# Patient Record
Sex: FEMALE | Race: BLACK OR AFRICAN AMERICAN | NOT HISPANIC OR LATINO
[De-identification: names, ages, dates, MRNs, and addresses within clinical notes are randomized per-mention and may not be internally consistent; named-entity substitution may affect disease eponyms.]

---

## 2019-12-10 ENCOUNTER — TRANSCRIPTION ENCOUNTER (OUTPATIENT)
Age: 84
End: 2019-12-10

## 2019-12-11 ENCOUNTER — INPATIENT (INPATIENT)
Facility: HOSPITAL | Age: 84
LOS: 4 days | Discharge: NURSING, REHAB OR LONG-TERM CARE | End: 2019-12-16
Attending: ORTHOPAEDIC SURGERY | Admitting: ORTHOPAEDIC SURGERY
Payer: MEDICARE

## 2019-12-11 VITALS
TEMPERATURE: 98 F | OXYGEN SATURATION: 100 % | RESPIRATION RATE: 20 BRPM | SYSTOLIC BLOOD PRESSURE: 153 MMHG | DIASTOLIC BLOOD PRESSURE: 48 MMHG | HEART RATE: 58 BPM

## 2019-12-11 DIAGNOSIS — I10 ESSENTIAL (PRIMARY) HYPERTENSION: ICD-10-CM

## 2019-12-11 DIAGNOSIS — S72.145A NONDISPLACED INTERTROCHANTERIC FRACTURE OF LEFT FEMUR, INITIAL ENCOUNTER FOR CLOSED FRACTURE: ICD-10-CM

## 2019-12-11 DIAGNOSIS — Z01.818 ENCOUNTER FOR OTHER PREPROCEDURAL EXAMINATION: ICD-10-CM

## 2019-12-11 LAB
ALBUMIN SERPL ELPH-MCNC: 3.9 G/DL — SIGNIFICANT CHANGE UP (ref 3.3–5)
ALP SERPL-CCNC: 62 U/L — SIGNIFICANT CHANGE UP (ref 40–120)
ALT FLD-CCNC: 5 U/L — SIGNIFICANT CHANGE UP (ref 4–33)
ANION GAP SERPL CALC-SCNC: 14 MMO/L — SIGNIFICANT CHANGE UP (ref 7–14)
APPEARANCE UR: CLEAR — SIGNIFICANT CHANGE UP
APTT BLD: 25.5 SEC — LOW (ref 27.5–36.3)
AST SERPL-CCNC: 24 U/L — SIGNIFICANT CHANGE UP (ref 4–32)
BACTERIA # UR AUTO: SIGNIFICANT CHANGE UP
BASOPHILS # BLD AUTO: 0.01 K/UL — SIGNIFICANT CHANGE UP (ref 0–0.2)
BASOPHILS NFR BLD AUTO: 0.1 % — SIGNIFICANT CHANGE UP (ref 0–2)
BILIRUB SERPL-MCNC: 0.3 MG/DL — SIGNIFICANT CHANGE UP (ref 0.2–1.2)
BILIRUB UR-MCNC: NEGATIVE — SIGNIFICANT CHANGE UP
BLD GP AB SCN SERPL QL: NEGATIVE — SIGNIFICANT CHANGE UP
BLOOD UR QL VISUAL: SIGNIFICANT CHANGE UP
BUN SERPL-MCNC: 24 MG/DL — HIGH (ref 7–23)
CALCIUM SERPL-MCNC: 9.8 MG/DL — SIGNIFICANT CHANGE UP (ref 8.4–10.5)
CHLORIDE SERPL-SCNC: 104 MMOL/L — SIGNIFICANT CHANGE UP (ref 98–107)
CO2 SERPL-SCNC: 18 MMOL/L — LOW (ref 22–31)
COLOR SPEC: SIGNIFICANT CHANGE UP
CREAT SERPL-MCNC: 0.87 MG/DL — SIGNIFICANT CHANGE UP (ref 0.5–1.3)
EOSINOPHIL # BLD AUTO: 0.07 K/UL — SIGNIFICANT CHANGE UP (ref 0–0.5)
EOSINOPHIL NFR BLD AUTO: 0.8 % — SIGNIFICANT CHANGE UP (ref 0–6)
GLUCOSE SERPL-MCNC: 109 MG/DL — HIGH (ref 70–99)
GLUCOSE UR-MCNC: NEGATIVE — SIGNIFICANT CHANGE UP
HCT VFR BLD CALC: 39.2 % — SIGNIFICANT CHANGE UP (ref 34.5–45)
HGB BLD-MCNC: 12.6 G/DL — SIGNIFICANT CHANGE UP (ref 11.5–15.5)
HYALINE CASTS # UR AUTO: NEGATIVE — SIGNIFICANT CHANGE UP
IMM GRANULOCYTES NFR BLD AUTO: 0.5 % — SIGNIFICANT CHANGE UP (ref 0–1.5)
INR BLD: 1.09 — SIGNIFICANT CHANGE UP (ref 0.88–1.17)
KETONES UR-MCNC: SIGNIFICANT CHANGE UP
LEUKOCYTE ESTERASE UR-ACNC: SIGNIFICANT CHANGE UP
LYMPHOCYTES # BLD AUTO: 0.64 K/UL — LOW (ref 1–3.3)
LYMPHOCYTES # BLD AUTO: 7.3 % — LOW (ref 13–44)
MCHC RBC-ENTMCNC: 29.8 PG — SIGNIFICANT CHANGE UP (ref 27–34)
MCHC RBC-ENTMCNC: 32.1 % — SIGNIFICANT CHANGE UP (ref 32–36)
MCV RBC AUTO: 92.7 FL — SIGNIFICANT CHANGE UP (ref 80–100)
MONOCYTES # BLD AUTO: 0.31 K/UL — SIGNIFICANT CHANGE UP (ref 0–0.9)
MONOCYTES NFR BLD AUTO: 3.5 % — SIGNIFICANT CHANGE UP (ref 2–14)
NEUTROPHILS # BLD AUTO: 7.74 K/UL — HIGH (ref 1.8–7.4)
NEUTROPHILS NFR BLD AUTO: 87.8 % — HIGH (ref 43–77)
NITRITE UR-MCNC: NEGATIVE — SIGNIFICANT CHANGE UP
NRBC # FLD: 0 K/UL — SIGNIFICANT CHANGE UP (ref 0–0)
PH UR: 6 — SIGNIFICANT CHANGE UP (ref 5–8)
PLATELET # BLD AUTO: 342 K/UL — SIGNIFICANT CHANGE UP (ref 150–400)
PMV BLD: 10.1 FL — SIGNIFICANT CHANGE UP (ref 7–13)
POTASSIUM SERPL-MCNC: 4.6 MMOL/L — SIGNIFICANT CHANGE UP (ref 3.5–5.3)
POTASSIUM SERPL-SCNC: 4.6 MMOL/L — SIGNIFICANT CHANGE UP (ref 3.5–5.3)
PROT SERPL-MCNC: 8.5 G/DL — HIGH (ref 6–8.3)
PROT UR-MCNC: 20 — SIGNIFICANT CHANGE UP
PROTHROM AB SERPL-ACNC: 12.1 SEC — SIGNIFICANT CHANGE UP (ref 9.8–13.1)
RBC # BLD: 4.23 M/UL — SIGNIFICANT CHANGE UP (ref 3.8–5.2)
RBC # FLD: 13 % — SIGNIFICANT CHANGE UP (ref 10.3–14.5)
RBC CASTS # UR COMP ASSIST: HIGH (ref 0–?)
RH IG SCN BLD-IMP: POSITIVE — SIGNIFICANT CHANGE UP
SODIUM SERPL-SCNC: 136 MMOL/L — SIGNIFICANT CHANGE UP (ref 135–145)
SP GR SPEC: 1.02 — SIGNIFICANT CHANGE UP (ref 1–1.04)
SQUAMOUS # UR AUTO: SIGNIFICANT CHANGE UP
UROBILINOGEN FLD QL: NORMAL — SIGNIFICANT CHANGE UP
WBC # BLD: 8.81 K/UL — SIGNIFICANT CHANGE UP (ref 3.8–10.5)
WBC # FLD AUTO: 8.81 K/UL — SIGNIFICANT CHANGE UP (ref 3.8–10.5)
WBC UR QL: SIGNIFICANT CHANGE UP (ref 0–?)

## 2019-12-11 PROCEDURE — 99223 1ST HOSP IP/OBS HIGH 75: CPT

## 2019-12-11 PROCEDURE — 73552 X-RAY EXAM OF FEMUR 2/>: CPT | Mod: 26,LT

## 2019-12-11 PROCEDURE — 73502 X-RAY EXAM HIP UNI 2-3 VIEWS: CPT | Mod: 26,LT

## 2019-12-11 PROCEDURE — 71045 X-RAY EXAM CHEST 1 VIEW: CPT | Mod: 26

## 2019-12-11 PROCEDURE — 27245 TREAT THIGH FRACTURE: CPT | Mod: LT

## 2019-12-11 RX ORDER — HEPARIN SODIUM 5000 [USP'U]/ML
5000 INJECTION INTRAVENOUS; SUBCUTANEOUS ONCE
Refills: 0 | Status: DISCONTINUED | OUTPATIENT
Start: 2019-12-11 | End: 2019-12-11

## 2019-12-11 RX ORDER — OXYCODONE HYDROCHLORIDE 5 MG/1
2.5 TABLET ORAL EVERY 4 HOURS
Refills: 0 | Status: DISCONTINUED | OUTPATIENT
Start: 2019-12-11 | End: 2019-12-12

## 2019-12-11 RX ORDER — MAGNESIUM HYDROXIDE 400 MG/1
30 TABLET, CHEWABLE ORAL DAILY
Refills: 0 | Status: DISCONTINUED | OUTPATIENT
Start: 2019-12-11 | End: 2019-12-16

## 2019-12-11 RX ORDER — AMLODIPINE BESYLATE 2.5 MG/1
10 TABLET ORAL DAILY
Refills: 0 | Status: DISCONTINUED | OUTPATIENT
Start: 2019-12-11 | End: 2019-12-13

## 2019-12-11 RX ORDER — LANOLIN ALCOHOL/MO/W.PET/CERES
3 CREAM (GRAM) TOPICAL AT BEDTIME
Refills: 0 | Status: DISCONTINUED | OUTPATIENT
Start: 2019-12-11 | End: 2019-12-16

## 2019-12-11 RX ORDER — SENNA PLUS 8.6 MG/1
2 TABLET ORAL AT BEDTIME
Refills: 0 | Status: DISCONTINUED | OUTPATIENT
Start: 2019-12-11 | End: 2019-12-13

## 2019-12-11 RX ORDER — INFLUENZA VIRUS VACCINE 15; 15; 15; 15 UG/.5ML; UG/.5ML; UG/.5ML; UG/.5ML
0.5 SUSPENSION INTRAMUSCULAR ONCE
Refills: 0 | Status: DISCONTINUED | OUTPATIENT
Start: 2019-12-11 | End: 2019-12-16

## 2019-12-11 RX ORDER — BRIMONIDINE TARTRATE, TIMOLOL MALEATE 2; 5 MG/ML; MG/ML
1 SOLUTION/ DROPS OPHTHALMIC
Refills: 0 | Status: DISCONTINUED | OUTPATIENT
Start: 2019-12-11 | End: 2019-12-16

## 2019-12-11 RX ORDER — PANTOPRAZOLE SODIUM 20 MG/1
40 TABLET, DELAYED RELEASE ORAL
Refills: 0 | Status: DISCONTINUED | OUTPATIENT
Start: 2019-12-11 | End: 2019-12-16

## 2019-12-11 RX ORDER — CHLORHEXIDINE GLUCONATE 213 G/1000ML
1 SOLUTION TOPICAL EVERY 12 HOURS
Refills: 0 | Status: DISCONTINUED | OUTPATIENT
Start: 2019-12-11 | End: 2019-12-11

## 2019-12-11 RX ORDER — LISINOPRIL 2.5 MG/1
40 TABLET ORAL DAILY
Refills: 0 | Status: DISCONTINUED | OUTPATIENT
Start: 2019-12-11 | End: 2019-12-13

## 2019-12-11 RX ORDER — SODIUM CHLORIDE 9 MG/ML
1000 INJECTION INTRAMUSCULAR; INTRAVENOUS; SUBCUTANEOUS
Refills: 0 | Status: DISCONTINUED | OUTPATIENT
Start: 2019-12-11 | End: 2019-12-12

## 2019-12-11 RX ORDER — INFLUENZA VIRUS VACCINE 15; 15; 15; 15 UG/.5ML; UG/.5ML; UG/.5ML; UG/.5ML
0.5 SUSPENSION INTRAMUSCULAR ONCE
Refills: 0 | Status: DISCONTINUED | OUTPATIENT
Start: 2019-12-11 | End: 2019-12-12

## 2019-12-11 RX ORDER — ACETAMINOPHEN 500 MG
975 TABLET ORAL ONCE
Refills: 0 | Status: COMPLETED | OUTPATIENT
Start: 2019-12-11 | End: 2019-12-11

## 2019-12-11 RX ORDER — HEPARIN SODIUM 5000 [USP'U]/ML
5000 INJECTION INTRAVENOUS; SUBCUTANEOUS ONCE
Refills: 0 | Status: COMPLETED | OUTPATIENT
Start: 2019-12-11 | End: 2019-12-11

## 2019-12-11 RX ORDER — POVIDONE-IODINE 5 %
1 AEROSOL (ML) TOPICAL ONCE
Refills: 0 | Status: COMPLETED | OUTPATIENT
Start: 2019-12-11 | End: 2019-12-12

## 2019-12-11 RX ORDER — CHLORHEXIDINE GLUCONATE 213 G/1000ML
1 SOLUTION TOPICAL ONCE
Refills: 0 | Status: COMPLETED | OUTPATIENT
Start: 2019-12-11 | End: 2019-12-12

## 2019-12-11 RX ORDER — OXYCODONE HYDROCHLORIDE 5 MG/1
5 TABLET ORAL EVERY 4 HOURS
Refills: 0 | Status: DISCONTINUED | OUTPATIENT
Start: 2019-12-11 | End: 2019-12-12

## 2019-12-11 RX ORDER — ACETAMINOPHEN 500 MG
1000 TABLET ORAL EVERY 8 HOURS
Refills: 0 | Status: DISCONTINUED | OUTPATIENT
Start: 2019-12-11 | End: 2019-12-13

## 2019-12-11 RX ADMIN — HEPARIN SODIUM 5000 UNIT(S): 5000 INJECTION INTRAVENOUS; SUBCUTANEOUS at 18:17

## 2019-12-11 NOTE — H&P ADULT - HISTORY OF PRESENT ILLNESS
Patient is a 89 year old female s/p trip and fall over a chair which got caught on a rug. Patient states she fell onto her left hip. Denies LOC, head trauma, pain or injury elsewhere. Denies Syncopy prior to fall. No CP,SOB, Dizziness, N, V, D, HA. Patient states she was unable to stand and weight bear after fall. At baseline ambulated with a cane without difficulty. Patient present to ED with family at bedside for increased pain. Past medical history significant for HTN, Glaucoma s/p surgery, Osteoporosis.

## 2019-12-11 NOTE — CONSULT NOTE ADULT - ASSESSMENT
Shell Powell is a very pleasant 89 year old lady with history of osteoporosis and HTN (on amlodipine and lisinopril) p/w with left intertrochanteric fracture as a result of a mechanical fall.

## 2019-12-11 NOTE — ED PROVIDER NOTE - ATTENDING CONTRIBUTION TO CARE
Pt presents after mechanical fall with L hip fx, pending ortho eval. No  head trauma or LOC, not on AC. Pt  otherwise well appearing.

## 2019-12-11 NOTE — ED PROVIDER NOTE - LOWER EXTREMITY EXAM, LEFT
Left hip TTP anteriorly with mild shortening, external rotation. Strong distal pulses, normal sensation

## 2019-12-11 NOTE — ED ADULT NURSE NOTE - NSIMPLEMENTINTERV_GEN_ALL_ED
Implemented All Fall Risk Interventions:  [place] to call system. Call bell, personal items and telephone within reach. Instruct patient to call for assistance. Room bathroom lighting operational. Non-slip footwear when patient is off stretcher. Physically safe environment: no spills, clutter or unnecessary equipment. Stretcher in lowest position, wheels locked, appropriate side rails in place. Provide visual cue, wrist band, yellow gown, etc. Monitor gait and stability. Monitor for mental status changes and reorient to person, place, and time. Review medications for side effects contributing to fall risk. Reinforce activity limits and safety measures with patient and family.

## 2019-12-11 NOTE — CONSULT NOTE ADULT - SUBJECTIVE AND OBJECTIVE BOX
CHIEF COMPLAINT: Patient is a 89y old  Female who presents with a chief complaint of Left Hip Intertrochanteric Fracture (11 Dec 2019 14:37)      HPI: Patient is a 89 year old female s/p trip and fall over a chair which got caught on a rug. Patient states she fell onto her left hip. Denies LOC, head trauma, pain or injury elsewhere. Denies Syncopy prior to fall. No CP,SOB, Dizziness, N, V, D, HA. Patient states she was unable to stand and weight bear after fall. At baseline ambulated with a cane without difficulty. Patient present to ED with family at bedside for increased pain. Past medical history significant for HTN, Glaucoma s/p surgery, Osteoporosis. (11 Dec 2019 14:37)      Shell Powell is a very pleasant 89 year old lady with history of osteoporosis and HTN (on amlodipine and lisinopril) p/w with left hip fracture as a result of a mechanical fall. Patient states that fell over a chair which caught in the rug. Medicine consulted for preop clearance. The patient denies head trauma related to the fall. As noted above she ambulates with a cane, denies any exertional dyspnea, orthopnea, chest pain or palpitations. She also denies smoking or drinking.       History limited due to: [ ] Dementia  [ ] Delirium  [ ] Condition    Pain Symptoms if applicable:             	                         none	   mild         moderate         severe  	                            0	    1-3	     4-6	         7-10  Pain:  Location:	  Modifying factors:	  Associated symptoms:	    Allergies    No Known Allergies    Intolerances        HOME MEDICATIONS: [x] Reviewed    MEDICATIONS  (STANDING):  amLODIPine   Tablet 10 milliGRAM(s) Oral daily  brimonidine 0.2%/ timolol 0.5% Ophthalmic Solution 1 Drop(s) Both EYES two times a day  chlorhexidine 2% Cloths 1 Application(s) Topical every 12 hours  heparin  Injectable 5000 Unit(s) SubCutaneous once  lisinopril 40 milliGRAM(s) Oral daily  pantoprazole    Tablet 40 milliGRAM(s) Oral before breakfast  povidone iodine 5% Nasal Swab 1 Application(s) Both Nostrils once  senna 2 Tablet(s) Oral at bedtime  sodium chloride 0.9%. 1000 milliLiter(s) (125 mL/Hr) IV Continuous <Continuous>    MEDICATIONS  (PRN):  acetaminophen  IVPB .. 1000 milliGRAM(s) IV Intermittent every 8 hours PRN Mild Pain (1 - 3)  magnesium hydroxide Suspension 30 milliLiter(s) Oral daily PRN Constipation  melatonin 3 milliGRAM(s) Oral at bedtime PRN Insomnia  oxyCODONE    IR 2.5 milliGRAM(s) Oral every 4 hours PRN Moderate Pain (4 - 6)  oxyCODONE    IR 5 milliGRAM(s) Oral every 4 hours PRN Severe Pain (7 - 10)      PAST MEDICAL & SURGICAL HISTORY:  HTN (hypertension)  No significant past surgical history  [x ] Reviewed     SOCIAL HISTORY: Patient denies smoking, drinking or drug use  [x] Substance abuse:   [x] Alcohol use:   [x] Tobacco:     FAMILY HISTORY:  [x] No pertinent family history in first degree relatives   No history of post op reactions in family    REVIEW OF SYSTEMS:  [x] All other ROS negative  [  ] Unable to obtain due to poor mental status    Vital Signs Last 24 Hrs  T(C): 36.7 (11 Dec 2019 15:51), Max: 36.8 (11 Dec 2019 11:15)  T(F): 98.1 (11 Dec 2019 15:51), Max: 98.3 (11 Dec 2019 11:15)  HR: 65 (11 Dec 2019 15:51) (58 - 65)  BP: 146/59 (11 Dec 2019 15:51) (146/59 - 166/67)  BP(mean): --  RR: 16 (11 Dec 2019 15:51) (16 - 20)  SpO2: 100% (11 Dec 2019 15:51) (100% - 100%)    PHYSICAL EXAM:  GENERAL: NAD, well-groomed, well-developed, pleasant  HEAD:  Atraumatic, Normocephalic  EYES: EOMI, PERRLA, conjunctiva and sclera clear  ENMT: Moist mucous membranes  NECK: Supple, No JVD  RESPIRATORY: Clear to auscultation bilaterally; No rales, rhonchi, wheezing, or rubs  CARDIOVASCULAR: Regular rate and rhythm; No murmurs, rubs, or gallops  GASTROINTESTINAL: Soft, Nontender, Nondistended; Bowel sounds present  GENITOURINARY: Not examined  EXTREMITIES:  2+ Peripheral Pulses, No clubbing, cyanosis, or edema.   NERVOUS SYSTEM:  Alert & Oriented X3; Moving all 4 extremities; No gross sensory deficits  HEME/LYMPH: No lymphadenopathy noted  SKIN: No rashes or lesions; Incisions C/D/I    LABS:                        12.6   8.81  )-----------( 342      ( 11 Dec 2019 12:22 )             39.2     Hemoglobin: 12.6 g/dL (12-11 @ 12:22)    12-11    136  |  104  |  24<H>  ----------------------------<  109<H>  4.6   |  18<L>  |  0.87    Ca    9.8      11 Dec 2019 12:22    TPro  8.5<H>  /  Alb  3.9  /  TBili  0.3  /  DBili  x   /  AST  24  /  ALT  5   /  AlkPhos  62  12-11    PT/INR - ( 11 Dec 2019 12:22 )   PT: 12.1 SEC;   INR: 1.09          PTT - ( 11 Dec 2019 12:22 )  PTT:25.5 SEC    Microbiology     RADIOLOGY & ADDITIONAL STUDIES:    EKG:   Personally Reviewed:  [x] YES     Imaging:   Personally Reviewed:  [x] YES               [ ] Consultant(s) Notes Reviewed  [x] Care Discussed with Consultants/Other Providers: Ortho PA - discussed

## 2019-12-11 NOTE — ED ADULT NURSE NOTE - OBJECTIVE STATEMENT
Patient received to [facility]&Ox4, ambulatory with a cane at baseline, brought in by EMS after fall this morning. Patient reports that she tripped while moving furniture. Denies hitting head/loc, no blood thinner use. Reporting L hip pain worse when trying to move the leg. Pt. unable to move L leg. Leg appears externally rotated. Pulses present in all extremities. No bruising/swelling noted to L hip. VS as noted. MD at bedside. Awaiting further orders, will continue to monitor.

## 2019-12-11 NOTE — ED PROVIDER NOTE - CLINICAL SUMMARY MEDICAL DECISION MAKING FREE TEXT BOX
Mechanical fall with left intertrochanteric fracture, no other injuries identified on exam, pain is well controlled. Screenings and ECG performed for preoperative evaluation. Orthopedics to consult for admission and treatment.

## 2019-12-11 NOTE — H&P ADULT - PROBLEM SELECTOR PLAN 1
cbc, bmp, PT, PTT, INR, type and screen X 2, UA  EKG, CXR,   NPO after midnight   Hold anticoagulation after midnight  FU Medical clearance for OR  Plan discussed with Dr Durán, attending agrees with plan.

## 2019-12-11 NOTE — H&P ADULT - NSHPPHYSICALEXAM_GEN_ALL_CORE
Gen: Alert and Oriented X 3  BL LE: motor intact in the bilateral lower extremity. Sensation is grossly intact in the distal extremity. Pulses 1+, extremity is warm to touch. Moving toes.

## 2019-12-11 NOTE — CONSULT NOTE ADULT - PROBLEM SELECTOR RECOMMENDATION 2
Vitals stable  EKG:  Despite the patient's age she has a very low revised cardiac risk index for pre-operative risk Vitals stable  EKG: Normal sinus, mostly unremarkable  Despite the patient's age she has a very low revised cardiac risk index for pre-operative risk  Patient able to climb more than 20 steps to her house, denies any shortness of breath or chest pain as a result.   States that she had a full cardiac workup earlier this year which was unremarkable.    At this time there is no medical contraindication to proceed with surgery.

## 2019-12-11 NOTE — ED PROVIDER NOTE - OBJECTIVE STATEMENT
89yof w/ HTN, osteoporosis, was moving a chair at home, tripped on the carpet and fell backwards onto her left side. She denies head strike or LOC. Complains of pain to the left hip and was unable to get up or bear weight after the fall. Denies any other injuries.

## 2019-12-12 LAB
24R-OH-CALCIDIOL SERPL-MCNC: 18.6 NG/ML — LOW (ref 30–80)
ALBUMIN SERPL ELPH-MCNC: 3 G/DL — LOW (ref 3.3–5)
ANION GAP SERPL CALC-SCNC: 10 MMO/L — SIGNIFICANT CHANGE UP (ref 7–14)
ANION GAP SERPL CALC-SCNC: 11 MMO/L — SIGNIFICANT CHANGE UP (ref 7–14)
APTT BLD: 27.2 SEC — LOW (ref 27.5–36.3)
BASOPHILS # BLD AUTO: 0.02 K/UL — SIGNIFICANT CHANGE UP (ref 0–0.2)
BASOPHILS NFR BLD AUTO: 0.5 % — SIGNIFICANT CHANGE UP (ref 0–2)
BLD GP AB SCN SERPL QL: NEGATIVE — SIGNIFICANT CHANGE UP
BUN SERPL-MCNC: 12 MG/DL — SIGNIFICANT CHANGE UP (ref 7–23)
BUN SERPL-MCNC: 17 MG/DL — SIGNIFICANT CHANGE UP (ref 7–23)
CALCIUM SERPL-MCNC: 8.5 MG/DL — SIGNIFICANT CHANGE UP (ref 8.4–10.5)
CALCIUM SERPL-MCNC: 8.8 MG/DL — SIGNIFICANT CHANGE UP (ref 8.4–10.5)
CHLORIDE SERPL-SCNC: 110 MMOL/L — HIGH (ref 98–107)
CHLORIDE SERPL-SCNC: 111 MMOL/L — HIGH (ref 98–107)
CO2 SERPL-SCNC: 18 MMOL/L — LOW (ref 22–31)
CO2 SERPL-SCNC: 19 MMOL/L — LOW (ref 22–31)
CREAT SERPL-MCNC: 0.67 MG/DL — SIGNIFICANT CHANGE UP (ref 0.5–1.3)
CREAT SERPL-MCNC: 0.68 MG/DL — SIGNIFICANT CHANGE UP (ref 0.5–1.3)
EOSINOPHIL # BLD AUTO: 0.04 K/UL — SIGNIFICANT CHANGE UP (ref 0–0.5)
EOSINOPHIL NFR BLD AUTO: 0.9 % — SIGNIFICANT CHANGE UP (ref 0–6)
GLUCOSE SERPL-MCNC: 128 MG/DL — HIGH (ref 70–99)
GLUCOSE SERPL-MCNC: 91 MG/DL — SIGNIFICANT CHANGE UP (ref 70–99)
HCT VFR BLD CALC: 28.6 % — LOW (ref 34.5–45)
HCT VFR BLD CALC: 30.8 % — LOW (ref 34.5–45)
HCT VFR BLD CALC: 30.9 % — LOW (ref 34.5–45)
HGB BLD-MCNC: 10 G/DL — LOW (ref 11.5–15.5)
HGB BLD-MCNC: 9.1 G/DL — LOW (ref 11.5–15.5)
HGB BLD-MCNC: 9.7 G/DL — LOW (ref 11.5–15.5)
IMM GRANULOCYTES NFR BLD AUTO: 0.5 % — SIGNIFICANT CHANGE UP (ref 0–1.5)
INR BLD: 1.2 — HIGH (ref 0.88–1.17)
LYMPHOCYTES # BLD AUTO: 0.92 K/UL — LOW (ref 1–3.3)
LYMPHOCYTES # BLD AUTO: 21.6 % — SIGNIFICANT CHANGE UP (ref 13–44)
MCHC RBC-ENTMCNC: 30 PG — SIGNIFICANT CHANGE UP (ref 27–34)
MCHC RBC-ENTMCNC: 30.4 PG — SIGNIFICANT CHANGE UP (ref 27–34)
MCHC RBC-ENTMCNC: 30.7 PG — SIGNIFICANT CHANGE UP (ref 27–34)
MCHC RBC-ENTMCNC: 31.4 % — LOW (ref 32–36)
MCHC RBC-ENTMCNC: 31.8 % — LOW (ref 32–36)
MCHC RBC-ENTMCNC: 32.5 % — SIGNIFICANT CHANGE UP (ref 32–36)
MCV RBC AUTO: 94.4 FL — SIGNIFICANT CHANGE UP (ref 80–100)
MCV RBC AUTO: 94.5 FL — SIGNIFICANT CHANGE UP (ref 80–100)
MCV RBC AUTO: 96.9 FL — SIGNIFICANT CHANGE UP (ref 80–100)
MONOCYTES # BLD AUTO: 0.56 K/UL — SIGNIFICANT CHANGE UP (ref 0–0.9)
MONOCYTES NFR BLD AUTO: 13.2 % — SIGNIFICANT CHANGE UP (ref 2–14)
NEUTROPHILS # BLD AUTO: 2.69 K/UL — SIGNIFICANT CHANGE UP (ref 1.8–7.4)
NEUTROPHILS NFR BLD AUTO: 63.3 % — SIGNIFICANT CHANGE UP (ref 43–77)
NRBC # FLD: 0 K/UL — SIGNIFICANT CHANGE UP (ref 0–0)
PLATELET # BLD AUTO: 243 K/UL — SIGNIFICANT CHANGE UP (ref 150–400)
PLATELET # BLD AUTO: 253 K/UL — SIGNIFICANT CHANGE UP (ref 150–400)
PLATELET # BLD AUTO: 257 K/UL — SIGNIFICANT CHANGE UP (ref 150–400)
PMV BLD: 9.7 FL — SIGNIFICANT CHANGE UP (ref 7–13)
PMV BLD: 9.7 FL — SIGNIFICANT CHANGE UP (ref 7–13)
PMV BLD: 9.8 FL — SIGNIFICANT CHANGE UP (ref 7–13)
POTASSIUM SERPL-MCNC: 3.3 MMOL/L — LOW (ref 3.5–5.3)
POTASSIUM SERPL-MCNC: 3.6 MMOL/L — SIGNIFICANT CHANGE UP (ref 3.5–5.3)
POTASSIUM SERPL-SCNC: 3.3 MMOL/L — LOW (ref 3.5–5.3)
POTASSIUM SERPL-SCNC: 3.6 MMOL/L — SIGNIFICANT CHANGE UP (ref 3.5–5.3)
PROTHROM AB SERPL-ACNC: 13.4 SEC — HIGH (ref 9.8–13.1)
RBC # BLD: 3.03 M/UL — LOW (ref 3.8–5.2)
RBC # BLD: 3.19 M/UL — LOW (ref 3.8–5.2)
RBC # BLD: 3.26 M/UL — LOW (ref 3.8–5.2)
RBC # FLD: 12.8 % — SIGNIFICANT CHANGE UP (ref 10.3–14.5)
RBC # FLD: 12.8 % — SIGNIFICANT CHANGE UP (ref 10.3–14.5)
RBC # FLD: 13 % — SIGNIFICANT CHANGE UP (ref 10.3–14.5)
RH IG SCN BLD-IMP: POSITIVE — SIGNIFICANT CHANGE UP
SODIUM SERPL-SCNC: 139 MMOL/L — SIGNIFICANT CHANGE UP (ref 135–145)
SODIUM SERPL-SCNC: 140 MMOL/L — SIGNIFICANT CHANGE UP (ref 135–145)
WBC # BLD: 3.52 K/UL — LOW (ref 3.8–10.5)
WBC # BLD: 4.25 K/UL — SIGNIFICANT CHANGE UP (ref 3.8–10.5)
WBC # BLD: 8.83 K/UL — SIGNIFICANT CHANGE UP (ref 3.8–10.5)
WBC # FLD AUTO: 3.52 K/UL — LOW (ref 3.8–10.5)
WBC # FLD AUTO: 4.25 K/UL — SIGNIFICANT CHANGE UP (ref 3.8–10.5)
WBC # FLD AUTO: 8.83 K/UL — SIGNIFICANT CHANGE UP (ref 3.8–10.5)

## 2019-12-12 RX ORDER — SODIUM CHLORIDE 9 MG/ML
1000 INJECTION INTRAMUSCULAR; INTRAVENOUS; SUBCUTANEOUS
Refills: 0 | Status: DISCONTINUED | OUTPATIENT
Start: 2019-12-12 | End: 2019-12-16

## 2019-12-12 RX ORDER — CEFAZOLIN SODIUM 1 G
2000 VIAL (EA) INJECTION EVERY 8 HOURS
Refills: 0 | Status: COMPLETED | OUTPATIENT
Start: 2019-12-12 | End: 2019-12-13

## 2019-12-12 RX ORDER — ASCORBIC ACID 60 MG
500 TABLET,CHEWABLE ORAL DAILY
Refills: 0 | Status: DISCONTINUED | OUTPATIENT
Start: 2019-12-13 | End: 2019-12-16

## 2019-12-12 RX ORDER — ACETAMINOPHEN 500 MG
975 TABLET ORAL EVERY 8 HOURS
Refills: 0 | Status: DISCONTINUED | OUTPATIENT
Start: 2019-12-12 | End: 2019-12-16

## 2019-12-12 RX ORDER — DEXTROSE MONOHYDRATE, SODIUM CHLORIDE, AND POTASSIUM CHLORIDE 50; .745; 4.5 G/1000ML; G/1000ML; G/1000ML
1000 INJECTION, SOLUTION INTRAVENOUS
Refills: 0 | Status: DISCONTINUED | OUTPATIENT
Start: 2019-12-12 | End: 2019-12-13

## 2019-12-12 RX ORDER — LABETALOL HCL 100 MG
5 TABLET ORAL ONCE
Refills: 0 | Status: COMPLETED | OUTPATIENT
Start: 2019-12-12 | End: 2019-12-12

## 2019-12-12 RX ORDER — ASPIRIN/CALCIUM CARB/MAGNESIUM 324 MG
325 TABLET ORAL
Refills: 0 | Status: DISCONTINUED | OUTPATIENT
Start: 2019-12-13 | End: 2019-12-13

## 2019-12-12 RX ORDER — OXYCODONE HYDROCHLORIDE 5 MG/1
2.5 TABLET ORAL EVERY 4 HOURS
Refills: 0 | Status: DISCONTINUED | OUTPATIENT
Start: 2019-12-12 | End: 2019-12-13

## 2019-12-12 RX ORDER — POLYETHYLENE GLYCOL 3350 17 G/17G
17 POWDER, FOR SOLUTION ORAL DAILY
Refills: 0 | Status: DISCONTINUED | OUTPATIENT
Start: 2019-12-12 | End: 2019-12-13

## 2019-12-12 RX ORDER — OXYCODONE HYDROCHLORIDE 5 MG/1
5 TABLET ORAL EVERY 4 HOURS
Refills: 0 | Status: DISCONTINUED | OUTPATIENT
Start: 2019-12-12 | End: 2019-12-16

## 2019-12-12 RX ADMIN — Medication 1 APPLICATION(S): at 05:22

## 2019-12-12 RX ADMIN — Medication 5 MILLIGRAM(S): at 15:41

## 2019-12-12 RX ADMIN — DEXTROSE MONOHYDRATE, SODIUM CHLORIDE, AND POTASSIUM CHLORIDE 125 MILLILITER(S): 50; .745; 4.5 INJECTION, SOLUTION INTRAVENOUS at 07:20

## 2019-12-12 RX ADMIN — LISINOPRIL 40 MILLIGRAM(S): 2.5 TABLET ORAL at 05:21

## 2019-12-12 RX ADMIN — AMLODIPINE BESYLATE 10 MILLIGRAM(S): 2.5 TABLET ORAL at 05:22

## 2019-12-12 RX ADMIN — SENNA PLUS 2 TABLET(S): 8.6 TABLET ORAL at 21:00

## 2019-12-12 RX ADMIN — PANTOPRAZOLE SODIUM 40 MILLIGRAM(S): 20 TABLET, DELAYED RELEASE ORAL at 05:22

## 2019-12-12 RX ADMIN — Medication 975 MILLIGRAM(S): at 21:00

## 2019-12-12 RX ADMIN — SODIUM CHLORIDE 75 MILLILITER(S): 9 INJECTION INTRAMUSCULAR; INTRAVENOUS; SUBCUTANEOUS at 15:45

## 2019-12-12 RX ADMIN — Medication 100 MILLIGRAM(S): at 21:00

## 2019-12-12 RX ADMIN — CHLORHEXIDINE GLUCONATE 1 APPLICATION(S): 213 SOLUTION TOPICAL at 05:22

## 2019-12-12 NOTE — PROGRESS NOTE ADULT - SUBJECTIVE AND OBJECTIVE BOX
ORTHO PREOP NOTE     Diagnosis: left IT hip fracture     Surgeon: Leeanna QUESADA    Procedure: IM Nail left hip      Labs:                          12.6   8.81  )-----------( 342      ( 11 Dec 2019 12:22 )             39.2             139  |  110<H>  |  17  ----------------------------<  91  3.3<L>   |  18<L>  |  0.67    Ca    8.5      12 Dec 2019 04:30    TPro  x   /  Alb  3.0<L>  /  TBili  x   /  DBili  x   /  AST  x   /  ALT  x   /  AlkPhos  x           PT/INR - ( 12 Dec 2019 04:30 )   PT: 13.4 SEC;   INR: 1.20          PTT - ( 12 Dec 2019 04:30 )  PTT:27.2 SEC      Urinalysis Basic - ( 11 Dec 2019 17:25 )    Color: LIGHT YELLOW / Appearance: CLEAR / S.017 / pH: 6.0  Gluc: NEGATIVE / Ketone: SMALL  / Bili: NEGATIVE / Urobili: NORMAL   Blood: SMALL / Protein: 20 / Nitrite: NEGATIVE   Leuk Esterase: TRACE / RBC: 11-25 / WBC 3-5   Sq Epi: FEW / Non Sq Epi: x / Bacteria: FEW          CXR: done    Type and cross x 2 done     Medical  Clearance: in chart     Consent: in chart     NPO: yes

## 2019-12-12 NOTE — PROGRESS NOTE ADULT - SUBJECTIVE AND OBJECTIVE BOX
ORTHO POC      Patient resting comfortably without complaint s/p left hip IM Nail  today     Vital Signs Last 24 Hrs  T(C): 36.6 (12 Dec 2019 15:30), Max: 37.2 (11 Dec 2019 18:39)  T(F): 97.9 (12 Dec 2019 15:30), Max: 99 (11 Dec 2019 18:39)  HR: 68 (12 Dec 2019 16:15) (65 - 88)  BP: 137/88 (12 Dec 2019 16:15) (123/48 - 174/83)  BP(mean): 81 (12 Dec 2019 16:15) (75 - 107)  RR: 12 (12 Dec 2019 16:15) (12 - 18)  SpO2: 100% (12 Dec 2019 16:15) (97% - 100%)    left hip : dressing clean/dry/ intact              LE:  EHL/GC/TA 5/5                  sensory intact                   DP pulse 2+    Labs :                      10.0   8.83  )-----------( 243      ( 12 Dec 2019 16:10 )             30.8                 12-12    139  |  110<H>  |  17  ----------------------------<  91  3.3<L>   |  18<L>  |  0.67    Ca    8.5      12 Dec 2019 04:30    TPro  x   /  Alb  3.0<L>  /  TBili  x   /  DBili  x   /  AST  x   /  ALT  x   /  AlkPhos  x   12-12      U/O:  DTV    A/P: Stable postop            PT- WBAT            DVT prophylaxis- venodynes/  BID           Pain Control            Incentive Spirometer            Antibiotics x 24 hr            Follow up AM labs

## 2019-12-13 ENCOUNTER — TRANSCRIPTION ENCOUNTER (OUTPATIENT)
Age: 84
End: 2019-12-13

## 2019-12-13 LAB
ANION GAP SERPL CALC-SCNC: 8 MMO/L — SIGNIFICANT CHANGE UP (ref 7–14)
BASOPHILS # BLD AUTO: 0.01 K/UL — SIGNIFICANT CHANGE UP (ref 0–0.2)
BASOPHILS NFR BLD AUTO: 0.2 % — SIGNIFICANT CHANGE UP (ref 0–2)
BUN SERPL-MCNC: 11 MG/DL — SIGNIFICANT CHANGE UP (ref 7–23)
CALCIUM SERPL-MCNC: 8.6 MG/DL — SIGNIFICANT CHANGE UP (ref 8.4–10.5)
CHLORIDE SERPL-SCNC: 110 MMOL/L — HIGH (ref 98–107)
CO2 SERPL-SCNC: 19 MMOL/L — LOW (ref 22–31)
CREAT SERPL-MCNC: 0.72 MG/DL — SIGNIFICANT CHANGE UP (ref 0.5–1.3)
EOSINOPHIL # BLD AUTO: 0 K/UL — SIGNIFICANT CHANGE UP (ref 0–0.5)
EOSINOPHIL NFR BLD AUTO: 0 % — SIGNIFICANT CHANGE UP (ref 0–6)
GLUCOSE SERPL-MCNC: 128 MG/DL — HIGH (ref 70–99)
HCT VFR BLD CALC: 26.6 % — LOW (ref 34.5–45)
HGB BLD-MCNC: 8.7 G/DL — LOW (ref 11.5–15.5)
IMM GRANULOCYTES NFR BLD AUTO: 0.2 % — SIGNIFICANT CHANGE UP (ref 0–1.5)
LYMPHOCYTES # BLD AUTO: 0.64 K/UL — LOW (ref 1–3.3)
LYMPHOCYTES # BLD AUTO: 10.7 % — LOW (ref 13–44)
MCHC RBC-ENTMCNC: 30.3 PG — SIGNIFICANT CHANGE UP (ref 27–34)
MCHC RBC-ENTMCNC: 32.7 % — SIGNIFICANT CHANGE UP (ref 32–36)
MCV RBC AUTO: 92.7 FL — SIGNIFICANT CHANGE UP (ref 80–100)
MONOCYTES # BLD AUTO: 0.78 K/UL — SIGNIFICANT CHANGE UP (ref 0–0.9)
MONOCYTES NFR BLD AUTO: 13 % — SIGNIFICANT CHANGE UP (ref 2–14)
NEUTROPHILS # BLD AUTO: 4.56 K/UL — SIGNIFICANT CHANGE UP (ref 1.8–7.4)
NEUTROPHILS NFR BLD AUTO: 75.9 % — SIGNIFICANT CHANGE UP (ref 43–77)
NRBC # FLD: 0 K/UL — SIGNIFICANT CHANGE UP (ref 0–0)
PLATELET # BLD AUTO: 237 K/UL — SIGNIFICANT CHANGE UP (ref 150–400)
PMV BLD: 9.8 FL — SIGNIFICANT CHANGE UP (ref 7–13)
POTASSIUM SERPL-MCNC: 3.3 MMOL/L — LOW (ref 3.5–5.3)
POTASSIUM SERPL-SCNC: 3.3 MMOL/L — LOW (ref 3.5–5.3)
RBC # BLD: 2.87 M/UL — LOW (ref 3.8–5.2)
RBC # FLD: 12.8 % — SIGNIFICANT CHANGE UP (ref 10.3–14.5)
SODIUM SERPL-SCNC: 137 MMOL/L — SIGNIFICANT CHANGE UP (ref 135–145)
WBC # BLD: 6 K/UL — SIGNIFICANT CHANGE UP (ref 3.8–10.5)
WBC # FLD AUTO: 6 K/UL — SIGNIFICANT CHANGE UP (ref 3.8–10.5)

## 2019-12-13 PROCEDURE — 99222 1ST HOSP IP/OBS MODERATE 55: CPT | Mod: GC

## 2019-12-13 PROCEDURE — 99232 SBSQ HOSP IP/OBS MODERATE 35: CPT

## 2019-12-13 RX ORDER — SENNA PLUS 8.6 MG/1
2 TABLET ORAL AT BEDTIME
Refills: 0 | Status: DISCONTINUED | OUTPATIENT
Start: 2019-12-13 | End: 2019-12-16

## 2019-12-13 RX ORDER — LISINOPRIL 2.5 MG/1
40 TABLET ORAL DAILY
Refills: 0 | Status: DISCONTINUED | OUTPATIENT
Start: 2019-12-13 | End: 2019-12-16

## 2019-12-13 RX ORDER — POTASSIUM CHLORIDE 20 MEQ
40 PACKET (EA) ORAL ONCE
Refills: 0 | Status: COMPLETED | OUTPATIENT
Start: 2019-12-13 | End: 2019-12-13

## 2019-12-13 RX ORDER — AMLODIPINE BESYLATE 2.5 MG/1
10 TABLET ORAL DAILY
Refills: 0 | Status: DISCONTINUED | OUTPATIENT
Start: 2019-12-13 | End: 2019-12-16

## 2019-12-13 RX ORDER — POLYETHYLENE GLYCOL 3350 17 G/17G
17 POWDER, FOR SOLUTION ORAL DAILY
Refills: 0 | Status: DISCONTINUED | OUTPATIENT
Start: 2019-12-13 | End: 2019-12-16

## 2019-12-13 RX ORDER — OXYCODONE HYDROCHLORIDE 5 MG/1
2.5 TABLET ORAL EVERY 4 HOURS
Refills: 0 | Status: DISCONTINUED | OUTPATIENT
Start: 2019-12-13 | End: 2019-12-16

## 2019-12-13 RX ORDER — ASPIRIN/CALCIUM CARB/MAGNESIUM 324 MG
81 TABLET ORAL
Refills: 0 | Status: DISCONTINUED | OUTPATIENT
Start: 2019-12-14 | End: 2019-12-16

## 2019-12-13 RX ORDER — ACETAMINOPHEN 500 MG
3 TABLET ORAL
Qty: 0 | Refills: 0 | DISCHARGE
Start: 2019-12-13

## 2019-12-13 RX ORDER — ASPIRIN/CALCIUM CARB/MAGNESIUM 324 MG
1 TABLET ORAL
Qty: 0 | Refills: 0 | DISCHARGE
Start: 2019-12-13

## 2019-12-13 RX ORDER — OXYCODONE HYDROCHLORIDE 5 MG/1
1 TABLET ORAL
Qty: 0 | Refills: 0 | DISCHARGE
Start: 2019-12-13

## 2019-12-13 RX ORDER — SENNA PLUS 8.6 MG/1
2 TABLET ORAL
Qty: 0 | Refills: 0 | DISCHARGE
Start: 2019-12-13

## 2019-12-13 RX ORDER — POLYETHYLENE GLYCOL 3350 17 G/17G
17 POWDER, FOR SOLUTION ORAL
Qty: 0 | Refills: 0 | DISCHARGE
Start: 2019-12-13

## 2019-12-13 RX ORDER — PANTOPRAZOLE SODIUM 20 MG/1
1 TABLET, DELAYED RELEASE ORAL
Qty: 0 | Refills: 0 | DISCHARGE
Start: 2019-12-13

## 2019-12-13 RX ORDER — LANOLIN ALCOHOL/MO/W.PET/CERES
1 CREAM (GRAM) TOPICAL
Qty: 0 | Refills: 0 | DISCHARGE
Start: 2019-12-13

## 2019-12-13 RX ORDER — KETOROLAC TROMETHAMINE 30 MG/ML
15 SYRINGE (ML) INJECTION EVERY 8 HOURS
Refills: 0 | Status: DISCONTINUED | OUTPATIENT
Start: 2019-12-13 | End: 2019-12-13

## 2019-12-13 RX ORDER — ASCORBIC ACID 60 MG
1 TABLET,CHEWABLE ORAL
Qty: 0 | Refills: 0 | DISCHARGE
Start: 2019-12-13

## 2019-12-13 RX ADMIN — PANTOPRAZOLE SODIUM 40 MILLIGRAM(S): 20 TABLET, DELAYED RELEASE ORAL at 05:42

## 2019-12-13 RX ADMIN — Medication 100 MILLIGRAM(S): at 05:43

## 2019-12-13 RX ADMIN — Medication 975 MILLIGRAM(S): at 22:14

## 2019-12-13 RX ADMIN — AMLODIPINE BESYLATE 10 MILLIGRAM(S): 2.5 TABLET ORAL at 05:42

## 2019-12-13 RX ADMIN — Medication 1 TABLET(S): at 13:33

## 2019-12-13 RX ADMIN — LISINOPRIL 40 MILLIGRAM(S): 2.5 TABLET ORAL at 05:42

## 2019-12-13 RX ADMIN — Medication 975 MILLIGRAM(S): at 05:42

## 2019-12-13 RX ADMIN — Medication 325 MILLIGRAM(S): at 05:42

## 2019-12-13 RX ADMIN — Medication 975 MILLIGRAM(S): at 13:33

## 2019-12-13 RX ADMIN — SENNA PLUS 2 TABLET(S): 8.6 TABLET ORAL at 22:13

## 2019-12-13 RX ADMIN — Medication 40 MILLIEQUIVALENT(S): at 10:11

## 2019-12-13 RX ADMIN — Medication 15 MILLIGRAM(S): at 07:39

## 2019-12-13 RX ADMIN — POLYETHYLENE GLYCOL 3350 17 GRAM(S): 17 POWDER, FOR SOLUTION ORAL at 13:33

## 2019-12-13 RX ADMIN — Medication 15 MILLIGRAM(S): at 22:14

## 2019-12-13 RX ADMIN — Medication 500 MILLIGRAM(S): at 13:33

## 2019-12-13 RX ADMIN — Medication 15 MILLIGRAM(S): at 16:35

## 2019-12-13 NOTE — DISCHARGE NOTE PROVIDER - CARE PROVIDER_API CALL
Delfino Durán)  Orthopaedic Surgery  [address] [place] [address], [facility] [address]  [place] [address]  Phone: [phone]  Fax: [phone]  Follow Up Time: 2 weeks

## 2019-12-13 NOTE — DISCHARGE NOTE PROVIDER - NSDCMRMEDTOKEN_GEN_ALL_CORE_FT
acetaminophen 325 mg oral tablet: 3 tab(s) orally every 8 hours  amLODIPine 10 mg oral tablet: 1 tab(s) orally once a day  ascorbic acid 500 mg oral tablet: 1 tab(s) orally once a day  aspirin 81 mg oral delayed release tablet: 1 tab(s) orally 2 times a day  brimonidine-timolol 0.2%-0.5% ophthalmic solution: 1 drop(s) to each affected eye every 12 hours  calcium-vitamin D 500 mg-200 intl units oral tablet: 1 tab(s) orally once a day  lisinopril 40 mg oral tablet: 1 tab(s) orally once a day  melatonin 3 mg oral tablet: 1 tab(s) orally once a day (at bedtime), As needed, Insomnia  oxyCODONE 5 mg oral tablet: 1 tab(s) orally every 4 hours, As needed, Severe Pain (7 - 10)  pantoprazole 40 mg oral delayed release tablet: 1 tab(s) orally once a day (before a meal)  polyethylene glycol 3350 oral powder for reconstitution: 17 gram(s) orally once a day  senna oral tablet: 2 tab(s) orally once a day (at bedtime)

## 2019-12-13 NOTE — DISCHARGE NOTE PROVIDER - NSDCCPCAREPLAN_GEN_ALL_CORE_FT
PRINCIPAL DISCHARGE DIAGNOSIS  Diagnosis: Closed nondisplaced intertrochanteric fracture of left femur, initial encounter  Assessment and Plan of Treatment: Patient is a 89 yo female history of left intertrochanteric fracture s/p left femur intramedullary nail done on 12/12/2019 with Dr. Durán without any intraoperative complications.  Patient is doing well and stable for discharge.  During hospital course, patient was followed by medicine hospitalist for continuity of care. She was transfused 1 U packed red blood cells for post operative anemia. Patient is tolerating physical therapy: weight bearing to left leg, gait training. Keep dressing on as is until day of staple removal.  Staples/sutures to be removed in Dr. Durán's office 14 days from date of surgery.    The patient had no post operative complications and clinically progressed faster than anticipated. Patient was safely and appropriately discharged home. Patient is on Aspirin 81mg twice daily for anticoagulation.  Orthopaedic Surgeon Dr. Durán would like patient to call private MD/Internist for appointment postop to maintain continuity of care.  Follow up with Dr. Durán's office in 1-2 weeks.

## 2019-12-13 NOTE — CONSULT NOTE ADULT - SUBJECTIVE AND OBJECTIVE BOX
HPI:  Patient is a 89 year old female with PMH HTN who presented to [facility] for left hip after fall. Patient reports that she tripped and fell over a chair that got caught on a rug. Patient states she fell onto her left hip and subsequently had pain on that side. Patient came into the ED for further evaluation. In the ED, imaging demonstrated left femoral intertrochanteric fracture with slightlly proximally retracted lesser trochanter avulsion fracture component. Orthopedics was consulted and patient underwent IMN of left femur on 19. Her course was complicated by anemia requiring transfusion. Patient currently WBAT.     Upon evaluation, patient reports that she is doing well. Reports that she only has pain in left leg with movement. Denies chest pain, SOB, abdominal or urinary sx.     Imaging showed:  Hip Xray 19  IMPRESSION:  Acute proximal left femoral intertrochanteric fracture with slightly   proximally retracted lesser trochanter avulsion fracture component. No   dislocations or additional fractures.    Intact pelvic and obturator rings and symmetrically aligned and spaced SI joints and pubic symphysis.    Lower lumbar spine degenerative change apparent. Preserved bilateral hip joint spaces. Advanced left knee tricompartment osteoarthritis including a suprapatellar and lamellated calcified body.    Generalized osteopenia otherwise no discrete lytic or blastic lesions.    Vascular calcifications.    REVIEW OF SYSTEMS  Constitutional - No fever, No fatigue  HEENT - No eye pain, No visual disturbances, No difficulty hearing, No tinnitus, No vertigo, No neck pain  Respiratory - No cough, No wheezing, No shortness of breath  Cardiovascular - No chest pain, No palpitations  Gastrointestinal - No abdominal pain, No nausea, No vomiting, No diarrhea, No constipation  Genitourinary - No dysuria, No frequency, No hematuria, No incontinence  Neurological - No headaches, +loss of strength, No numbness, No tremors  Skin - +surgical incision  Musculoskeletal - +pain in left leg with ambulation  Psychiatric - No depression, No anxiety    VITALS  T(C): 36.9 (19 @ 09:14), Max: 37.6 (19 @ 19:27)  HR: 71 (19 @ 09:14) (66 - 88)  BP: 117/44 (19 @ 09:14) (117/44 - 174/83)  RR: 15 (12-13-19 @ 09:14) (12 - 18)  SpO2: 99% (-19 @ 09:14) (95% - 100%)  Wt(kg): --    PAST MEDICAL & SURGICAL HISTORY  HTN (hypertension)  No significant past surgical history      SOCIAL HISTORY  Smoking - Denied  EtOH - Denied   Drugs - Denied    FUNCTIONAL HISTORY  Lives with  and family in private home.   Independent    CURRENT FUNCTIONAL STATUS      FAMILY HISTORY       RECENT LABS/IMAGING  CBC Full  -  ( 13 Dec 2019 05:43 )  WBC Count : 6.00 K/uL  RBC Count : 2.87 M/uL  Hemoglobin : 8.7 g/dL  Hematocrit : 26.6 %  Platelet Count - Automated : 237 K/uL  Mean Cell Volume : 92.7 fL  Mean Cell Hemoglobin : 30.3 pg  Mean Cell Hemoglobin Concentration : 32.7 %  Auto Neutrophil # : 4.56 K/uL  Auto Lymphocyte # : 0.64 K/uL  Auto Monocyte # : 0.78 K/uL  Auto Eosinophil # : 0.00 K/uL  Auto Basophil # : 0.01 K/uL  Auto Neutrophil % : 75.9 %  Auto Lymphocyte % : 10.7 %  Auto Monocyte % : 13.0 %  Auto Eosinophil % : 0.0 %  Auto Basophil % : 0.2 %        137  |  110<H>  |  11  ----------------------------<  128<H>  3.3<L>   |  19<L>  |  0.72    Ca    8.6      13 Dec 2019 05:43    TPro  x   /  Alb  3.0<L>  /  TBili  x   /  DBili  x   /  AST  x   /  ALT  x   /  AlkPhos  x   12-12    Urinalysis Basic - ( 11 Dec 2019 17:25 )    Color: LIGHT YELLOW / Appearance: CLEAR / S.017 / pH: 6.0  Gluc: NEGATIVE / Ketone: SMALL  / Bili: NEGATIVE / Urobili: NORMAL   Blood: SMALL / Protein: 20 / Nitrite: NEGATIVE   Leuk Esterase: TRACE / RBC: 11-25 / WBC 3-5   Sq Epi: FEW / Non Sq Epi: x / Bacteria: FEW        ALLERGIES  No Known Allergies      MEDICATIONS   acetaminophen   Tablet .. 975 milliGRAM(s) Oral every 8 hours  amLODIPine   Tablet 10 milliGRAM(s) Oral daily  ascorbic acid 500 milliGRAM(s) Oral daily  brimonidine 0.2%/ timolol 0.5% Ophthalmic Solution 1 Drop(s) Both EYES two times a day  calcium carbonate 1250 mG  + Vitamin D (OsCal 500 + D) 1 Tablet(s) Oral daily  influenza  Vaccine (HIGH DOSE) 0.5 milliLiter(s) IntraMuscular once  ketorolac   Injectable 15 milliGRAM(s) IV Push every 8 hours  lisinopril 40 milliGRAM(s) Oral daily  magnesium hydroxide Suspension 30 milliLiter(s) Oral daily PRN  melatonin 3 milliGRAM(s) Oral at bedtime PRN  oxyCODONE    IR 5 milliGRAM(s) Oral every 4 hours PRN  oxyCODONE    IR 2.5 milliGRAM(s) Oral every 4 hours PRN  pantoprazole    Tablet 40 milliGRAM(s) Oral before breakfast  polyethylene glycol 3350 17 Gram(s) Oral daily  senna 2 Tablet(s) Oral at bedtime  sodium chloride 0.9%. 1000 milliLiter(s) IV Continuous <Continuous> HPI:  Patient is a 89 year old female with PMH HTN who presented to [facility] for left hip after fall. Patient reports that she tripped and fell over a chair that got caught on a rug. Patient states she fell onto her left hip and subsequently had pain on that side. Patient came into the ED for further evaluation. In the ED, imaging demonstrated left femoral intertrochanteric fracture with slightlly proximally retracted lesser trochanter avulsion fracture component. Orthopedics was consulted and patient underwent IMN of left femur on 19. Her course was complicated by anemia requiring transfusion. Patient currently WBAT.     Upon evaluation, patient reports that she is doing well. Reports that she only has pain in left leg with movement. Denies chest pain, SOB, abdominal or urinary sx.     Imaging showed:  Hip Xray 19  IMPRESSION:  Acute proximal left femoral intertrochanteric fracture with slightly   proximally retracted lesser trochanter avulsion fracture component. No   dislocations or additional fractures.    Intact pelvic and obturator rings and symmetrically aligned and spaced SI joints and pubic symphysis.    Lower lumbar spine degenerative change apparent. Preserved bilateral hip joint spaces. Advanced left knee tricompartment osteoarthritis including a suprapatellar and lamellated calcified body.    Generalized osteopenia otherwise no discrete lytic or blastic lesions.    Vascular calcifications.    REVIEW OF SYSTEMS  Constitutional - No fever, No fatigue  HEENT - No eye pain, No visual disturbances, No difficulty hearing, No tinnitus, No vertigo, No neck pain  Respiratory - No cough, No wheezing, No shortness of breath  Cardiovascular - No chest pain, No palpitations  Gastrointestinal - No abdominal pain, No nausea, No vomiting, No diarrhea, No constipation  Genitourinary - No dysuria, No frequency, No hematuria, No incontinence  Neurological - No headaches, +loss of strength, No numbness, No tremors  Skin - +surgical incision  Musculoskeletal - +pain in left leg with ambulation  Psychiatric - No depression, No anxiety    VITALS  T(C): 36.9 (19 @ 09:14), Max: 37.6 (19 @ 19:27)  HR: 71 (19 @ 09:14) (66 - 88)  BP: 117/44 (19 @ 09:14) (117/44 - 174/83)  RR: 15 (12-13-19 @ 09:14) (12 - 18)  SpO2: 99% (-19 @ 09:14) (95% - 100%)  Wt(kg): --    PAST MEDICAL & SURGICAL HISTORY  HTN (hypertension)  No significant past surgical history      SOCIAL HISTORY  Smoking - Denied  EtOH - Denied   Drugs - Denied    FUNCTIONAL HISTORY  Lives with  and family in private home. There are 4 RICHARD and 13 steps within to get to second floor with bedroom  Independent with ADLs and mobility with cane PTA.     CURRENT FUNCTIONAL STATUS:  Pending evaluation      FAMILY HISTORY:  non-contributory      RECENT LABS/IMAGING  CBC Full  -  ( 13 Dec 2019 05:43 )  WBC Count : 6.00 K/uL  RBC Count : 2.87 M/uL  Hemoglobin : 8.7 g/dL  Hematocrit : 26.6 %  Platelet Count - Automated : 237 K/uL  Mean Cell Volume : 92.7 fL  Mean Cell Hemoglobin : 30.3 pg  Mean Cell Hemoglobin Concentration : 32.7 %  Auto Neutrophil # : 4.56 K/uL  Auto Lymphocyte # : 0.64 K/uL  Auto Monocyte # : 0.78 K/uL  Auto Eosinophil # : 0.00 K/uL  Auto Basophil # : 0.01 K/uL  Auto Neutrophil % : 75.9 %  Auto Lymphocyte % : 10.7 %  Auto Monocyte % : 13.0 %  Auto Eosinophil % : 0.0 %  Auto Basophil % : 0.2 %        137  |  110<H>  |  11  ----------------------------<  128<H>  3.3<L>   |  19<L>  |  0.72    Ca    8.6      13 Dec 2019 05:43    TPro  x   /  Alb  3.0<L>  /  TBili  x   /  DBili  x   /  AST  x   /  ALT  x   /  AlkPhos  x       Urinalysis Basic - ( 11 Dec 2019 17:25 )    Color: LIGHT YELLOW / Appearance: CLEAR / S.017 / pH: 6.0  Gluc: NEGATIVE / Ketone: SMALL  / Bili: NEGATIVE / Urobili: NORMAL   Blood: SMALL / Protein: 20 / Nitrite: NEGATIVE   Leuk Esterase: TRACE / RBC: 11-25 / WBC 3-5   Sq Epi: FEW / Non Sq Epi: x / Bacteria: FEW        ALLERGIES  No Known Allergies      MEDICATIONS   acetaminophen   Tablet .. 975 milliGRAM(s) Oral every 8 hours  amLODIPine   Tablet 10 milliGRAM(s) Oral daily  ascorbic acid 500 milliGRAM(s) Oral daily  brimonidine 0.2%/ timolol 0.5% Ophthalmic Solution 1 Drop(s) Both EYES two times a day  calcium carbonate 1250 mG  + Vitamin D (OsCal 500 + D) 1 Tablet(s) Oral daily  influenza  Vaccine (HIGH DOSE) 0.5 milliLiter(s) IntraMuscular once  ketorolac   Injectable 15 milliGRAM(s) IV Push every 8 hours  lisinopril 40 milliGRAM(s) Oral daily  magnesium hydroxide Suspension 30 milliLiter(s) Oral daily PRN  melatonin 3 milliGRAM(s) Oral at bedtime PRN  oxyCODONE    IR 5 milliGRAM(s) Oral every 4 hours PRN  oxyCODONE    IR 2.5 milliGRAM(s) Oral every 4 hours PRN  pantoprazole    Tablet 40 milliGRAM(s) Oral before breakfast  polyethylene glycol 3350 17 Gram(s) Oral daily  senna 2 Tablet(s) Oral at bedtime  sodium chloride 0.9%. 1000 milliLiter(s) IV Continuous <Continuous>    ----------------------------------------------------------------------------------------  PHYSICAL EXAM  Constitutional - NAD, Comfortable  HEENT - NCAT  Chest - breathing comfortably, no increased work of breathing  Cardiovascular - warm, well perfused  Abdomen - Soft, NTND  Extremities - left hip surgical incision with overlying dressing, c/d/i, surrounding edema, non-TTP  Neurologic Exam -                    Cognitive - Awake, Alert, oriented to self, place, date, year, situation     Communication - Fluent     Cranial Nerves - No facial asymmetry     Motor -                     LEFT    UE - ShAB 5/5, EF 5/5, EE 5/5, WE 5/5,  5/5                    RIGHT UE - ShAB 5/5, EF 5/5, EE 5/5, WE 5/5,  5/5                    LEFT    LE - HF 1/5, KE 1/5, DF 5/5, PF 5/5                    RIGHT LE - HF 4+/5, KE 5/5, DF 5/5, PF 5/5        Sensory - Intact to LT     Reflexes - Negative Babinski BL     Coordination - FTN intact  Psychiatric - Mood stable, Affect WNL  Skin - as above  ----------------------------------------------------------------------------------------

## 2019-12-13 NOTE — DISCHARGE NOTE PROVIDER - HOSPITAL COURSE
Patient is a 89 yo female history of left intertrochanteric fracture s/p left femur intramedullary nail done on 12/12/2019 with Dr. Durán without any intraoperative complications.  Patient is doing well and stable for discharge.  During hospital course, patient was followed by medicine hospitalist for continuity of care. She was transfused 1 U packed red blood cells for post operative anemia. Patient is tolerating physical therapy: weight bearing to left leg, gait training. Keep dressing on as is until day of staple removal.  Staples/sutures to be removed in Dr. Durán's office 14 days from date of surgery.      The patient had no post operative complications and clinically progressed faster than anticipated. Patient was safely and appropriately discharged home. Patient is on Aspirin 81mg twice daily for anticoagulation.  Orthopaedic Surgeon Dr. Durán would like patient to call private MD/Internist for appointment postop to maintain continuity of care.  Follow up with Dr. Durán's office in 1-2 weeks. Patient is a 89 yo female history of left intertrochanteric fracture s/p left femur intramedullary nail done on 12/12/2019 with Dr. Durán without any intraoperative complications.  Patient is doing well and stable for discharge.  During hospital course, patient was followed by medicine hospitalist for continuity of care. She was transfused 1 U packed red blood cells for post operative anemia. Patient is tolerating physical therapy: weight bearing to left leg, gait training. Keep dressing on as is until day of staple removal.  Staples/sutures to be removed in Dr. Durán's office 14 days from date of surgery.   Patient is on Aspirin 81mg twice daily for anticoagulation.  Orthopaedic Surgeon Dr. Durán would like patient to call private MD/Internist for appointment postop to maintain continuity of care.  Follow up with Dr. Durán's office in 1-2 weeks.

## 2019-12-13 NOTE — DISCHARGE NOTE PROVIDER - NSDCCPTREATMENT_GEN_ALL_CORE_FT
PRINCIPAL PROCEDURE  Procedure: Intramedullary nailing of femur  Findings and Treatment: See dictated operative note

## 2019-12-13 NOTE — PHYSICAL THERAPY INITIAL EVALUATION ADULT - CRITERIA FOR SKILLED THERAPEUTIC INTERVENTIONS
rehab potential/predicted duration of therapy intervention/risk reduction/prevention/impairments found/therapy frequency/functional limitations in following categories/anticipated discharge recommendation

## 2019-12-13 NOTE — PROGRESS NOTE ADULT - SUBJECTIVE AND OBJECTIVE BOX
ANESTHESIA POSTOP CHECK    89y Female POSTOP DAY 1 S/P left hip IM nail    Vital Signs Last 24 Hrs  T(C): 36.9 (13 Dec 2019 09:14), Max: 37.6 (12 Dec 2019 19:27)  T(F): 98.5 (13 Dec 2019 09:14), Max: 99.6 (12 Dec 2019 19:27)  HR: 71 (13 Dec 2019 09:14) (65 - 88)  BP: 117/44 (13 Dec 2019 09:14) (117/44 - 174/83)  BP(mean): 90 (12 Dec 2019 18:00) (71 - 107)  RR: 15 (13 Dec 2019 09:14) (12 - 18)  SpO2: 99% (13 Dec 2019 09:14) (95% - 100%)  I&O's Summary    12 Dec 2019 07:01  -  13 Dec 2019 07:00  --------------------------------------------------------  IN: 425 mL / OUT: 350 mL / NET: 75 mL        [x ] NO APPARENT ANESTHESIA COMPLICATIONS

## 2019-12-13 NOTE — OCCUPATIONAL THERAPY INITIAL EVALUATION ADULT - PHYSICAL ASSIST/NONPHYSICAL ASSIST: BED TO CHAIR, REHAB EVAL
Patient:   NATTY BROWN            MRN: [id number]            FIN: [id number]              Age:   40 hours     Sex:  MALE     :  19   Associated Diagnoses:   None   Author:   GENOVEVA COSTELLO     Basic Information   Present at bedside:  Mother, Father.      Review of Systems   Not applicable:  Patient is .    Joe is doing well.  Mom is breastfeeding, baby has had multiple voids/stools.  BW: 3380g  CW: 3320g (down 4%)     Physical Examination   VS/Measurements   Vital Signs   19 02:30 CDT Temperature - VS 37.0 deg_C  Normal    Temperature Source - VS Axillary    Heart/Pulse Rate 140  Normal    Pulse Source Apical    Respiration Rate 38 breaths/min  Normal       General:  No acute distress, Alert.    Eye:       Red reflex: Bilaterally, Present.    HENT:  Normocephalic, Anterior fontanelle open/soft/flat, Palate intact.   Neck:  Supple, Clavicles intact.    Respiratory:  Lungs are clear to auscultation, Respirations are non-labored.   Cardiovascular:  Normal rate, Regular rhythm, No murmur.    Gastrointestinal:  Soft, Non-distended.    Genitourinary:  Normal genitalia for age and sex (renata stage 1 male-uncircumcised).   Musculoskeletal     Normal range of motion.     Normal Mcdowell's.     Normal Ortolani's.     Integumentary:  Warm, Pink.    Neurologic:  Alert, Moves all extremities appropriately.    Labs:   T/D bili: 5.7/0.2 @ 27 hours: low risk zone   Blood type: O+/Grace -  Assessment  FT  male  C/S  Mom initially declined vit K-was given after discussion with peds  Parents decline Hep B  Plan:  Continue Routine Care  Hearing screen pending  Likely D/C tomorrow  Rec f/u 2 days after discharge with pediatrician in [place]   1 person assist

## 2019-12-13 NOTE — PHYSICAL THERAPY INITIAL EVALUATION ADULT - LIVES WITH, PROFILE
(who has dementia), daughter and great granddaughters in a home with 4 steps to enter, 1 flight of stairs to bedroom and bathroom

## 2019-12-13 NOTE — PROGRESS NOTE ADULT - SUBJECTIVE AND OBJECTIVE BOX
Patient is a 89y old  Female who presents with a chief complaint of Left Hip Intertrochanteric Fracture (13 Dec 2019 09:40)      SUBJECTIVE / OVERNIGHT EVENTS: Pt generally feeling well. Denies significant hip/thigh pain. No chest pain, dyspnea, or palpitations. Ambulated with PT across her room and back.    MEDICATIONS  (STANDING):  acetaminophen   Tablet .. 975 milliGRAM(s) Oral every 8 hours  amLODIPine   Tablet 10 milliGRAM(s) Oral daily  ascorbic acid 500 milliGRAM(s) Oral daily  brimonidine 0.2%/ timolol 0.5% Ophthalmic Solution 1 Drop(s) Both EYES two times a day  calcium carbonate 1250 mG  + Vitamin D (OsCal 500 + D) 1 Tablet(s) Oral daily  influenza  Vaccine (HIGH DOSE) 0.5 milliLiter(s) IntraMuscular once  ketorolac   Injectable 15 milliGRAM(s) IV Push every 8 hours  lisinopril 40 milliGRAM(s) Oral daily  pantoprazole    Tablet 40 milliGRAM(s) Oral before breakfast  polyethylene glycol 3350 17 Gram(s) Oral daily  senna 2 Tablet(s) Oral at bedtime  sodium chloride 0.9%. 1000 milliLiter(s) (75 mL/Hr) IV Continuous <Continuous>    MEDICATIONS  (PRN):  magnesium hydroxide Suspension 30 milliLiter(s) Oral daily PRN Constipation  melatonin 3 milliGRAM(s) Oral at bedtime PRN Insomnia  oxyCODONE    IR 5 milliGRAM(s) Oral every 4 hours PRN Severe Pain (7 - 10)  oxyCODONE    IR 2.5 milliGRAM(s) Oral every 4 hours PRN Mild to Moderate pain      CAPILLARY BLOOD GLUCOSE        I&O's Summary    12 Dec 2019 07:01  -  13 Dec 2019 07:00  --------------------------------------------------------  IN: 425 mL / OUT: 350 mL / NET: 75 mL        PHYSICAL EXAM:  Vital Signs Last 24 Hrs  T(C): 36.9 (13 Dec 2019 09:14), Max: 37.6 (12 Dec 2019 19:27)  T(F): 98.5 (13 Dec 2019 09:14), Max: 99.6 (12 Dec 2019 19:27)  HR: 71 (13 Dec 2019 09:14) (66 - 88)  BP: 117/44 (13 Dec 2019 09:14) (117/44 - 174/83)  BP(mean): 90 (12 Dec 2019 18:00) (71 - 107)  RR: 15 (13 Dec 2019 09:14) (12 - 18)  SpO2: 99% (13 Dec 2019 09:14) (95% - 100%)  CONSTITUTIONAL: thin older woman, NAD  EYES: PERRLA; conjunctiva and sclera clear  ENMT: Moist oral mucosa, no pharyngeal injection or exudates; normal dentition  NECK: Supple, no palpable masses; no thyromegaly  RESPIRATORY: Normal respiratory effort; lungs are clear to auscultation bilaterally  CARDIOVASCULAR: Regular rate and rhythm, normal S1 and S2, no murmur/rub/gallop; No lower extremity edema; Peripheral pulses are 2+ bilaterally  ABDOMEN: Nontender to palpation, normoactive bowel sounds, no rebound/guarding; No hepatosplenomegaly    LABS:                        8.7    6.00  )-----------( 237      ( 13 Dec 2019 05:43 )             26.6         137  |  110<H>  |  11  ----------------------------<  128<H>  3.3<L>   |  19<L>  |  0.72    Ca    8.6      13 Dec 2019 05:43    TPro  x   /  Alb  3.0<L>  /  TBili  x   /  DBili  x   /  AST  x   /  ALT  x   /  AlkPhos  x   12    PT/INR - ( 12 Dec 2019 04:30 )   PT: 13.4 SEC;   INR: 1.20          PTT - ( 12 Dec 2019 04:30 )  PTT:27.2 SEC      Urinalysis Basic - ( 11 Dec 2019 17:25 )    Color: LIGHT YELLOW / Appearance: CLEAR / S.017 / pH: 6.0  Gluc: NEGATIVE / Ketone: SMALL  / Bili: NEGATIVE / Urobili: NORMAL   Blood: SMALL / Protein: 20 / Nitrite: NEGATIVE   Leuk Esterase: TRACE / RBC: 11-25 / WBC 3-5   Sq Epi: FEW / Non Sq Epi: x / Bacteria: FEW

## 2019-12-13 NOTE — PROGRESS NOTE ADULT - ASSESSMENT
Shell Powell is a very pleasant 89 year old lady with history of osteoporosis and HTN (on amlodipine and lisinopril) p/w with left intertrochanteric fracture as a result of a mechanical fall, now s/p IMN.

## 2019-12-13 NOTE — DISCHARGE NOTE PROVIDER - NSDCACTIVITY_GEN_ALL_CORE
Do not drive or operate machinery/Walking - Outdoors allowed/Stairs allowed/Showering allowed/No heavy lifting/straining/Do not make important decisions/Walking - Indoors allowed

## 2019-12-13 NOTE — PHYSICAL THERAPY INITIAL EVALUATION ADULT - PERTINENT HX OF CURRENT PROBLEM, REHAB EVAL
Patient is a 89 year old female admitted to [facility] following a mechanical fall in home, found to have hip fracture, now s/p IMN to left femur. PMH includes HTN

## 2019-12-13 NOTE — PROGRESS NOTE ADULT - SUBJECTIVE AND OBJECTIVE BOX
Pt seen and examined. Doing well. Minimal pain. Has not yet been OOB.  No acute events overnight.     Vital Signs Last 24 Hrs  T(C): 36.8 (13 Dec 2019 05:40), Max: 37.6 (12 Dec 2019 19:27)  T(F): 98.2 (13 Dec 2019 05:40), Max: 99.6 (12 Dec 2019 19:27)  HR: 66 (13 Dec 2019 05:40) (65 - 88)  BP: 141/47 (13 Dec 2019 05:40) (123/48 - 174/83)  BP(mean): 90 (12 Dec 2019 18:00) (71 - 107)  RR: 18 (13 Dec 2019 05:40) (12 - 18)  SpO2: 100% (13 Dec 2019 05:40) (95% - 100%)    PE:  NAD  LLE hip dressing C/D/I  Compartments soft and compressible  EHL/FHL/TA/GS intact  SILT SP/DP/T/S/S  WWP brisk cap refill                        10.0   8.83  )-----------( 243      ( 12 Dec 2019 16:10 )             30.8     12-12    140  |  111<H>  |  12  ----------------------------<  128<H>  3.6   |  19<L>  |  0.68    Ca    8.8      12 Dec 2019 16:10    TPro  x   /  Alb  3.0<L>  /  TBili  x   /  DBili  x   /  AST  x   /  ALT  x   /  AlkPhos  x   12-12      89yFemale s/p L hip IMN  - WBAT  - PT/OOB  - IS  - Pain control  - DVT ppx  - Dispo planning

## 2019-12-13 NOTE — OCCUPATIONAL THERAPY INITIAL EVALUATION ADULT - PERTINENT HX OF CURRENT PROBLEM, REHAB EVAL
Patient is a 89 year old female s/p fall with resulting Left Intertrochanteric fracture. Pt is now s/p left hip IM nail.

## 2019-12-13 NOTE — CONSULT NOTE ADULT - ASSESSMENT
------------------------------------------------------------------------------------------------  ASSESSMENT/PLAN  89yFemale with functional deficits after fall found to have left intertrochanteric fracture s/p IMN.   Left intertrochanteric fracture s/p IMN - WBAT, bedside therapy  Pain - Toradol, Oxycodone, Tylenol  DVT PPX - ASA BID, SCDs  PT- ROM, Bed Mob, Transfers, Amb w AD and bracing as needed  OT- ADLs, bracing as needed  Prec- Falls, Cardiac  Skin- Turn q2 h  Dispo- Once medically stabilized, recommend ACUTE inpatient rehabilitation for the functional deficits consisting of 3 hours of therapy/day & 24 hour RN/daily PMR physician for comorbid medical management. Will continue to follow for ongoing rehab needs and recommendations. Patient will be able to tolerate 3 hours a day. FINAL DISPO RECS INCOMPLETE UNTIL ATTENDING ROUNDS ------------------------------------------------------------------------------------------------  ASSESSMENT/PLAN  89yFemale with functional deficits after fall found to have left intertrochanteric fracture s/p IMN.   Left intertrochanteric fracture s/p IMN - WBAT, bedside therapy  Pain - Toradol, Oxycodone, Tylenol  DVT PPX - ASA BID, SCDs  PT- ROM, Bed Mob, Transfers, Amb w AD and bracing as needed  OT- ADLs, bracing as needed  Prec- Falls, Cardiac  Skin- Turn q2 h  Dispo- Once medically stabilized, recommend ACUTE inpatient rehabilitation for the functional deficits consisting of 3 hours of therapy/day & 24 hour RN/daily PMR physician for comorbid medical management. Will continue to follow for ongoing rehab needs and recommendations. Patient will be able to tolerate 3 hours a day.

## 2019-12-13 NOTE — PROGRESS NOTE ADULT - PROBLEM SELECTOR PLAN 2
BP currently at goal, <140/90. Continue amlodipine and lisinopril. Continue to monitor potassium while repleting to goal 4.

## 2019-12-14 LAB
ANION GAP SERPL CALC-SCNC: 11 MMO/L — SIGNIFICANT CHANGE UP (ref 7–14)
BUN SERPL-MCNC: 20 MG/DL — SIGNIFICANT CHANGE UP (ref 7–23)
CALCIUM SERPL-MCNC: 9 MG/DL — SIGNIFICANT CHANGE UP (ref 8.4–10.5)
CHLORIDE SERPL-SCNC: 108 MMOL/L — HIGH (ref 98–107)
CO2 SERPL-SCNC: 16 MMOL/L — LOW (ref 22–31)
CREAT SERPL-MCNC: 1.07 MG/DL — SIGNIFICANT CHANGE UP (ref 0.5–1.3)
GLUCOSE SERPL-MCNC: 92 MG/DL — SIGNIFICANT CHANGE UP (ref 70–99)
HCT VFR BLD CALC: 28.1 % — LOW (ref 34.5–45)
HGB BLD-MCNC: 9.1 G/DL — LOW (ref 11.5–15.5)
MAGNESIUM SERPL-MCNC: 1.8 MG/DL — SIGNIFICANT CHANGE UP (ref 1.6–2.6)
MCHC RBC-ENTMCNC: 30 PG — SIGNIFICANT CHANGE UP (ref 27–34)
MCHC RBC-ENTMCNC: 32.4 % — SIGNIFICANT CHANGE UP (ref 32–36)
MCV RBC AUTO: 92.7 FL — SIGNIFICANT CHANGE UP (ref 80–100)
NRBC # FLD: 0 K/UL — SIGNIFICANT CHANGE UP (ref 0–0)
PLATELET # BLD AUTO: 210 K/UL — SIGNIFICANT CHANGE UP (ref 150–400)
PMV BLD: 10.6 FL — SIGNIFICANT CHANGE UP (ref 7–13)
POTASSIUM SERPL-MCNC: 4.2 MMOL/L — SIGNIFICANT CHANGE UP (ref 3.5–5.3)
POTASSIUM SERPL-SCNC: 4.2 MMOL/L — SIGNIFICANT CHANGE UP (ref 3.5–5.3)
RBC # BLD: 3.03 M/UL — LOW (ref 3.8–5.2)
RBC # FLD: 13.5 % — SIGNIFICANT CHANGE UP (ref 10.3–14.5)
SODIUM SERPL-SCNC: 135 MMOL/L — SIGNIFICANT CHANGE UP (ref 135–145)
WBC # BLD: 5.31 K/UL — SIGNIFICANT CHANGE UP (ref 3.8–10.5)
WBC # FLD AUTO: 5.31 K/UL — SIGNIFICANT CHANGE UP (ref 3.8–10.5)

## 2019-12-14 PROCEDURE — 99233 SBSQ HOSP IP/OBS HIGH 50: CPT

## 2019-12-14 RX ADMIN — Medication 81 MILLIGRAM(S): at 17:48

## 2019-12-14 RX ADMIN — SENNA PLUS 2 TABLET(S): 8.6 TABLET ORAL at 21:49

## 2019-12-14 RX ADMIN — Medication 975 MILLIGRAM(S): at 21:49

## 2019-12-14 RX ADMIN — Medication 1 TABLET(S): at 13:25

## 2019-12-14 RX ADMIN — Medication 975 MILLIGRAM(S): at 13:25

## 2019-12-14 RX ADMIN — PANTOPRAZOLE SODIUM 40 MILLIGRAM(S): 20 TABLET, DELAYED RELEASE ORAL at 08:01

## 2019-12-14 RX ADMIN — Medication 500 MILLIGRAM(S): at 13:25

## 2019-12-14 RX ADMIN — POLYETHYLENE GLYCOL 3350 17 GRAM(S): 17 POWDER, FOR SOLUTION ORAL at 13:26

## 2019-12-14 RX ADMIN — Medication 975 MILLIGRAM(S): at 05:25

## 2019-12-14 NOTE — PROGRESS NOTE ADULT - SUBJECTIVE AND OBJECTIVE BOX
Pt seen and examined. No acute events overnight. Stable.    Vital Signs Last 24 Hrs  Vital Signs Last 24 Hrs  T(C): 36.2 (13 Dec 2019 21:57), Max: 37.2 (13 Dec 2019 01:52)  T(F): 97.2 (13 Dec 2019 21:57), Max: 99 (13 Dec 2019 01:52)  HR: 57 (13 Dec 2019 21:57) (57 - 71)  BP: 105/48 (13 Dec 2019 21:57) (105/48 - 142/44)  BP(mean): 61 (13 Dec 2019 21:57) (61 - 61)  RR: 16 (13 Dec 2019 21:57) (15 - 18)  SpO2: 100% (13 Dec 2019 21:57) (99% - 100%)    PE:  NAD  LLE hip dressing C/D/I  Compartments soft and compressible  EHL/FHL/TA/GS intact  SILT SP/DP/T/S/S  WWP brisk cap refill                        10.0   8.83  )-----------( 243      ( 12 Dec 2019 16:10 )             30.8     12-12    140  |  111<H>  |  12  ----------------------------<  128<H>  3.6   |  19<L>  |  0.68    Ca    8.8      12 Dec 2019 16:10    TPro  x   /  Alb  3.0<L>  /  TBili  x   /  DBili  x   /  AST  x   /  ALT  x   /  AlkPhos  x   12-12      89yFemale s/p L hip IMN  - WBAT  - PT/OOB  - IS  - Pain control  - DVT ppx  - Dispo planning

## 2019-12-14 NOTE — PROGRESS NOTE ADULT - SUBJECTIVE AND OBJECTIVE BOX
Patient is a 89y old  Female who presents with a chief complaint of Left Hip Intertrochanteric Fracture (14 Dec 2019 01:14)      SUBJECTIVE / OVERNIGHT EVENTS:    MEDICATIONS  (STANDING):  acetaminophen   Tablet .. 975 milliGRAM(s) Oral every 8 hours  amLODIPine   Tablet 10 milliGRAM(s) Oral daily  ascorbic acid 500 milliGRAM(s) Oral daily  aspirin enteric coated 81 milliGRAM(s) Oral two times a day  brimonidine 0.2%/ timolol 0.5% Ophthalmic Solution 1 Drop(s) Both EYES two times a day  calcium carbonate 1250 mG  + Vitamin D (OsCal 500 + D) 1 Tablet(s) Oral daily  influenza  Vaccine (HIGH DOSE) 0.5 milliLiter(s) IntraMuscular once  lisinopril 40 milliGRAM(s) Oral daily  pantoprazole    Tablet 40 milliGRAM(s) Oral before breakfast  polyethylene glycol 3350 17 Gram(s) Oral daily  senna 2 Tablet(s) Oral at bedtime  sodium chloride 0.9%. 1000 milliLiter(s) (75 mL/Hr) IV Continuous <Continuous>    MEDICATIONS  (PRN):  magnesium hydroxide Suspension 30 milliLiter(s) Oral daily PRN Constipation  melatonin 3 milliGRAM(s) Oral at bedtime PRN Insomnia  oxyCODONE    IR 5 milliGRAM(s) Oral every 4 hours PRN Severe Pain (7 - 10)  oxyCODONE    IR 2.5 milliGRAM(s) Oral every 4 hours PRN Mild to Moderate pain      Vital Signs Last 24 Hrs  T(C): 36.9 (14 Dec 2019 05:20), Max: 37.1 (13 Dec 2019 17:37)  T(F): 98.4 (14 Dec 2019 05:20), Max: 98.8 (13 Dec 2019 17:37)  HR: 58 (14 Dec 2019 05:20) (54 - 71)  BP: 125/46 (14 Dec 2019 05:20) (105/48 - 130/50)  BP(mean): 61 (13 Dec 2019 21:57) (61 - 61)  RR: 16 (14 Dec 2019 05:20) (15 - 16)  SpO2: 99% (14 Dec 2019 05:20) (99% - 100%)  CAPILLARY BLOOD GLUCOSE        I&O's Summary    13 Dec 2019 07:01  -  14 Dec 2019 07:00  --------------------------------------------------------  IN: 0 mL / OUT: 150 mL / NET: -150 mL        PHYSICAL EXAM:  GENERAL: NAD, well-developed  HEAD:  Atraumatic, Normocephalic  EYES: EOMI, PERRLA, conjunctiva and sclera clear  NECK: Supple, No JVD  CHEST/LUNG: Clear to auscultation bilaterally; No wheeze  HEART: Regular rate and rhythm; No murmurs, rubs, or gallops  ABDOMEN: Soft, Nontender, Nondistended; Bowel sounds present  EXTREMITIES:  2+ Peripheral Pulses, No clubbing, cyanosis, or edema  PSYCH: AAOx3  NEUROLOGY: non-focal  SKIN: No rashes or lesions    LABS:                        9.1    5.31  )-----------( 210      ( 14 Dec 2019 06:45 )             28.1     12-14    135  |  108<H>  |  20  ----------------------------<  92  4.2   |  16<L>  |  1.07    Ca    9.0      14 Dec 2019 06:45  Mg     1.8     12-14                RADIOLOGY & ADDITIONAL TESTS:    Imaging Personally Reviewed:    Consultant(s) Notes Reviewed:      Care Discussed with Consultants/Other Providers: Patient is a 89y old  Female who presents with a chief complaint of Left Hip Intertrochanteric Fracture (14 Dec 2019 01:14)      SUBJECTIVE / OVERNIGHT EVENTS:  Patient has no new complaints. Denies cp, SOB, abdominal pain, N/V/D     MEDICATIONS  (STANDING):  acetaminophen   Tablet .. 975 milliGRAM(s) Oral every 8 hours  amLODIPine   Tablet 10 milliGRAM(s) Oral daily  ascorbic acid 500 milliGRAM(s) Oral daily  aspirin enteric coated 81 milliGRAM(s) Oral two times a day  brimonidine 0.2%/ timolol 0.5% Ophthalmic Solution 1 Drop(s) Both EYES two times a day  calcium carbonate 1250 mG  + Vitamin D (OsCal 500 + D) 1 Tablet(s) Oral daily  influenza  Vaccine (HIGH DOSE) 0.5 milliLiter(s) IntraMuscular once  lisinopril 40 milliGRAM(s) Oral daily  pantoprazole    Tablet 40 milliGRAM(s) Oral before breakfast  polyethylene glycol 3350 17 Gram(s) Oral daily  senna 2 Tablet(s) Oral at bedtime  sodium chloride 0.9%. 1000 milliLiter(s) (75 mL/Hr) IV Continuous <Continuous>    MEDICATIONS  (PRN):  magnesium hydroxide Suspension 30 milliLiter(s) Oral daily PRN Constipation  melatonin 3 milliGRAM(s) Oral at bedtime PRN Insomnia  oxyCODONE    IR 5 milliGRAM(s) Oral every 4 hours PRN Severe Pain (7 - 10)  oxyCODONE    IR 2.5 milliGRAM(s) Oral every 4 hours PRN Mild to Moderate pain      Vital Signs Last 24 Hrs  T(C): 36.9 (14 Dec 2019 05:20), Max: 37.1 (13 Dec 2019 17:37)  T(F): 98.4 (14 Dec 2019 05:20), Max: 98.8 (13 Dec 2019 17:37)  HR: 58 (14 Dec 2019 05:20) (54 - 71)  BP: 125/46 (14 Dec 2019 05:20) (105/48 - 130/50)  BP(mean): 61 (13 Dec 2019 21:57) (61 - 61)  RR: 16 (14 Dec 2019 05:20) (15 - 16)  SpO2: 99% (14 Dec 2019 05:20) (99% - 100%)  CAPILLARY BLOOD GLUCOSE        I&O's Summary    13 Dec 2019 07:01  -  14 Dec 2019 07:00  --------------------------------------------------------  IN: 0 mL / OUT: 150 mL / NET: -150 mL        PHYSICAL EXAM:  GENERAL: NAD, well-developed  HEAD:  Atraumatic, Normocephalic  EYES: EOMI, PERRLA, conjunctiva and sclera clear  NECK: Supple, No JVD  CHEST/LUNG: Clear to auscultation bilaterally; No wheeze  HEART: Regular rate and rhythm; No murmurs, rubs, or gallops  ABDOMEN: Soft, Nontender, Nondistended; Bowel sounds present  EXTREMITIES:  2+ Peripheral Pulses, No clubbing, cyanosis, or edema  PSYCH: AAOx3  NEUROLOGY: non-focal  SKIN: No rashes or lesions    LABS:                        9.1    5.31  )-----------( 210      ( 14 Dec 2019 06:45 )             28.1     12-14    135  |  108<H>  |  20  ----------------------------<  92  4.2   |  16<L>  |  1.07    Ca    9.0      14 Dec 2019 06:45  Mg     1.8     12-14                RADIOLOGY & ADDITIONAL TESTS:    Imaging Personally Reviewed:    Consultant(s) Notes Reviewed:      Care Discussed with Consultants/Other Providers: Ortho    ***History Limited to due to:   [ ] Dementia   [ ] Delirium   [ ] Condition    ***Delirium screen:   Patient knows the day of the week: [x ] YES [ ] NO  Patient can state the days of the week or months of the year backwards: [x ] YES [ ] NO    ***Function:   [ x] Independent  [ ] Assistance  [ ] Total care  [ ] Non-ambulatory    ***Living Situation/home resources:  [ ] Lives alone  [x ] Lives with family  [ ] Has a home health aide    ***Adavanced Directives:  [ ] DNR  [ ] No feeding tube  [ ] MOLST in chart  [ ] MOLST completed today   [ ] Unknown    ***Projected Discharge Plan:  [ ] Home  [ ] Rehab  [ ] SNF  [ ] Other living facility    ***Discussion:  [ ] Discharge plan discussed with patient and family

## 2019-12-14 NOTE — PROGRESS NOTE ADULT - ASSESSMENT
89 year old Female w/ hx osteoporosis, HTN p/w with left intertrochanteric fracture as a result of a mechanical fall, now s/p IMN on 12/12/19.

## 2019-12-14 NOTE — PROGRESS NOTE ADULT - PROBLEM SELECTOR PLAN 1
S/p IMN on 12/12/19. Post-op care including analgesia and VTE ppx per Ortho. S/p IMN on 12/12/19.   pain controlled  encouraged incentive spirometry and PT.

## 2019-12-15 LAB
ANION GAP SERPL CALC-SCNC: 10 MMO/L — SIGNIFICANT CHANGE UP (ref 7–14)
BUN SERPL-MCNC: 25 MG/DL — HIGH (ref 7–23)
CALCIUM SERPL-MCNC: 9.2 MG/DL — SIGNIFICANT CHANGE UP (ref 8.4–10.5)
CHLORIDE SERPL-SCNC: 103 MMOL/L — SIGNIFICANT CHANGE UP (ref 98–107)
CO2 SERPL-SCNC: 18 MMOL/L — LOW (ref 22–31)
CREAT SERPL-MCNC: 1.22 MG/DL — SIGNIFICANT CHANGE UP (ref 0.5–1.3)
GLUCOSE SERPL-MCNC: 101 MG/DL — HIGH (ref 70–99)
HCT VFR BLD CALC: 30.3 % — LOW (ref 34.5–45)
HGB BLD-MCNC: 9.9 G/DL — LOW (ref 11.5–15.5)
MCHC RBC-ENTMCNC: 30.2 PG — SIGNIFICANT CHANGE UP (ref 27–34)
MCHC RBC-ENTMCNC: 32.7 % — SIGNIFICANT CHANGE UP (ref 32–36)
MCV RBC AUTO: 92.4 FL — SIGNIFICANT CHANGE UP (ref 80–100)
NRBC # FLD: 0 K/UL — SIGNIFICANT CHANGE UP (ref 0–0)
PLATELET # BLD AUTO: 269 K/UL — SIGNIFICANT CHANGE UP (ref 150–400)
PMV BLD: 9.7 FL — SIGNIFICANT CHANGE UP (ref 7–13)
POTASSIUM SERPL-MCNC: 4.2 MMOL/L — SIGNIFICANT CHANGE UP (ref 3.5–5.3)
POTASSIUM SERPL-SCNC: 4.2 MMOL/L — SIGNIFICANT CHANGE UP (ref 3.5–5.3)
RBC # BLD: 3.28 M/UL — LOW (ref 3.8–5.2)
RBC # FLD: 13.2 % — SIGNIFICANT CHANGE UP (ref 10.3–14.5)
SODIUM SERPL-SCNC: 131 MMOL/L — LOW (ref 135–145)
WBC # BLD: 5.66 K/UL — SIGNIFICANT CHANGE UP (ref 3.8–10.5)
WBC # FLD AUTO: 5.66 K/UL — SIGNIFICANT CHANGE UP (ref 3.8–10.5)

## 2019-12-15 PROCEDURE — 99233 SBSQ HOSP IP/OBS HIGH 50: CPT

## 2019-12-15 RX ADMIN — Medication 1 TABLET(S): at 12:31

## 2019-12-15 RX ADMIN — Medication 975 MILLIGRAM(S): at 13:56

## 2019-12-15 RX ADMIN — SENNA PLUS 2 TABLET(S): 8.6 TABLET ORAL at 21:54

## 2019-12-15 RX ADMIN — Medication 81 MILLIGRAM(S): at 05:25

## 2019-12-15 RX ADMIN — Medication 975 MILLIGRAM(S): at 05:25

## 2019-12-15 RX ADMIN — Medication 975 MILLIGRAM(S): at 21:54

## 2019-12-15 RX ADMIN — AMLODIPINE BESYLATE 10 MILLIGRAM(S): 2.5 TABLET ORAL at 05:25

## 2019-12-15 RX ADMIN — POLYETHYLENE GLYCOL 3350 17 GRAM(S): 17 POWDER, FOR SOLUTION ORAL at 12:31

## 2019-12-15 RX ADMIN — Medication 500 MILLIGRAM(S): at 12:31

## 2019-12-15 RX ADMIN — PANTOPRAZOLE SODIUM 40 MILLIGRAM(S): 20 TABLET, DELAYED RELEASE ORAL at 05:25

## 2019-12-15 RX ADMIN — Medication 81 MILLIGRAM(S): at 18:54

## 2019-12-15 NOTE — PROGRESS NOTE ADULT - SUBJECTIVE AND OBJECTIVE BOX
Patient is a 89y old  Female who presents with a chief complaint of Left Hip Intertrochanteric Fracture (15 Dec 2019 06:53)      SUBJECTIVE / OVERNIGHT EVENTS:  Patient has no new complaints. Denies cp, SOB, abdominal pain, N/V/D     MEDICATIONS  (STANDING):  acetaminophen   Tablet .. 975 milliGRAM(s) Oral every 8 hours  amLODIPine   Tablet 10 milliGRAM(s) Oral daily  ascorbic acid 500 milliGRAM(s) Oral daily  aspirin enteric coated 81 milliGRAM(s) Oral two times a day  brimonidine 0.2%/ timolol 0.5% Ophthalmic Solution 1 Drop(s) Both EYES two times a day  calcium carbonate 1250 mG  + Vitamin D (OsCal 500 + D) 1 Tablet(s) Oral daily  influenza  Vaccine (HIGH DOSE) 0.5 milliLiter(s) IntraMuscular once  lisinopril 40 milliGRAM(s) Oral daily  pantoprazole    Tablet 40 milliGRAM(s) Oral before breakfast  polyethylene glycol 3350 17 Gram(s) Oral daily  senna 2 Tablet(s) Oral at bedtime  sodium chloride 0.9%. 1000 milliLiter(s) (75 mL/Hr) IV Continuous <Continuous>    MEDICATIONS  (PRN):  magnesium hydroxide Suspension 30 milliLiter(s) Oral daily PRN Constipation  melatonin 3 milliGRAM(s) Oral at bedtime PRN Insomnia  oxyCODONE    IR 5 milliGRAM(s) Oral every 4 hours PRN Severe Pain (7 - 10)  oxyCODONE    IR 2.5 milliGRAM(s) Oral every 4 hours PRN Mild to Moderate pain      Vital Signs Last 24 Hrs  T(C): 36.8 (15 Dec 2019 09:22), Max: 36.9 (14 Dec 2019 15:01)  T(F): 98.3 (15 Dec 2019 09:22), Max: 98.4 (14 Dec 2019 15:01)  HR: 60 (15 Dec 2019 09:22) (58 - 62)  BP: 144/56 (15 Dec 2019 09:22) (128/56 - 153/57)  BP(mean): --  RR: 15 (15 Dec 2019 09:22) (15 - 16)  SpO2: 100% (15 Dec 2019 09:22) (98% - 100%)  CAPILLARY BLOOD GLUCOSE        I&O's Summary    14 Dec 2019 07:01  -  15 Dec 2019 07:00  --------------------------------------------------------  IN: 0 mL / OUT: 400 mL / NET: -400 mL        PHYSICAL EXAM:  GENERAL: NAD, well-developed  HEAD:  Atraumatic, Normocephalic  EYES: EOMI, PERRLA, conjunctiva and sclera clear  NECK: Supple, No JVD  CHEST/LUNG: Clear to auscultation bilaterally; No wheeze  HEART: Regular rate and rhythm; No murmurs, rubs, or gallops  ABDOMEN: Soft, Nontender, Nondistended; Bowel sounds present  EXTREMITIES:  2+ Peripheral Pulses, No clubbing, cyanosis, or edema  PSYCH: AAOx3  NEUROLOGY: non-focal  SKIN: No rashes or lesions    LABS:                        9.9    5.66  )-----------( 269      ( 15 Dec 2019 06:40 )             30.3     12-15    131<L>  |  103  |  25<H>  ----------------------------<  101<H>  4.2   |  18<L>  |  1.22    Ca    9.2      15 Dec 2019 06:40  Mg     1.8     12-14                RADIOLOGY & ADDITIONAL TESTS:    Imaging Personally Reviewed:    Consultant(s) Notes Reviewed:      Care Discussed with Consultants/Other Providers: ortho    ***History Limited to due to:   [ ] Dementia   [ ] Delirium   [ ] Condition    ***Delirium screen:   Patient knows the day of the week: [x ] YES [ ] NO  Patient can state the days of the week or months of the year backwards: [x ] YES [ ] NO    ***Function:   [ x] Independent  [ ] Assistance  [ ] Total care  [ ] Non-ambulatory    ***Living Situation/home resources:  [ ] Lives alone  [x ] Lives with family  [ ] Has a home health aide    ***Adavanced Directives:  [ ] DNR  [ ] No feeding tube  [ ] MOLST in chart  [ ] MOLST completed today   [ ] Unknown    ***Projected Discharge Plan:  [ ] Home  [ x] Rehab  [ ] SNF  [ ] Other living facility    ***Discussion:  [ ] Discharge plan discussed with patient and family

## 2019-12-15 NOTE — PROGRESS NOTE ADULT - PROBLEM SELECTOR PLAN 1
S/p IMN on 12/12/19.   pain controlled  encouraged incentive spirometry and PT.  awaiting acute rehab placement.

## 2019-12-15 NOTE — PROGRESS NOTE ADULT - SUBJECTIVE AND OBJECTIVE BOX
Pt seen and examined. No acute events overnight. Stable.    Vitals 24hrs  Vital Signs Last 24 Hrs  T(C): 36.9 (15 Dec 2019 05:21), Max: 36.9 (14 Dec 2019 15:01)  T(F): 98.4 (15 Dec 2019 05:21), Max: 98.4 (14 Dec 2019 15:01)  HR: 62 (15 Dec 2019 05:21) (58 - 62)  BP: 153/57 (15 Dec 2019 05:21) (128/56 - 153/57)  BP(mean): --  RR: 16 (15 Dec 2019 05:21) (16 - 16)  SpO2: 98% (15 Dec 2019 05:21) (98% - 100%)      12-13-19 @ 07:01  -  12-14-19 @ 07:00  --------------------------------------------------------  IN: 0 mL / OUT: 150 mL / NET: -150 mL    12-14-19 @ 07:01  -  12-15-19 @ 06:53  --------------------------------------------------------  IN: 0 mL / OUT: 400 mL / NET: -400 mL        Lab Results 24hrs:                        9.9    5.66  )-----------( 269      ( 15 Dec 2019 06:40 )             30.3     12-14    135  |  108<H>  |  20  ----------------------------<  92  4.2   |  16<L>  |  1.07    Ca    9.0      14 Dec 2019 06:45  Mg     1.8     12-14            PE:  NAD  LLE hip dressing C/D/I  Compartments soft and compressible  EHL/FHL/TA/GS intact  SILT SP/DP/T/S/S  WWP brisk cap refill         89yFemale s/p L hip IMN  - WBAT  - PT/OOB  - IS  - Pain control  - DVT ppx  - Dispo planning: acute rehab

## 2019-12-16 ENCOUNTER — TRANSCRIPTION ENCOUNTER (OUTPATIENT)
Age: 84
End: 2019-12-16

## 2019-12-16 VITALS
TEMPERATURE: 98 F | RESPIRATION RATE: 16 BRPM | DIASTOLIC BLOOD PRESSURE: 65 MMHG | HEART RATE: 66 BPM | OXYGEN SATURATION: 100 % | SYSTOLIC BLOOD PRESSURE: 147 MMHG

## 2019-12-16 DIAGNOSIS — Z29.9 ENCOUNTER FOR PROPHYLACTIC MEASURES, UNSPECIFIED: ICD-10-CM

## 2019-12-16 DIAGNOSIS — E87.1 HYPO-OSMOLALITY AND HYPONATREMIA: ICD-10-CM

## 2019-12-16 DIAGNOSIS — D62 ACUTE POSTHEMORRHAGIC ANEMIA: ICD-10-CM

## 2019-12-16 LAB
ANION GAP SERPL CALC-SCNC: 13 MMO/L — SIGNIFICANT CHANGE UP (ref 7–14)
BUN SERPL-MCNC: 26 MG/DL — HIGH (ref 7–23)
CALCIUM SERPL-MCNC: 9.4 MG/DL — SIGNIFICANT CHANGE UP (ref 8.4–10.5)
CHLORIDE SERPL-SCNC: 99 MMOL/L — SIGNIFICANT CHANGE UP (ref 98–107)
CO2 SERPL-SCNC: 17 MMOL/L — LOW (ref 22–31)
CREAT SERPL-MCNC: 1.29 MG/DL — SIGNIFICANT CHANGE UP (ref 0.5–1.3)
GLUCOSE SERPL-MCNC: 105 MG/DL — HIGH (ref 70–99)
HCT VFR BLD CALC: 30.8 % — LOW (ref 34.5–45)
HGB BLD-MCNC: 10.2 G/DL — LOW (ref 11.5–15.5)
MCHC RBC-ENTMCNC: 30 PG — SIGNIFICANT CHANGE UP (ref 27–34)
MCHC RBC-ENTMCNC: 33.1 % — SIGNIFICANT CHANGE UP (ref 32–36)
MCV RBC AUTO: 90.6 FL — SIGNIFICANT CHANGE UP (ref 80–100)
NRBC # FLD: 0 K/UL — SIGNIFICANT CHANGE UP (ref 0–0)
PLATELET # BLD AUTO: 353 K/UL — SIGNIFICANT CHANGE UP (ref 150–400)
PMV BLD: 10 FL — SIGNIFICANT CHANGE UP (ref 7–13)
POTASSIUM SERPL-MCNC: 3.9 MMOL/L — SIGNIFICANT CHANGE UP (ref 3.5–5.3)
POTASSIUM SERPL-SCNC: 3.9 MMOL/L — SIGNIFICANT CHANGE UP (ref 3.5–5.3)
RBC # BLD: 3.4 M/UL — LOW (ref 3.8–5.2)
RBC # FLD: 12.7 % — SIGNIFICANT CHANGE UP (ref 10.3–14.5)
SODIUM SERPL-SCNC: 129 MMOL/L — LOW (ref 135–145)
WBC # BLD: 5.7 K/UL — SIGNIFICANT CHANGE UP (ref 3.8–10.5)
WBC # FLD AUTO: 5.7 K/UL — SIGNIFICANT CHANGE UP (ref 3.8–10.5)

## 2019-12-16 PROCEDURE — 99233 SBSQ HOSP IP/OBS HIGH 50: CPT

## 2019-12-16 RX ADMIN — Medication 81 MILLIGRAM(S): at 06:04

## 2019-12-16 RX ADMIN — LISINOPRIL 40 MILLIGRAM(S): 2.5 TABLET ORAL at 06:04

## 2019-12-16 RX ADMIN — Medication 500 MILLIGRAM(S): at 12:32

## 2019-12-16 RX ADMIN — POLYETHYLENE GLYCOL 3350 17 GRAM(S): 17 POWDER, FOR SOLUTION ORAL at 12:32

## 2019-12-16 RX ADMIN — Medication 975 MILLIGRAM(S): at 14:57

## 2019-12-16 RX ADMIN — Medication 1 TABLET(S): at 12:32

## 2019-12-16 RX ADMIN — AMLODIPINE BESYLATE 10 MILLIGRAM(S): 2.5 TABLET ORAL at 06:04

## 2019-12-16 RX ADMIN — PANTOPRAZOLE SODIUM 40 MILLIGRAM(S): 20 TABLET, DELAYED RELEASE ORAL at 06:04

## 2019-12-16 RX ADMIN — Medication 975 MILLIGRAM(S): at 06:04

## 2019-12-16 NOTE — PROGRESS NOTE ADULT - ASSESSMENT
89F h/o osteoporosis, HTN p/w with left intertrochanteric fracture as a result of a mechanical fall, now s/p IMN on 12/12/19.

## 2019-12-16 NOTE — DISCHARGE NOTE NURSING/CASE MANAGEMENT/SOCIAL WORK - PATIENT PORTAL LINK FT
You can access the FollowMyHealth Patient Portal offered by [facility] by registering at the following website: [email or web address]. By joining [facility]’s FollowMyHealth portal, you will also be able to view your health information using other applications (apps) compatible with our system.

## 2019-12-16 NOTE — DISCHARGE NOTE NURSING/CASE MANAGEMENT/SOCIAL WORK - NSDPDISTO_GEN_ALL_CORE
Acute rehab/Surgical hip dressing clean, dry and intact.  +neurovascular status. Surgical leg elevated as instructed.  +void, Tolerated po and fluids.  Venodyne in use while in bed.  Call bell within reach at all times.  Proper usage of incentive spirometer with teach back.

## 2019-12-16 NOTE — PROGRESS NOTE ADULT - SUBJECTIVE AND OBJECTIVE BOX
Ortho       Patient is seen and examined at bedside.  No significant overnight events. Pain well controlled at moment.      Vital Signs Last 24 Hrs  T(C): 37.1 (15 Dec 2019 21:51), Max: 37.1 (15 Dec 2019 17:05)  T(F): 98.7 (15 Dec 2019 21:51), Max: 98.8 (15 Dec 2019 17:05)  HR: 73 (15 Dec 2019 21:51) (60 - 73)  BP: 156/55 (15 Dec 2019 21:51) (144/56 - 156/55)  BP(mean): --  RR: 16 (15 Dec 2019 21:51) (15 - 16)  SpO2: 99% (15 Dec 2019 21:51) (99% - 100%)    Gen: NAD    LLE: Dressing C/D/I.   Motor grossly intact distal + EHL/FHL/ TA/ GS. Sensation is grossly intact to light touch distal. Compartments are soft. Extremity  warm.      Labs:                        9.9    5.66  )-----------( 269      ( 15 Dec 2019 06:40 )             30.3     12-15    131<L>  |  103  |  25<H>  ----------------------------<  101<H>  4.2   |  18<L>  |  1.22    Ca    9.2      15 Dec 2019 06:40  Mg     1.8     12-14        A/P: Patient is a 89y y/o Female s/p left hip IM nail, POD # 4  - Pain control / Analgesia  - Inc Spirometry   - Venodynes  - DVT Prophylaxis - A 81 BID  - PT / OT  - WBAT / OOB  - rehab planning

## 2019-12-16 NOTE — DISCHARGE NOTE NURSING/CASE MANAGEMENT/SOCIAL WORK - NSDCPNINST_GEN_ALL_CORE
Call MD for any complain of swelling, numbness, tingling to leg, or if pain not relieved after taking pain medications redness or drainage to incision, return apointment and fever. Elevate surgical leg as instructed.   Take over the counter stool softener to prevent constipation that can be caused by taking pain medication.  Leave dressing on till you see doctor at office.

## 2019-12-16 NOTE — PROGRESS NOTE ADULT - PROVIDER SPECIALTY LIST ADULT
Anesthesia
Hospitalist
Orthopedics

## 2019-12-16 NOTE — PROGRESS NOTE ADULT - SUBJECTIVE AND OBJECTIVE BOX
CHIEF COMPLAINT: f/u left IMN repair    SUBJECTIVE / OVERNIGHT EVENTS: Patient seen and examined. No acute events overnight. Pain well controlled and patient without any complaints. Patient isn't drinking enough water she admits.    MEDICATIONS  (STANDING):  acetaminophen   Tablet .. 975 milliGRAM(s) Oral every 8 hours  amLODIPine   Tablet 10 milliGRAM(s) Oral daily  ascorbic acid 500 milliGRAM(s) Oral daily  aspirin enteric coated 81 milliGRAM(s) Oral two times a day  brimonidine 0.2%/ timolol 0.5% Ophthalmic Solution 1 Drop(s) Both EYES two times a day  calcium carbonate 1250 mG  + Vitamin D (OsCal 500 + D) 1 Tablet(s) Oral daily  influenza  Vaccine (HIGH DOSE) 0.5 milliLiter(s) IntraMuscular once  lisinopril 40 milliGRAM(s) Oral daily  pantoprazole    Tablet 40 milliGRAM(s) Oral before breakfast  polyethylene glycol 3350 17 Gram(s) Oral daily  senna 2 Tablet(s) Oral at bedtime  sodium chloride 0.9%. 1000 milliLiter(s) (75 mL/Hr) IV Continuous <Continuous>    MEDICATIONS  (PRN):  magnesium hydroxide Suspension 30 milliLiter(s) Oral daily PRN Constipation  melatonin 3 milliGRAM(s) Oral at bedtime PRN Insomnia  oxyCODONE    IR 5 milliGRAM(s) Oral every 4 hours PRN Severe Pain (7 - 10)  oxyCODONE    IR 2.5 milliGRAM(s) Oral every 4 hours PRN Mild to Moderate pain    VITALS:  T(F): 98.7 (12-16-19 @ 09:34), Max: 98.8 (12-15-19 @ 17:05)  HR: 77 (12-16-19 @ 09:34) (64 - 77)  BP: 151/63 (12-16-19 @ 09:34) (145/57 - 156/55)  RR: 18 (12-16-19 @ 09:34) (16 - 18)  SpO2: 100% (12-16-19 @ 09:34)    PHYSICAL EXAM:  GENERAL: NAD, well-developed  HEENT: slightly dry mucous membranes  CHEST/LUNG: Clear to auscultation bilaterally; No wheeze  HEART: Regular rate and rhythm; No murmurs, rubs, or gallops  ABDOMEN: Soft, Nontender, Nondistended; Bowel sounds present  EXTREMITIES:  2+ Peripheral Pulses, No clubbing, cyanosis, or edema  PSYCH: AAOx3  SKIN: Dressing C/D/I    LABS:              10.2                 129  | 17   | 26           5.70  >-----------< 353     ------------------------< 105                   30.8                 3.9  | 99   | 1.29                                         Ca 9.4   Mg x     Ph x        [ ] Consultant(s) Notes Reviewed:  [x] Care Discussed with Consultants/Other Providers: Orthopedic PA - discussed disposition    ***History Limited to due to:   [ ] Dementia   [ ] Delirium   [ ] Condition    ***Delirium screen:   Patient knows the day of the week: [x ] YES [ ] NO  Patient can state the days of the week or months of the year backwards: [x ] YES [ ] NO    ***Function:   [ x] Independent  [ ] Assistance  [ ] Total care  [ ] Non-ambulatory    ***Living Situation/home resources:  [ ] Lives alone  [x ] Lives with family ( and grand-daughter and great-grand children)  [ ] Has a home health aide    ***Adavanced Directives:  [ ] DNR  [ ] No feeding tube  [ ] MOLST in chart  [ ] MOLST completed today   [ ] Unknown    ***Projected Discharge Plan:  [ ] Home  [x] Rehab  [ ] SNF  [ ] Other living facility    ***Discussion:  [x] Discharge plan discussed with patient and family

## 2019-12-16 NOTE — PROGRESS NOTE ADULT - REASON FOR ADMISSION
Left Hip Intertrochanteric Fracture

## 2019-12-26 PROBLEM — I10 ESSENTIAL (PRIMARY) HYPERTENSION: Chronic | Status: ACTIVE | Noted: 2019-12-11

## 2019-12-27 PROBLEM — Z00.00 ENCOUNTER FOR PREVENTIVE HEALTH EXAMINATION: Status: ACTIVE | Noted: 2019-12-27

## 2019-12-30 ENCOUNTER — APPOINTMENT (OUTPATIENT)
Dept: ORTHOPEDIC SURGERY | Facility: CLINIC | Age: 84
End: 2019-12-30
Payer: MEDICARE

## 2019-12-30 PROCEDURE — 99024 POSTOP FOLLOW-UP VISIT: CPT

## 2019-12-30 PROCEDURE — 73521 X-RAY EXAM HIPS BI 2 VIEWS: CPT

## 2019-12-30 NOTE — HISTORY OF PRESENT ILLNESS
[4] : the patient reports pain that is 4/10 in severity [___ Weeks Post Op] : [unfilled] weeks post op [Fever] : fever [Clean/Dry/Intact] : clean, dry and intact [Healed] : healed [Chills] : no chills [Constipation] : no constipation [Nausea] : no nausea [Dysuria] : no dysuria [Diarrhea] : no diarrhea [Erythema] : not erythematous [Vomiting] : no vomiting [Discharge] : absent of discharge [Swelling] : not swollen [Dehiscence] : not dehisced [de-identified] : Pt returns for follow up for her left hip surgery. Pt is taking tylenol for pain. Pt in a wheelchair.  Pt is in [facility]. Pt can stand with a walker. Pt  is unsure if she is taking ASA. Pt has staples to the left hip intact, pt is receiving physical therapy daily. [de-identified] : Physical examination of the left hip discloses incision sites well approximated, staples intact. No acute erythema swelling defects or deformities. Neurovascular status to the left lower extremity grossly intact.\par \par The patient is able to weight-bear to tolerance with support [de-identified] : Stable postop gamma nail fixation for intertrochanteric fracture of the left hip [de-identified] : x-rays taken of the left hip discloses maintain position of the intertrochanteric fracture site with maintained Integrity of the gamma nail fixation [de-identified] : Patient The nursing staff at her rehabilitation facility were advised On the patient's condition, With  instructions to maintain aspirin prophylaxis, weightbearing to tolerance with close supervision and support. She was requested to return in 4-6 weeks to check her progress including repeat x-rays of the left hip

## 2020-02-11 ENCOUNTER — APPOINTMENT (OUTPATIENT)
Dept: ORTHOPEDIC SURGERY | Facility: CLINIC | Age: 85
End: 2020-02-11
Payer: MEDICARE

## 2020-02-11 DIAGNOSIS — Z87.81 PERSONAL HISTORY OF (HEALED) TRAUMATIC FRACTURE: ICD-10-CM

## 2020-02-11 PROCEDURE — 99024 POSTOP FOLLOW-UP VISIT: CPT

## 2020-02-11 PROCEDURE — 73502 X-RAY EXAM HIP UNI 2-3 VIEWS: CPT | Mod: LT

## 2020-02-11 NOTE — HISTORY OF PRESENT ILLNESS
[___ Weeks Post Op] : [unfilled] weeks post op [4] : the patient reports pain that is 4/10 in severity [Clean/Dry/Intact] : clean, dry and intact [Diarrhea] : no diarrhea [Chills] : no chills [Constipation] : no constipation [Fever] : no fever [Dysuria] : no dysuria [Vomiting] : no vomiting [Healed] : not healed [Nausea] : no nausea [Erythema] : not erythematous [Swelling] : not swollen [Discharge] : absent of discharge [de-identified] : Pt returns for post op visit of her left Intertrochanteric fx.12/11/19. Pt is using a walker. She is taking motrin for pain.  Pt is having a delay in her home physical therapy,  evidently,she has not had physical thereapy for two weeks.  [Dehiscence] : not dehisced [de-identified] : Physical examination of the left hip discloses well healed incisions to the lateral thigh. Functional staple range of motion nontender. She is ambulating with the use of a unilateral cane in the right hand. [de-identified] : X-rays taken today of the left hip disclosed maintain integrity position of the gamma nail fixation with further signs of consolidation of the intertrochanteric hip fracture. [de-identified] : Stable postop ORIF left hip gamma nail [de-identified] : She'll continue physical therapy advancing to a uni lateral cane and will be reevaluated in 2 months check her progress including new x-rays of the left hip

## 2020-04-14 ENCOUNTER — APPOINTMENT (OUTPATIENT)
Dept: ORTHOPEDIC SURGERY | Facility: CLINIC | Age: 85
End: 2020-04-14

## 2020-10-12 ENCOUNTER — INPATIENT (INPATIENT)
Facility: HOSPITAL | Age: 85
LOS: 8 days | Discharge: NURSING, REHAB OR LONG-TERM CARE | End: 2020-10-21
Attending: INTERNAL MEDICINE | Admitting: INTERNAL MEDICINE
Payer: MEDICARE

## 2020-10-12 VITALS
RESPIRATION RATE: 18 BRPM | TEMPERATURE: 97 F | OXYGEN SATURATION: 96 % | SYSTOLIC BLOOD PRESSURE: 127 MMHG | HEART RATE: 86 BPM | DIASTOLIC BLOOD PRESSURE: 74 MMHG | HEIGHT: 62 IN

## 2020-10-12 DIAGNOSIS — M87.00 IDIOPATHIC ASEPTIC NECROSIS OF UNSPECIFIED BONE: ICD-10-CM

## 2020-10-12 DIAGNOSIS — E87.6 HYPOKALEMIA: ICD-10-CM

## 2020-10-12 DIAGNOSIS — R62.7 ADULT FAILURE TO THRIVE: ICD-10-CM

## 2020-10-12 DIAGNOSIS — Z98.890 OTHER SPECIFIED POSTPROCEDURAL STATES: Chronic | ICD-10-CM

## 2020-10-12 DIAGNOSIS — D64.9 ANEMIA, UNSPECIFIED: ICD-10-CM

## 2020-10-12 DIAGNOSIS — K92.2 GASTROINTESTINAL HEMORRHAGE, UNSPECIFIED: ICD-10-CM

## 2020-10-12 DIAGNOSIS — I10 ESSENTIAL (PRIMARY) HYPERTENSION: ICD-10-CM

## 2020-10-12 DIAGNOSIS — Z29.9 ENCOUNTER FOR PROPHYLACTIC MEASURES, UNSPECIFIED: ICD-10-CM

## 2020-10-12 LAB
ALBUMIN SERPL ELPH-MCNC: 2.8 G/DL — LOW (ref 3.3–5)
ALP SERPL-CCNC: 56 U/L — SIGNIFICANT CHANGE UP (ref 40–120)
ALT FLD-CCNC: 6 U/L — SIGNIFICANT CHANGE UP (ref 4–33)
ANION GAP SERPL CALC-SCNC: 11 MMO/L — SIGNIFICANT CHANGE UP (ref 7–14)
AST SERPL-CCNC: 13 U/L — SIGNIFICANT CHANGE UP (ref 4–32)
BASOPHILS # BLD AUTO: 0.01 K/UL — SIGNIFICANT CHANGE UP (ref 0–0.2)
BASOPHILS NFR BLD AUTO: 0.1 % — SIGNIFICANT CHANGE UP (ref 0–2)
BILIRUB SERPL-MCNC: < 0.2 MG/DL — LOW (ref 0.2–1.2)
BLD GP AB SCN SERPL QL: NEGATIVE — SIGNIFICANT CHANGE UP
BUN SERPL-MCNC: 60 MG/DL — HIGH (ref 7–23)
CALCIUM SERPL-MCNC: 8.9 MG/DL — SIGNIFICANT CHANGE UP (ref 8.4–10.5)
CHLORIDE SERPL-SCNC: 104 MMOL/L — SIGNIFICANT CHANGE UP (ref 98–107)
CK SERPL-CCNC: 57 U/L — SIGNIFICANT CHANGE UP (ref 25–170)
CO2 SERPL-SCNC: 21 MMOL/L — LOW (ref 22–31)
CREAT SERPL-MCNC: 1.19 MG/DL — SIGNIFICANT CHANGE UP (ref 0.5–1.3)
CRP SERPL-MCNC: 91.9 MG/L — HIGH
EOSINOPHIL # BLD AUTO: 0.11 K/UL — SIGNIFICANT CHANGE UP (ref 0–0.5)
EOSINOPHIL NFR BLD AUTO: 1.6 % — SIGNIFICANT CHANGE UP (ref 0–6)
GLUCOSE SERPL-MCNC: 115 MG/DL — HIGH (ref 70–99)
HCT VFR BLD CALC: 21.3 % — LOW (ref 34.5–45)
HGB BLD-MCNC: 7.1 G/DL — LOW (ref 11.5–15.5)
IMM GRANULOCYTES NFR BLD AUTO: 0.4 % — SIGNIFICANT CHANGE UP (ref 0–1.5)
LYMPHOCYTES # BLD AUTO: 0.33 K/UL — LOW (ref 1–3.3)
LYMPHOCYTES # BLD AUTO: 4.9 % — LOW (ref 13–44)
MAGNESIUM SERPL-MCNC: 2.4 MG/DL — SIGNIFICANT CHANGE UP (ref 1.6–2.6)
MCHC RBC-ENTMCNC: 28.7 PG — SIGNIFICANT CHANGE UP (ref 27–34)
MCHC RBC-ENTMCNC: 33.3 % — SIGNIFICANT CHANGE UP (ref 32–36)
MCV RBC AUTO: 86.2 FL — SIGNIFICANT CHANGE UP (ref 80–100)
MONOCYTES # BLD AUTO: 0.24 K/UL — SIGNIFICANT CHANGE UP (ref 0–0.9)
MONOCYTES NFR BLD AUTO: 3.6 % — SIGNIFICANT CHANGE UP (ref 2–14)
NEUTROPHILS # BLD AUTO: 6.04 K/UL — SIGNIFICANT CHANGE UP (ref 1.8–7.4)
NEUTROPHILS NFR BLD AUTO: 89.4 % — HIGH (ref 43–77)
NRBC # FLD: 0 K/UL — SIGNIFICANT CHANGE UP (ref 0–0)
NT-PROBNP SERPL-SCNC: 978.7 PG/ML — SIGNIFICANT CHANGE UP
OB PNL STL: POSITIVE — SIGNIFICANT CHANGE UP
PLATELET # BLD AUTO: 620 K/UL — HIGH (ref 150–400)
PMV BLD: 8.9 FL — SIGNIFICANT CHANGE UP (ref 7–13)
POTASSIUM SERPL-MCNC: 2.4 MMOL/L — CRITICAL LOW (ref 3.5–5.3)
POTASSIUM SERPL-SCNC: 2.4 MMOL/L — CRITICAL LOW (ref 3.5–5.3)
PROT SERPL-MCNC: 6.9 G/DL — SIGNIFICANT CHANGE UP (ref 6–8.3)
RBC # BLD: 2.47 M/UL — LOW (ref 3.8–5.2)
RBC # FLD: 12.1 % — SIGNIFICANT CHANGE UP (ref 10.3–14.5)
RETICS #: 25 K/UL — SIGNIFICANT CHANGE UP (ref 25–125)
RETICS/RBC NFR: 1 % — SIGNIFICANT CHANGE UP (ref 0.5–2.5)
RH IG SCN BLD-IMP: POSITIVE — SIGNIFICANT CHANGE UP
RH IG SCN BLD-IMP: POSITIVE — SIGNIFICANT CHANGE UP
SARS-COV-2 RNA SPEC QL NAA+PROBE: SIGNIFICANT CHANGE UP
SODIUM SERPL-SCNC: 136 MMOL/L — SIGNIFICANT CHANGE UP (ref 135–145)
TROPONIN T, HIGH SENSITIVITY: 50 NG/L — SIGNIFICANT CHANGE UP (ref ?–14)
TSH SERPL-MCNC: 1.29 UIU/ML — SIGNIFICANT CHANGE UP (ref 0.27–4.2)
WBC # BLD: 6.76 K/UL — SIGNIFICANT CHANGE UP (ref 3.8–10.5)
WBC # FLD AUTO: 6.76 K/UL — SIGNIFICANT CHANGE UP (ref 3.8–10.5)

## 2020-10-12 PROCEDURE — 70450 CT HEAD/BRAIN W/O DYE: CPT | Mod: 26

## 2020-10-12 PROCEDURE — 73502 X-RAY EXAM HIP UNI 2-3 VIEWS: CPT | Mod: 26,LT

## 2020-10-12 PROCEDURE — 72192 CT PELVIS W/O DYE: CPT | Mod: 26

## 2020-10-12 PROCEDURE — 99285 EMERGENCY DEPT VISIT HI MDM: CPT

## 2020-10-12 PROCEDURE — 99223 1ST HOSP IP/OBS HIGH 75: CPT | Mod: GC

## 2020-10-12 PROCEDURE — 73552 X-RAY EXAM OF FEMUR 2/>: CPT | Mod: 26,LT

## 2020-10-12 RX ORDER — INFLUENZA VIRUS VACCINE 15; 15; 15; 15 UG/.5ML; UG/.5ML; UG/.5ML; UG/.5ML
0.5 SUSPENSION INTRAMUSCULAR ONCE
Refills: 0 | Status: DISCONTINUED | OUTPATIENT
Start: 2020-10-12 | End: 2020-10-21

## 2020-10-12 RX ORDER — ACETAMINOPHEN 500 MG
650 TABLET ORAL ONCE
Refills: 0 | Status: COMPLETED | OUTPATIENT
Start: 2020-10-12 | End: 2020-10-12

## 2020-10-12 RX ORDER — POTASSIUM CHLORIDE 20 MEQ
10 PACKET (EA) ORAL
Refills: 0 | Status: COMPLETED | OUTPATIENT
Start: 2020-10-12 | End: 2020-10-12

## 2020-10-12 RX ORDER — AMLODIPINE BESYLATE 2.5 MG/1
10 TABLET ORAL DAILY
Refills: 0 | Status: DISCONTINUED | OUTPATIENT
Start: 2020-10-13 | End: 2020-10-21

## 2020-10-12 RX ORDER — LISINOPRIL 2.5 MG/1
1 TABLET ORAL
Qty: 0 | Refills: 0 | DISCHARGE

## 2020-10-12 RX ORDER — BRIMONIDINE TARTRATE, TIMOLOL MALEATE 2; 5 MG/ML; MG/ML
1 SOLUTION/ DROPS OPHTHALMIC
Qty: 0 | Refills: 0 | DISCHARGE

## 2020-10-12 RX ORDER — LISINOPRIL 2.5 MG/1
40 TABLET ORAL DAILY
Refills: 0 | Status: DISCONTINUED | OUTPATIENT
Start: 2020-10-13 | End: 2020-10-21

## 2020-10-12 RX ORDER — PANTOPRAZOLE SODIUM 20 MG/1
40 TABLET, DELAYED RELEASE ORAL
Refills: 0 | Status: DISCONTINUED | OUTPATIENT
Start: 2020-10-12 | End: 2020-10-13

## 2020-10-12 RX ORDER — AMLODIPINE BESYLATE 2.5 MG/1
1 TABLET ORAL
Qty: 0 | Refills: 0 | DISCHARGE

## 2020-10-12 RX ORDER — POTASSIUM CHLORIDE 20 MEQ
40 PACKET (EA) ORAL ONCE
Refills: 0 | Status: COMPLETED | OUTPATIENT
Start: 2020-10-12 | End: 2020-10-12

## 2020-10-12 RX ORDER — PANTOPRAZOLE SODIUM 20 MG/1
80 TABLET, DELAYED RELEASE ORAL ONCE
Refills: 0 | Status: COMPLETED | OUTPATIENT
Start: 2020-10-12 | End: 2020-10-12

## 2020-10-12 RX ORDER — TIMOLOL 0.5 %
1 DROPS OPHTHALMIC (EYE)
Refills: 0 | Status: DISCONTINUED | OUTPATIENT
Start: 2020-10-12 | End: 2020-10-21

## 2020-10-12 RX ORDER — ACETAMINOPHEN 500 MG
650 TABLET ORAL EVERY 6 HOURS
Refills: 0 | Status: DISCONTINUED | OUTPATIENT
Start: 2020-10-12 | End: 2020-10-21

## 2020-10-12 RX ORDER — KETOROLAC TROMETHAMINE 30 MG/ML
15 SYRINGE (ML) INJECTION ONCE
Refills: 0 | Status: DISCONTINUED | OUTPATIENT
Start: 2020-10-12 | End: 2020-10-12

## 2020-10-12 RX ORDER — BRIMONIDINE TARTRATE 2 MG/MG
1 SOLUTION/ DROPS OPHTHALMIC
Refills: 0 | Status: DISCONTINUED | OUTPATIENT
Start: 2020-10-12 | End: 2020-10-21

## 2020-10-12 RX ADMIN — Medication 40 MILLIEQUIVALENT(S): at 16:21

## 2020-10-12 RX ADMIN — Medication 15 MILLIGRAM(S): at 17:16

## 2020-10-12 RX ADMIN — Medication 15 MILLIGRAM(S): at 14:50

## 2020-10-12 RX ADMIN — Medication 100 MILLIEQUIVALENT(S): at 17:52

## 2020-10-12 RX ADMIN — Medication 100 MILLIEQUIVALENT(S): at 16:21

## 2020-10-12 RX ADMIN — Medication 40 MILLIEQUIVALENT(S): at 18:58

## 2020-10-12 RX ADMIN — Medication 10 MILLIEQUIVALENT(S): at 17:50

## 2020-10-12 RX ADMIN — PANTOPRAZOLE SODIUM 80 MILLIGRAM(S): 20 TABLET, DELAYED RELEASE ORAL at 18:08

## 2020-10-12 RX ADMIN — Medication 650 MILLIGRAM(S): at 17:16

## 2020-10-12 RX ADMIN — Medication 100 MILLIEQUIVALENT(S): at 18:59

## 2020-10-12 RX ADMIN — Medication 650 MILLIGRAM(S): at 14:50

## 2020-10-12 NOTE — ED PROVIDER NOTE - ATTENDING CONTRIBUTION TO CARE
Attending Attestation: Dr. Rodriguez  I have personally performed a history and physical examination of the patient and discussed management with the resident as well as the patient.  I reviewed the resident's note and agree with the documented findings and plan of care.  I have authored and modified critical sections of the Provider Note, including but not limited to HPI, Physical Exam and MDM. Pt p/w hip pain limiting her ability to walk. Also endorses generalized weakness, poor po intake, loss of taste and smell, occasional dry cough but no fevers or other infectiou ssigns.  No known COVID contacts.  Will isolate, provide IVF, send labs, test for COVID, assess for fractures and likely admit given weakness and inability to ambulate.

## 2020-10-12 NOTE — ED ADULT NURSE REASSESSMENT NOTE - NS ED NURSE REASSESS COMMENT FT1
Pt resting comfortably at this time. Denies any c/o pain after being medicated earlier. VSS. Pt able to tolerate PO fluids. Awaiting imaging and further plan of care. no acute distress.

## 2020-10-12 NOTE — H&P ADULT - ATTENDING COMMENTS
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 Also with anemia and FOBT+stool following regular NSAID use. Starting PPI and obtaining GI c/s. Will trend CBC, transfuse 1U pRBC.     I examined this patient and discussed their management with house staff on 10/12/2020.

## 2020-10-12 NOTE — H&P ADULT - PROBLEM SELECTOR PLAN 6
VTE ppx: mechanical SCD only due to GIB  Diet: DASH/TLC with Ensure Enlive - appreciate dietician recs  pending PT

## 2020-10-12 NOTE — H&P ADULT - NSICDXPASTSURGICALHX_GEN_ALL_CORE_FT
PAST SURGICAL HISTORY:  No significant past surgical history      PAST SURGICAL HISTORY:  History of hip surgery

## 2020-10-12 NOTE — ED ADULT NURSE NOTE - OBJECTIVE STATEMENT
patient alert ox3 came in c/o weakness , pain to left hip and loss of appetite and weight loss since few months. breathing even and unlabored. skin warm and d ry. c/o left hip pain. h/o FX hip with steel adonay placement in December . ambulates with roller walker/cane. labs done and meds given as ordered. awaiting results and re eval.

## 2020-10-12 NOTE — H&P ADULT - NSHPREVIEWOFSYSTEMS_GEN_ALL_CORE
REVIEW OF SYSTEMS:    CONSTITUTIONAL: +weakness and chills No fevers   EYES: no blurry vision or eye pain.   ENT: No throat pain. No dysphagia.    NECK: No pain or stiffness  RESPIRATORY: No cough, wheezing, hemoptysis; No shortness of breath  CARDIOVASCULAR: No chest pain or palpitations.  GASTROINTESTINAL: +"dark stool" and decreased appetite No abdominal pain. No nausea or vomiting; No diarrhea or constipation. No hematochezia.  GENITOURINARY: No dysuria, frequency or hematuria  NEUROLOGICAL: +weakness and falls No numbness No dizziness   SKIN: No itching, burning, rashes, or lesions.   LYMPHATIC: No masses or swelling.   All other review of systems is negative unless indicated above. REVIEW OF SYSTEMS:    CONSTITUTIONAL: +weakness and chills. No fevers   EYES: no blurry vision or eye pain.   ENT: No throat pain. No dysphagia.    NECK: No pain or stiffness  RESPIRATORY: No cough, wheezing, hemoptysis; No shortness of breath  CARDIOVASCULAR: No chest pain or palpitations.  GASTROINTESTINAL: +"dark stool" and decreased appetite No abdominal pain. No nausea or vomiting; No diarrhea or constipation. No hematochezia.  GENITOURINARY: No dysuria, frequency or hematuria  NEUROLOGICAL: +weakness, falls, numbness No dizziness   SKIN: No itching, burning, rashes, or lesions.   LYMPHATIC: No masses or swelling.   All other review of systems is negative unless indicated above.

## 2020-10-12 NOTE — H&P ADULT - NSHPLABSRESULTS_GEN_ALL_CORE
LABS:  10-12 @ 14:30 Creatine 57 u/L [25 - 170]  cret                        7.1    6.76  )-----------( 620      ( 12 Oct 2020 14:30 )             21.3     10-12    136  |  104  |  60<H>  ----------------------------<  115<H>  2.4<LL>   |  21<L>  |  1.19    Ca    8.9      12 Oct 2020 14:30  Mg     2.4     10-12    TPro  6.9  /  Alb  2.8<L>  /  TBili  < 0.2<L>  /  DBili  x   /  AST  13  /  ALT  6   /  AlkPhos  56  10-12      CARDIAC MARKERS ( 12 Oct 2020 14:30 )  x     / x     / 57 u/L / x     / x

## 2020-10-12 NOTE — ED PROVIDER NOTE - CARE PLAN
Principal Discharge DX:	Anemia  Secondary Diagnosis:	Hypokalemia  Secondary Diagnosis:	Loss of taste  Secondary Diagnosis:	Fatigue

## 2020-10-12 NOTE — ED PROVIDER NOTE - CLINICAL SUMMARY MEDICAL DECISION MAKING FREE TEXT BOX
no acute fracture, no ich, has hypokalemia repleting agressively, treat for possible GI bleed, rule out covid Pt p/w hip pain limiting her ability to walk. Also endorses generalized weakness, poor po intake, loss of taste and smell, occasional dry cough but no fevers or other infectiou ssigns.  No known COVID contacts.  Will isolate, provide IVF, send labs, test for COVID, assess for fractures and likely admit given weakness and inability to ambulate.

## 2020-10-12 NOTE — H&P ADULT - NSHPPHYSICALEXAM_GEN_ALL_CORE
T(C): 36.2 (10-12-20 @ 15:40), Max: 36.3 (10-12-20 @ 13:33)  HR: 59 (10-12-20 @ 15:40) (59 - 86)  BP: 130/58 (10-12-20 @ 15:40) (127/74 - 160/64)  RR: 12 (10-12-20 @ 15:40) (12 - 18)  SpO2: 97% (10-12-20 @ 15:40) (96% - 97%) T(C): 36.2 (10-12-20 @ 15:40), Max: 36.3 (10-12-20 @ 13:33)  HR: 59 (10-12-20 @ 15:40) (59 - 86)  BP: 130/58 (10-12-20 @ 15:40) (127/74 - 160/64)  RR: 12 (10-12-20 @ 15:40) (12 - 18)  SpO2: 97% (10-12-20 @ 15:40) (96% - 97%)    T(C): 36.4 (10-13-20 @ 05:50), Max: 36.6 (10-12-20 @ 23:17)  HR: 62 (10-13-20 @ 05:50) (59 - 86)  BP: 137/66 (10-13-20 @ 05:50) (127/74 - 160/64)  RR: 16 (10-13-20 @ 05:50) (12 - 18)  SpO2: 100% (10-13-20 @ 05:50) (96% - 100%)    PHYSICAL EXAM:  GENERAL: NAD, well-developed  HEAD:  Atraumatic, Normocephalic  EYES: EOMI, PERRLA, conjunctiva and sclera clear  NECK: Supple, No elevated JVD  CHEST/LUNG: Clear to auscultation bilaterally; No wheeze  HEART: Regular rate and rhythm; No murmurs, rubs, or gallops  ABDOMEN: Soft, Nontender, Nondistended; Bowel sounds present  EXTREMITIES:  2+ Peripheral Pulses, No clubbing, cyanosis, or edema  PSYCH: AAOx3  NEUROLOGY: CN II-XII grossly intact, moving all extremities  SKIN: No rashes or lesions

## 2020-10-12 NOTE — H&P ADULT - PROBLEM SELECTOR PLAN 1
h/o left intertrochanteric fracture s/p left femur intramedullary nail done on 12/12/2019. 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 Associated numbness and weakness on LLE  - appreciate ortho recs  - pain mgmt with Tylenol, avoid NSAIDs due to GIB  - pending PT eval h/o left intertrochanteric fracture s/p left femur intramedullary nail done on 12/12/2019. 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 Associated numbness and weakness on LLE  -appreciate ortho recs  -pain mgmt with Tylenol, avoid NSAIDs due to GIB  -pending PT eval

## 2020-10-12 NOTE — H&P ADULT - ASSESSMENT
89y/o F with h/o osteoporosis, HTN and left intertrochanteric fracture s/p IMN on 12/12/19 p/w fall/FTT found to have avascular necrosis of the hip and GIB

## 2020-10-12 NOTE — ED PROVIDER NOTE - OBJECTIVE STATEMENT
89 y F PMH of L IT fracture sp nail in dec 2019, htn  pw left hip pain  has had multiple falls last one was 1 week ago  did hit head, but no LOC, no anticoagulation  patient able to ambulate but with pain  walked yesterday  also has no sense of taste  and has decreased appetite as result 89 y F PMH of L IT fracture sp nail in dec 2019, htn p/w left hip pain. Has had multiple falls last one was 1 week ago. Did hit head, but no LOC, no anticoagulation. She is able to ambulate but with pain, walked yesterday but is here now bc pain is getting worse.  Also states he has no sense of taste and has decreased appetite as result. Feels weak.  States she's been losing weight. Thin appearing.  No fever/chills.  No N/V/D.  Endorses occasional mild cough, no known sick contacts.

## 2020-10-12 NOTE — H&P ADULT - NSHPSOCIALHISTORY_GEN_ALL_CORE
Pt lives with her  and grandchildren who help care for her Pt lives with her  as well as her grandchildren who help care for her Pt lives with her  as well as her grandchildren who help care for her. Denies tobacco, alcohol, and illicit drug use. Previously worked as , retired at 61yo.

## 2020-10-12 NOTE — H&P ADULT - PROBLEM SELECTOR PLAN 2
endorses epigastric pain, poor appetite, heavy NSAIDs use for L hip pain, occult blood  -avoid further NSAID use  -continue pantoprazole  -maintain type and screen  -monitor daily CBC  -consider GI recs if Sx worsen endorses epigastric pain, poor appetite, heavy NSAIDs use for L hip pain, occult blood. baseline hgb 9-10 on last admission  -appreciate GI recs   -avoid further NSAID use  -continue pantoprazole IV  -maintain type and screen  -monitor daily CBC  -pending iron studies

## 2020-10-12 NOTE — H&P ADULT - PROBLEM SELECTOR PLAN 4
continue home meds amlodipine and lisinopril in [facility] BP, continue to monitor  -continue home meds amlodipine and lisinopril

## 2020-10-12 NOTE — ED ADULT NURSE NOTE - NSIMPLEMENTINTERV_GEN_ALL_ED
Implemented All Fall with Harm Risk Interventions:  [place] to call system. Call bell, personal items and telephone within reach. Instruct patient to call for assistance. Room bathroom lighting operational. Non-slip footwear when patient is off stretcher. Physically safe environment: no spills, clutter or unnecessary equipment. Stretcher in lowest position, wheels locked, appropriate side rails in place. Provide visual cue, wrist band, yellow gown, etc. Monitor gait and stability. Monitor for mental status changes and reorient to person, place, and time. Review medications for side effects contributing to fall risk. Reinforce activity limits and safety measures with patient and family. Provide visual clues: red socks.

## 2020-10-12 NOTE — ED PROVIDER NOTE - PHYSICAL EXAMINATION
CONSTITUTIONAL: Non-toxic, non-diaphoretic, in no apparent distress  HEAD: Normocephalic; atruamatic  EYES: EOM intact   ENMT: External appears normal; normal oropharynx, moist  NECK: grossly normal active ROM,  CARD: No cyanosis, good peripheral perfusion, RRR, no MRG  RESP: Normal chest excursion with respiration; no increased work of breathing, CTAB  ABD: SNTND  EXT: moving all extremities, no gross disfigurement or asymmetry,  SKIN: Warm, dry, no rash  NEURO:  moving all extremities, no facial droop, no dysarthria      cn2-12 intact  no ttp on midline spine or bony prominences  ++pain on passive movement of hip,

## 2020-10-12 NOTE — H&P ADULT - PROBLEM SELECTOR PLAN 5
endorses poor appetite for past 2 months with loss of taste in recent weeks  -appreciate dietician recs

## 2020-10-13 LAB
ANION GAP SERPL CALC-SCNC: 11 MMO/L — SIGNIFICANT CHANGE UP (ref 7–14)
ANION GAP SERPL CALC-SCNC: 8 MMO/L — SIGNIFICANT CHANGE UP (ref 7–14)
APTT BLD: 28.4 SEC — SIGNIFICANT CHANGE UP (ref 27–36.3)
BUN SERPL-MCNC: 49 MG/DL — HIGH (ref 7–23)
BUN SERPL-MCNC: 55 MG/DL — HIGH (ref 7–23)
CALCIUM SERPL-MCNC: 8.9 MG/DL — SIGNIFICANT CHANGE UP (ref 8.4–10.5)
CALCIUM SERPL-MCNC: 9 MG/DL — SIGNIFICANT CHANGE UP (ref 8.4–10.5)
CHLORIDE SERPL-SCNC: 106 MMOL/L — SIGNIFICANT CHANGE UP (ref 98–107)
CHLORIDE SERPL-SCNC: 106 MMOL/L — SIGNIFICANT CHANGE UP (ref 98–107)
CO2 SERPL-SCNC: 19 MMOL/L — LOW (ref 22–31)
CO2 SERPL-SCNC: 21 MMOL/L — LOW (ref 22–31)
CREAT SERPL-MCNC: 0.89 MG/DL — SIGNIFICANT CHANGE UP (ref 0.5–1.3)
CREAT SERPL-MCNC: 1.05 MG/DL — SIGNIFICANT CHANGE UP (ref 0.5–1.3)
GLUCOSE SERPL-MCNC: 81 MG/DL — SIGNIFICANT CHANGE UP (ref 70–99)
GLUCOSE SERPL-MCNC: 86 MG/DL — SIGNIFICANT CHANGE UP (ref 70–99)
HCT VFR BLD CALC: 24.3 % — LOW (ref 34.5–45)
HCT VFR BLD CALC: 24.4 % — LOW (ref 34.5–45)
HGB BLD-MCNC: 7.8 G/DL — LOW (ref 11.5–15.5)
HGB BLD-MCNC: 8.1 G/DL — LOW (ref 11.5–15.5)
INR BLD: 1.2 — HIGH (ref 0.88–1.16)
MAGNESIUM SERPL-MCNC: 2.3 MG/DL — SIGNIFICANT CHANGE UP (ref 1.6–2.6)
MAGNESIUM SERPL-MCNC: 2.4 MG/DL — SIGNIFICANT CHANGE UP (ref 1.6–2.6)
MCHC RBC-ENTMCNC: 28.5 PG — SIGNIFICANT CHANGE UP (ref 27–34)
MCHC RBC-ENTMCNC: 28.8 PG — SIGNIFICANT CHANGE UP (ref 27–34)
MCHC RBC-ENTMCNC: 32.1 % — SIGNIFICANT CHANGE UP (ref 32–36)
MCHC RBC-ENTMCNC: 33.2 % — SIGNIFICANT CHANGE UP (ref 32–36)
MCV RBC AUTO: 86.8 FL — SIGNIFICANT CHANGE UP (ref 80–100)
MCV RBC AUTO: 88.7 FL — SIGNIFICANT CHANGE UP (ref 80–100)
NRBC # FLD: 0 K/UL — SIGNIFICANT CHANGE UP (ref 0–0)
NRBC # FLD: 0 K/UL — SIGNIFICANT CHANGE UP (ref 0–0)
PHOSPHATE SERPL-MCNC: 1.8 MG/DL — LOW (ref 2.5–4.5)
PHOSPHATE SERPL-MCNC: 2.4 MG/DL — LOW (ref 2.5–4.5)
PLATELET # BLD AUTO: 504 K/UL — HIGH (ref 150–400)
PLATELET # BLD AUTO: 518 K/UL — HIGH (ref 150–400)
PMV BLD: 8.8 FL — SIGNIFICANT CHANGE UP (ref 7–13)
PMV BLD: 9 FL — SIGNIFICANT CHANGE UP (ref 7–13)
POTASSIUM SERPL-MCNC: 3.8 MMOL/L — SIGNIFICANT CHANGE UP (ref 3.5–5.3)
POTASSIUM SERPL-MCNC: 4.1 MMOL/L — SIGNIFICANT CHANGE UP (ref 3.5–5.3)
POTASSIUM SERPL-SCNC: 3.8 MMOL/L — SIGNIFICANT CHANGE UP (ref 3.5–5.3)
POTASSIUM SERPL-SCNC: 4.1 MMOL/L — SIGNIFICANT CHANGE UP (ref 3.5–5.3)
PROTHROM AB SERPL-ACNC: 13.7 SEC — HIGH (ref 10.6–13.6)
RBC # BLD: 2.74 M/UL — LOW (ref 3.8–5.2)
RBC # BLD: 2.81 M/UL — LOW (ref 3.8–5.2)
RBC # FLD: 12.9 % — SIGNIFICANT CHANGE UP (ref 10.3–14.5)
RBC # FLD: 13.2 % — SIGNIFICANT CHANGE UP (ref 10.3–14.5)
SODIUM SERPL-SCNC: 135 MMOL/L — SIGNIFICANT CHANGE UP (ref 135–145)
SODIUM SERPL-SCNC: 136 MMOL/L — SIGNIFICANT CHANGE UP (ref 135–145)
TROPONIN T, HIGH SENSITIVITY: 43 NG/L — SIGNIFICANT CHANGE UP (ref ?–14)
WBC # BLD: 7.55 K/UL — SIGNIFICANT CHANGE UP (ref 3.8–10.5)
WBC # BLD: 7.6 K/UL — SIGNIFICANT CHANGE UP (ref 3.8–10.5)
WBC # FLD AUTO: 7.55 K/UL — SIGNIFICANT CHANGE UP (ref 3.8–10.5)
WBC # FLD AUTO: 7.6 K/UL — SIGNIFICANT CHANGE UP (ref 3.8–10.5)

## 2020-10-13 PROCEDURE — 43239 EGD BIOPSY SINGLE/MULTIPLE: CPT | Mod: GC

## 2020-10-13 PROCEDURE — 99233 SBSQ HOSP IP/OBS HIGH 50: CPT | Mod: GC

## 2020-10-13 PROCEDURE — 99223 1ST HOSP IP/OBS HIGH 75: CPT | Mod: GC,25

## 2020-10-13 RX ORDER — SUCRALFATE 1 G
1 TABLET ORAL
Refills: 0 | Status: DISCONTINUED | OUTPATIENT
Start: 2020-10-13 | End: 2020-10-21

## 2020-10-13 RX ORDER — PANTOPRAZOLE SODIUM 20 MG/1
8 TABLET, DELAYED RELEASE ORAL
Qty: 80 | Refills: 0 | Status: DISCONTINUED | OUTPATIENT
Start: 2020-10-13 | End: 2020-10-15

## 2020-10-13 RX ADMIN — PANTOPRAZOLE SODIUM 40 MILLIGRAM(S): 20 TABLET, DELAYED RELEASE ORAL at 05:57

## 2020-10-13 RX ADMIN — Medication 63.75 MILLIMOLE(S): at 05:50

## 2020-10-13 RX ADMIN — Medication 1 DROP(S): at 17:47

## 2020-10-13 RX ADMIN — BRIMONIDINE TARTRATE 1 DROP(S): 2 SOLUTION/ DROPS OPHTHALMIC at 05:57

## 2020-10-13 RX ADMIN — BRIMONIDINE TARTRATE 1 DROP(S): 2 SOLUTION/ DROPS OPHTHALMIC at 17:47

## 2020-10-13 RX ADMIN — PANTOPRAZOLE SODIUM 10 MG/HR: 20 TABLET, DELAYED RELEASE ORAL at 17:46

## 2020-10-13 RX ADMIN — Medication 1 GRAM(S): at 17:47

## 2020-10-13 RX ADMIN — AMLODIPINE BESYLATE 10 MILLIGRAM(S): 2.5 TABLET ORAL at 05:56

## 2020-10-13 RX ADMIN — Medication 1 DROP(S): at 05:59

## 2020-10-13 RX ADMIN — LISINOPRIL 40 MILLIGRAM(S): 2.5 TABLET ORAL at 05:56

## 2020-10-13 NOTE — CONSULT NOTE ADULT - SUBJECTIVE AND OBJECTIVE BOX
Ortho Progress Note  ALESSANDRA BEAVERS   MRN-[id number]    89yFemaljason is admitted for fall 1wk ago but has been able to ambulate w/cane though limited c/o L hip pain on movement.  Resting comfortably, NAD, ANO x 3->pt states surgery with Dr. Durán Dec of 2019 to L hip and saw Dr. Durná postop once in April, doing well since. Pt states fell a wk ago but not hard, denies numbness or tingling sensation.    Vital Signs Last 24 Hrs  T(C): 36.4 (13 Oct 2020 05:50), Max: 36.6 (12 Oct 2020 23:17)  T(F): 97.5 (13 Oct 2020 05:50), Max: 97.9 (12 Oct 2020 23:17)  HR: 62 (13 Oct 2020 05:50) (59 - 86)  BP: 137/66 (13 Oct 2020 05:50) (127/74 - 160/64)  BP(mean): --  RR: 16 (13 Oct 2020 05:50) (12 - 18)  SpO2: 100% (13 Oct 2020 05:50) (96% - 100%)  No Known Allergies      L hip incision well healed  +local palpable tenderness to L groin and L hip  motor no AROM or PROM to LLE due to pain->pain to L hip on heel strike, log roll of LLE, pelvic rock but no instability, and on PROM of L knee  otherwise BLE symmetrical 4+/5 EHL, TA, GS  sensation BLE intact to light touch                          8.1    7.60  )-----------( 518      ( 13 Oct 2020 07:40 )             24.4     10-13    136  |  106  |  49<H>  ----------------------------<  81  3.8   |  19<L>  |  0.89    Ca    9.0      13 Oct 2020 07:40  Phos  2.4     10-13  Mg     2.3     10-13    TPro  6.9  /  Alb  2.8<L>  /  TBili  < 0.2<L>  /  DBili  x   /  AST  13  /  ALT  6   /  AlkPhos  56  10-12    PT/INR - ( 13 Oct 2020 07:40 )   PT: 13.7 SEC;   INR: 1.20     PTT - ( 13 Oct 2020 07:40 )  PTT:28.4 SEC        < from: Xray Femur 2 Views, Left (10.12.20 @ 15:40) >  EXAM:  XR HIP 1V LT      EXAM:  XR FEMUR 2 VIEWS LT      EXAM:  RAD PELVIS 2 VIEWS        PROCEDURE DATE:  Oct 12 2020   INTERPRETATION:  CLINICAL INDICATION: left hip pain    EXAM:  AP pelvis with AP and frog-lateral left hip and femur from 10/12/2020 at 1540. Compared to fluoroscopic spot images from 12/12/2019.    IMPRESSION:  Redemonstrated short proximal left femoral IM adonay/nail with compression screw and distal interlocking screw. Left femoral head migrated and displaced from the compression screw appearing rotated counter clockwise medially. Tip of the compression screw eroded through the left acetabular roof. No dislocations or acute appearing fractures.    Intact pelvic and obturator rings and symmetrically aligned spaced SI joints and pubic symphysis. Advanced lower lumbar spine degenerative change. Preserved right hip joint space. Advanced left knee medial tibiofemoral compartment osteoarthritic change.    Rounded lamellated calcific density focus projects through distal femoral metaphysis, possibly a superimposed suprapatellar degenerative osseous body.    Generalized osteopenia otherwise no discrete suspicious lytic or blastic lesions.    Vascular calcifications.    < end of copied text >        < from: CT Pelvis Bony Only No Cont (10.12.20 @ 17:38) >  EXAM:  CT PELVIS BONY ONLY      PROCEDURE DATE:  Oct 12 2020   INTERPRETATION:  CT OF THE BONY PELVIS WITHOUT CONTRAST.    CLINICAL INFORMATION: Pelvic pain. Evaluate for fracture. History of prior intramedullary nail.  TECHNIQUE: Axial CT images were obtained of the bony pelvis with coronal and sagittal reconstructions. No contrast was administered.    FINDINGS: The bones are demineralized.    The patient is status post prior ORIF of the left hip. The femoral neck screw has protruded beyond the confines of the femoral head across the joint into the acetabulum with erosive change and osseous resorption around the screw within the acetabulum. There is curvilinear lucency and surrounding sclerosis within the femoral head consistent with underlying avascular necrosis. There is lucency within the femoral head and neck surrounding the screw compatible with screw loosening/osseous resorptive change. A small portion of the previously noted intertrochanteric fracture line is still visualized. However there is bony bridging across the majority of the fracture site.    There is no right hip fracture identified.    There is moderate degenerative change of the pubic symphysis. Lower lumbar facet arthrosis and degenerative disc disease is present.    Axial calcification is present.    IMPRESSION:    Migration of the femoral neck screw within the left hip beyond the confines of the femoral head and across the joint space into the acetabulum with adjacent erosive and osseous resorptivechange. Underlying avascular necrosis of the left femoral head.    No acute fracture.    < end of copied text >      A/P Ortho Stable L hip pain s/p fall  continue pain management per primary team - limited regimen due to patient for EGD today for r/o GIB  NWB to LLE until further assessment by Ortho resident/attd  patient to be seen by Ortho resident  will d/w Dr. Durán   ALESSANDRA BEAVERS   MRN-[id number]    89yFemale who over the past several months has had decreased energy, PO intake. She also suffered a fall 1 week agohas been able to ambulate w/cane though limited c/o L hip pain on movement.  She has a hx of left hip fx s/p IMN with Dr. Durán in December, 2019. Denies numbness/tingling.    Vital Signs Last 24 Hrs  T(C): 36.4 (13 Oct 2020 05:50), Max: 36.6 (12 Oct 2020 23:17)  T(F): 97.5 (13 Oct 2020 05:50), Max: 97.9 (12 Oct 2020 23:17)  HR: 62 (13 Oct 2020 05:50) (59 - 86)  BP: 137/66 (13 Oct 2020 05:50) (127/74 - 160/64)  BP(mean): --  RR: 16 (13 Oct 2020 05:50) (12 - 18)  SpO2: 100% (13 Oct 2020 05:50) (96% - 100%)  No Known Allergies      L hip incision well healed  +local palpable tenderness to L groin and L hip  - pain with left hip pROM  - negative log roll/heel strike  otherwise BLE symmetrical 4+/5 EHL, TA, GS  sensation BLE intact to light touch                          8.1    7.60  )-----------( 518      ( 13 Oct 2020 07:40 )             24.4     10-13    136  |  106  |  49<H>  ----------------------------<  81  3.8   |  19<L>  |  0.89    Ca    9.0      13 Oct 2020 07:40  Phos  2.4     10-13  Mg     2.3     10-13    TPro  6.9  /  Alb  2.8<L>  /  TBili  < 0.2<L>  /  DBili  x   /  AST  13  /  ALT  6   /  AlkPhos  56  10-12    PT/INR - ( 13 Oct 2020 07:40 )   PT: 13.7 SEC;   INR: 1.20     PTT - ( 13 Oct 2020 07:40 )  PTT:28.4 SEC        < from: Xray Femur 2 Views, Left (10.12.20 @ 15:40) >  EXAM:  XR HIP 1V LT      EXAM:  XR FEMUR 2 VIEWS LT      EXAM:  RAD PELVIS 2 VIEWS        PROCEDURE DATE:  Oct 12 2020   INTERPRETATION:  CLINICAL INDICATION: left hip pain    EXAM:  AP pelvis with AP and frog-lateral left hip and femur from 10/12/2020 at 1540. Compared to fluoroscopic spot images from 12/12/2019.    IMPRESSION:  Redemonstrated short proximal left femoral IM adonay/nail with compression screw and distal interlocking screw. Left femoral head migrated and displaced from the compression screw appearing rotated counter clockwise medially. Tip of the compression screw eroded through the left acetabular roof. No dislocations or acute appearing fractures.    Intact pelvic and obturator rings and symmetrically aligned spaced SI joints and pubic symphysis. Advanced lower lumbar spine degenerative change. Preserved right hip joint space. Advanced left knee medial tibiofemoral compartment osteoarthritic change.    Rounded lamellated calcific density focus projects through distal femoral metaphysis, possibly a superimposed suprapatellar degenerative osseous body.    Generalized osteopenia otherwise no discrete suspicious lytic or blastic lesions.    Vascular calcifications.    < end of copied text >        < from: CT Pelvis Bony Only No Cont (10.12.20 @ 17:38) >  EXAM:  CT PELVIS BONY ONLY      PROCEDURE DATE:  Oct 12 2020   INTERPRETATION:  CT OF THE BONY PELVIS WITHOUT CONTRAST.    CLINICAL INFORMATION: Pelvic pain. Evaluate for fracture. History of prior intramedullary nail.  TECHNIQUE: Axial CT images were obtained of the bony pelvis with coronal and sagittal reconstructions. No contrast was administered.    FINDINGS: The bones are demineralized.    The patient is status post prior ORIF of the left hip. The femoral neck screw has protruded beyond the confines of the femoral head across the joint into the acetabulum with erosive change and osseous resorption around the screw within the acetabulum. There is curvilinear lucency and surrounding sclerosis within the femoral head consistent with underlying avascular necrosis. There is lucency within the femoral head and neck surrounding the screw compatible with screw loosening/osseous resorptive change. A small portion of the previously noted intertrochanteric fracture line is still visualized. However there is bony bridging across the majority of the fracture site.    There is no right hip fracture identified.    There is moderate degenerative change of the pubic symphysis. Lower lumbar facet arthrosis and degenerative disc disease is present.    Axial calcification is present.    IMPRESSION:    Migration of the femoral neck screw within the left hip beyond the confines of the femoral head and across the joint space into the acetabulum with adjacent erosive and osseous resorptivechange. Underlying avascular necrosis of the left femoral head.    No acute fracture.    < end of copied text >

## 2020-10-13 NOTE — PROGRESS NOTE ADULT - SUBJECTIVE AND OBJECTIVE BOX
PROGRESS NOTE:   Authored by Sharron Almeida MD  Pager: [facility] [phone]; [facility] [phone]    Patient is a 89y old  Female who presents with a chief complaint of Fall (12 Oct 2020 19:03)      SUBJECTIVE / OVERNIGHT EVENTS:    ADDITIONAL REVIEW OF SYSTEMS:  CONSTITUTIONAL: No weakness, fevers or chills  EYES/ENT: No visual changes;  No vertigo or throat pain   NECK: No pain or stiffness  RESPIRATORY: No cough, wheezing, hemoptysis; No shortness of breath  CARDIOVASCULAR: No chest pain or palpitations  GASTROINTESTINAL: No abdominal pain; nausea, vomiting;  diarrhea or constipation. No hemetemesis, melena or hematochezia.  GENITOURINARY: No dysuria, frequency or hematuria  NEUROLOGICAL: No numbness or weakness  SKIN: No itching, burning, rashes, or lesions     MEDICATIONS  (STANDING):  amLODIPine   Tablet 10 milliGRAM(s) Oral daily  brimonidine 0.2% Ophthalmic Solution 1 Drop(s) Both EYES two times a day  influenza   Vaccine 0.5 milliLiter(s) IntraMuscular once  lisinopril 40 milliGRAM(s) Oral daily  pantoprazole  Injectable 40 milliGRAM(s) IV Push two times a day  timolol 0.5% Solution 1 Drop(s) Both EYES two times a day    MEDICATIONS  (PRN):  acetaminophen   Tablet .. 650 milliGRAM(s) Oral every 6 hours PRN Temp greater or equal to 38C (100.4F), Mild Pain (1 - 3)      CAPILLARY BLOOD GLUCOSE        I&O's Summary    12 Oct 2020 07:01  -  13 Oct 2020 07:00  --------------------------------------------------------  IN: 520 mL / OUT: 200 mL / NET: 320 mL        PHYSICAL EXAM:  Vital Signs Last 24 Hrs  T(C): 36.4 (13 Oct 2020 05:50), Max: 36.6 (12 Oct 2020 23:17)  T(F): 97.5 (13 Oct 2020 05:50), Max: 97.9 (12 Oct 2020 23:17)  HR: 62 (13 Oct 2020 05:50) (59 - 86)  BP: 137/66 (13 Oct 2020 05:50) (127/74 - 160/64)  BP(mean): --  RR: 16 (13 Oct 2020 05:50) (12 - 18)  SpO2: 100% (13 Oct 2020 05:50) (96% - 100%)    CONSTITUTIONAL: NAD, well-developed  RESPIRATORY: Normal respiratory effort; lungs are clear to auscultation bilaterally  CARDIOVASCULAR: Regular rate and rhythm, normal S1 and S2, no murmur/rub/gallop; No lower extremity edema; Peripheral pulses are 2+ bilaterally  ABDOMEN: Nontender to palpation, normoactive bowel sounds, no rebound/guarding; No hepatosplenomegaly  MUSCLOSKELETAL: no clubbing or cyanosis of digits; no joint swelling or tenderness to palpation  PSYCH: A+O to person, place, and time; affect appropriate    LABS:                        7.8    7.55  )-----------( 504      ( 13 Oct 2020 02:07 )             24.3     10-13    135  |  106  |  55<H>  ----------------------------<  86  4.1   |  21<L>  |  1.05    Ca    8.9      13 Oct 2020 02:07  Phos  1.8     10-13  Mg     2.4     10-13    TPro  6.9  /  Alb  2.8<L>  /  TBili  < 0.2<L>  /  DBili  x   /  AST  13  /  ALT  6   /  AlkPhos  56  10-12      CARDIAC MARKERS ( 12 Oct 2020 14:30 )  x     / x     / 57 u/L / x     / x                RADIOLOGY & ADDITIONAL TESTS:  Results Reviewed:   Imaging Personally Reviewed:  Electrocardiogram Personally Reviewed:    COORDINATION OF CARE:  Care Discussed with Consultants/Other Providers [Y/N]:  Prior or Outpatient Records Reviewed [Y/N]:   PROGRESS NOTE:   Authored by Sharron Almeida MD  Pager: [facility] [phone]; [facility] [phone]    Patient is a 89y old  Female who presents with a chief complaint of Fall (12 Oct 2020 19:03)      SUBJECTIVE / OVERNIGHT EVENTS: overnight no acute events, received 1u prbc without any complication. Reports L hip pain has improved with bedrest. Pt still endorses weakness and numbness on LLE. Appetite remains poor, has been NPO for EGD tentatively scheduled today. Uses bedpan, urinating without pain, denies fecal incontinence and diarrhea.    ADDITIONAL REVIEW OF SYSTEMS:  CONSTITUTIONAL: + weakness and feels cold, No fever  EYES/ENT: No visual changes;  No vertigo or throat pain   NECK: No pain or stiffness  RESPIRATORY: No cough, wheezing, hemoptysis; No shortness of breath  CARDIOVASCULAR: No chest pain or palpitations  GASTROINTESTINAL: No abdominal pain; nausea, vomiting;  diarrhea or constipation. No hemetemesis, melena or hematochezia.  GENITOURINARY: No dysuria, frequency or hematuria  NEUROLOGICAL: No numbness or weakness  SKIN: No itching, burning, rashes, or lesions     MEDICATIONS  (STANDING):  amLODIPine   Tablet 10 milliGRAM(s) Oral daily  brimonidine 0.2% Ophthalmic Solution 1 Drop(s) Both EYES two times a day  influenza   Vaccine 0.5 milliLiter(s) IntraMuscular once  lisinopril 40 milliGRAM(s) Oral daily  pantoprazole  Injectable 40 milliGRAM(s) IV Push two times a day  timolol 0.5% Solution 1 Drop(s) Both EYES two times a day    MEDICATIONS  (PRN):  acetaminophen   Tablet .. 650 milliGRAM(s) Oral every 6 hours PRN Temp greater or equal to 38C (100.4F), Mild Pain (1 - 3)      CAPILLARY BLOOD GLUCOSE        I&O's Summary    12 Oct 2020 07:01  -  13 Oct 2020 07:00  --------------------------------------------------------  IN: 520 mL / OUT: 200 mL / NET: 320 mL        PHYSICAL EXAM:  Vital Signs Last 24 Hrs  T(C): 36.4 (13 Oct 2020 05:50), Max: 36.6 (12 Oct 2020 23:17)  T(F): 97.5 (13 Oct 2020 05:50), Max: 97.9 (12 Oct 2020 23:17)  HR: 62 (13 Oct 2020 05:50) (59 - 86)  BP: 137/66 (13 Oct 2020 05:50) (127/74 - 160/64)  BP(mean): --  RR: 16 (13 Oct 2020 05:50) (12 - 18)  SpO2: 100% (13 Oct 2020 05:50) (96% - 100%)    CONSTITUTIONAL: NAD, well-developed  RESPIRATORY: Normal respiratory effort; lungs are clear to auscultation bilaterally  CARDIOVASCULAR: Regular rate and rhythm, normal S1 and S2, no murmur/rub/gallop; No lower extremity edema; Peripheral pulses are 2+ bilaterally  ABDOMEN: Nontender to palpation, normoactive bowel sounds, no rebound/guarding; No hepatosplenomegaly  MUSCLOSKELETAL: no clubbing or cyanosis of digits; no joint swelling or tenderness to palpation  PSYCH: A+O to person, place, and time; affect appropriate    LABS:                        7.8    7.55  )-----------( 504      ( 13 Oct 2020 02:07 )             24.3     10-13    135  |  106  |  55<H>  ----------------------------<  86  4.1   |  21<L>  |  1.05    Ca    8.9      13 Oct 2020 02:07  Phos  1.8     10-13  Mg     2.4     10-13    TPro  6.9  /  Alb  2.8<L>  /  TBili  < 0.2<L>  /  DBili  x   /  AST  13  /  ALT  6   /  AlkPhos  56  10-12      CARDIAC MARKERS ( 12 Oct 2020 14:30 )  x     / x     / 57 u/L / x     / x                RADIOLOGY & ADDITIONAL TESTS:  Results Reviewed:   Imaging Personally Reviewed:  Electrocardiogram Personally Reviewed:    COORDINATION OF CARE:  Care Discussed with Consultants/Other Providers [Y/N]: GI service, Ortho service dietician, speech and swallow  Prior or Outpatient Records Reviewed [Y/N]:

## 2020-10-13 NOTE — DIETITIAN INITIAL EVALUATION ADULT. - PERTINENT MEDS FT
MEDICATIONS  (STANDING):  lisinopril 40 milliGRAM(s) Oral daily  pantoprazole  Injectable 40 milliGRAM(s) IV Push two times a day

## 2020-10-13 NOTE — DIETITIAN INITIAL EVALUATION ADULT. - PROBLEM SELECTOR PLAN 1
h/o left intertrochanteric fracture s/p left femur intramedullary nail done on 12/12/2019. 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 Associated numbness and weakness on LLE  -appreciate ortho recs  -pain mgmt with Tylenol, avoid NSAIDs due to GIB  -pending PT eval

## 2020-10-13 NOTE — PROGRESS NOTE ADULT - ASSESSMENT
89y/o F with h/o osteoporosis, HTN and left intertrochanteric fracture s/p IMN on 12/12/19 p/w fall/FTT found to have avascular necrosis of the hip and GIB

## 2020-10-13 NOTE — CONSULT NOTE ADULT - ASSESSMENT
Pt is a 89 y/o F w/ PMHx osteoporosis, HTN and left intertrochanteric fracture s/p IMN on 12/12/19 p/w fall, FTT, and dark stools who presented with Hgb 7.1 from baseline of 9-10 in setting of recent darks stools and chronic ibuprofen use.    #Anemia. Pt s/p 1U pRBCs for Hgb 7.1 with appropriate improvement to 8.1. Concern for upper GIB given recent development of dark stools and chronic daily ibuprofen use, suspect gastritis vs PUD. Pt amenable to EGD for further evaluation.  - continue with protonix 40 mg IV bid  - keep npo for EGD tentative today  - follow up iron studies, trend daily CBC, keep active T&S    Lu Dennis PGY3  
89F hc L hip IMN (12/2019) w/ decreased ability to thrive over past months, recent fall 1 week ability, difficulty ambulating with walker. Found to have possible UGI bleed pending EDG. Patient has acetabular hardware migration, degenerative changes, and left hip AVN. Imaging reviewed and no concern for acute injury    - pain control  - WBAT  - PT/OT  - work up for anemia as per primary team/GI  - no acute intervention at this time  - FU with Dr. Durán following discharge. Call [phone] to make appointment

## 2020-10-13 NOTE — DIETITIAN INITIAL EVALUATION ADULT. - PROBLEM SELECTOR PLAN 2
endorses epigastric pain, poor appetite, heavy NSAIDs use for L hip pain, occult blood. baseline hgb 9-10 on last admission  -appreciate GI recs   -avoid further NSAID use  -continue pantoprazole IV  -maintain type and screen  -monitor daily CBC  -pending iron studies

## 2020-10-13 NOTE — CONSULT NOTE ADULT - SUBJECTIVE AND OBJECTIVE BOX
GI Consult Note    Chief Complaint:  Patient is a 89y old  Female who presents with a chief complaint of Fall (13 Oct 2020 07:48)    HPI:  Pt is a 89 y/o F w/ PMHx osteoporosis, HTN and left intertrochanteric fracture s/p IMN on 19 p/w fall, FTT, and dark stools. Pt states she has had intermittent episodes of dark stools for the past two weeks, however she denies melena, hematochezia, nausea, vomiting, abdominal pain, GERD. Her last BM was the night of 10/11 prior to her hospitalization and she describes the stool as dark brown and soft. Pt endorses a many year history of daily ibuprofen use (2 x 800 mg daily). In the past, she took the pills with food but over the past few months she has had decreased po intake and takes them on an empty stomach. She denies ever having an EGD/colonoscopy.        On presentation to the ED, pt was hemodynamically stable. She was found to have a Hgb 7.1 from baseline of 9-10 and was transfused 1U pRBC. Today her Hgb is 8.1. FOBT +. Pt started on protonic 40 mg IV bid after loading dose.    Allergies:  No Known Allergies    Home Medications:  amLODIPine 10 mg oral tablet: 1 tab(s) orally once a day (12 Oct 2020 19:19)  brimonidine-timolol 0.2%-0.5% ophthalmic solution: 1 drop(s) to each affected eye every 12 hours (12 Oct 2020 19:19)  ibuprofen 800 mg oral tablet: 1 tab(s) orally 3 times a day, As Needed (12 Oct 2020 19:19)  lisinopril 40 mg oral tablet: 1 tab(s) orally once a day (12 Oct 2020 19:19)    Hospital Medications:  acetaminophen   Tablet .. 650 milliGRAM(s) Oral every 6 hours PRN  amLODIPine   Tablet 10 milliGRAM(s) Oral daily  brimonidine 0.2% Ophthalmic Solution 1 Drop(s) Both EYES two times a day  influenza   Vaccine 0.5 milliLiter(s) IntraMuscular once  lisinopril 40 milliGRAM(s) Oral daily  pantoprazole  Injectable 40 milliGRAM(s) IV Push two times a day  timolol 0.5% Solution 1 Drop(s) Both EYES two times a day    PMHX/PSHX:  HTN (hypertension)  History of hip surgery    Family history:  Denies history of GI malignancy.    Social History: Pt lives with . No hx of toxic habits.    ROS:   General:  +weight loss, fatigue. no fevers, chills, night sweats   ENT:  No sore throat, dysphagia  CV:  No pain, palpitations   Resp:  No dyspnea, cough   GI:  No pain, No nausea, No vomiting, No diarrhea, No constipation, No fever, No pruritis, No rectal bleeding, No tarry stools, No dysphagia  :  No pain, bleeding   Muscle:  No pain, weakness  Neuro:  No weakness, memory problems  Psych:  No fatigue, insomnia   Endocrine:  No polyuria, polydipsia   Heme:  No petechiae, ecchymosis   Skin:  No rash, edema    PHYSICAL EXAM:   Vital Signs:  Vital Signs Last 24 Hrs  T(C): 36.4 (13 Oct 2020 05:50), Max: 36.6 (12 Oct 2020 23:17)  T(F): 97.5 (13 Oct 2020 05:50), Max: 97.9 (12 Oct 2020 23:17)  HR: 62 (13 Oct 2020 05:50) (59 - 86)  BP: 137/66 (13 Oct 2020 05:50) (127/74 - 160/64)  BP(mean): --  RR: 16 (13 Oct 2020 05:50) (12 - 18)  SpO2: 100% (13 Oct 2020 05:50) (96% - 100%)  Daily Height in cm: 157.48 (12 Oct 2020 23:17)    Daily Weight in k.5 (13 Oct 2020 08:57)    GENERAL:  Appears stated age, well-groomed, thin appearing  HEENT: NC/AT, conjunctivae clear and pink, sclera -anicteric  CHEST: Full & symmetric excursion, no increased effort, breath sounds clear  HEART: Regular rhythm, S1, S2, no murmur/rub/S3/S4   ABDOMEN: Soft, mild tenderness to palpation of epigastrium, non-distended, normoactive bowel sounds,  no masses ,no hepato-splenomegaly, no signs of chronic liver disease  EXTREMITIES: no cyanosis, clubbing or edema  SKIN:  No rash/erythema/ecchymoses/petechiae/wounds/abscess/warm/dry  NEURO:  AAOx3, no asterixis, no tremor, no encephalopathy    LABS:                        8.1    7.60  )-----------( 518      ( 13 Oct 2020 07:40 )             24.4     10-13    136  |  106  |  49<H>  ----------------------------<  81  3.8   |  19<L>  |  0.89    Ca    9.0      13 Oct 2020 07:40  Phos  2.4     10-13  Mg     2.3     10-13    TPro  6.9  /  Alb  2.8<L>  /  TBili  < 0.2<L>  /  DBili  x   /  AST  13  /  ALT  6   /  AlkPhos  56  10-12    LIVER FUNCTIONS - ( 12 Oct 2020 14:30 )  Alb: 2.8 g/dL / Pro: 6.9 g/dL / ALK PHOS: 56 u/L / ALT: 6 u/L / AST: 13 u/L / GGT: x           PT/INR - ( 13 Oct 2020 07:40 )   PT: 13.7 SEC;   INR: 1.20          PTT - ( 13 Oct 2020 07:40 )  PTT:28.4 SEC        Imaging:

## 2020-10-13 NOTE — DIETITIAN INITIAL EVALUATION ADULT. - PERTINENT LABORATORY DATA
10-13 Na136 mmol/L Glu 81 mg/dL K+ 3.8 mmol/L Cr  0.89 mg/dL BUN 49 mg/dL<H> 10-13 Phos 2.4 mg/dL<L> 10-12 Alb 2.8 g/dL<L>

## 2020-10-13 NOTE — PROGRESS NOTE ADULT - PROBLEM SELECTOR PLAN 6
VTE ppx: mechanical SCD only due to GIB  Diet: DASH/TLC with Ensure Enlive - appreciate dietician recs  pending PT VTE ppx: mechanical SCD only due to GIB  Diet: NPO for procedure (prev DASH/TLC with Ensure Enlive)  -appreciate dietician recs  Dispo planning: consult PT

## 2020-10-13 NOTE — PROGRESS NOTE ADULT - PROBLEM SELECTOR PLAN 1
h/o left intertrochanteric fracture s/p left femur intramedullary nail done on 12/12/2019. 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 Associated numbness and weakness on LLE  -appreciate ortho recs  -pain mgmt with Tylenol, avoid NSAIDs due to GIB  -pending PT eval h/o left intertrochanteric fracture s/p left femur intramedullary nail done on 12/12/2019 by Dr. Durán. 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 Associated numbness and weakness on LLE  -appreciate ortho recs  -pain mgmt with Tylenol, avoid NSAIDs due to GIB  -bedrest until ortho eval, consult PT

## 2020-10-13 NOTE — PROGRESS NOTE ADULT - PROBLEM SELECTOR PLAN 5
endorses poor appetite for past 2 months with loss of taste in recent weeks  -appreciate dietician recs endorses poor appetite for past 2 months with loss of taste in recent weeks  -appreciate dietician recs and speech/swallow study after egd

## 2020-10-13 NOTE — PROGRESS NOTE ADULT - PROBLEM SELECTOR PLAN 2
endorses epigastric pain, poor appetite, heavy NSAIDs use for L hip pain, occult blood. baseline hgb 9-10 on last admission. received 1u prbc 10/12 with some response (hgb 7.1 to 7.8)  -appreciate GI recs   -avoid further NSAID use  -continue pantoprazole IV  -maintain type and screen  -monitor daily CBC  -pending iron studies endorses epigastric pain, poor appetite, heavy NSAIDs use for L hip pain, occult blood. baseline hgb 9-10 on last admission. received 1u prbc 10/12 with some response (hgb 7.1 to 7.8)  -appreciate GI recs - DDx gastritis vs PUD  -pending egd findings  -avoid further NSAID use  -continue pantoprazole IV bid  -maintain type and screen  -monitor daily CBC  -pending iron studies

## 2020-10-13 NOTE — DIETITIAN INITIAL EVALUATION ADULT. - PHYSICAL APPEARANCE
other (specify)/Severe subcutaneous fat loss of orbital, buccal, area overlying ribs & upper arm regions.  Severe muscle loss of temple, clavicle, acromion and patellar regions as evidenced by protruding, prominent bones with depleted muscle definition./underweight/emaciated No noted pressure injuries.    2+ edema left hip and foot  3+ edema right knee, ankle, foot

## 2020-10-13 NOTE — DIETITIAN INITIAL EVALUATION ADULT. - OTHER INFO
Pt. NPO @ this time.  Admitted w Dx GIB and avascular necrosis of hip.      Pt. reports poor PO intake x 2 months PTA with resultant weight loss.  States usual body weight was '140lbs' well over 1 year ago.  She is unable to quantify approximate amount of recent weight decrease.  Per chart review:  54.7kg (Dec 2019)  44.5kg (currently Oct 2020)  She tried Ensure supplement for first time last week but claims it gave her diarrhea.      When asked, endorses swallowing difficulty with solids PTA and that food "just won't go down" and she has to spit it out.      NKFA & denies nausea/vomiting/diarrhea.  Last BM 2 days, per Pt.

## 2020-10-14 DIAGNOSIS — N18.31 CHRONIC KIDNEY DISEASE, STAGE 3A: ICD-10-CM

## 2020-10-14 DIAGNOSIS — E43 UNSPECIFIED SEVERE PROTEIN-CALORIE MALNUTRITION: ICD-10-CM

## 2020-10-14 LAB
ANION GAP SERPL CALC-SCNC: 13 MMO/L — SIGNIFICANT CHANGE UP (ref 7–14)
BASOPHILS # BLD AUTO: 0.01 K/UL — SIGNIFICANT CHANGE UP (ref 0–0.2)
BASOPHILS NFR BLD AUTO: 0.2 % — SIGNIFICANT CHANGE UP (ref 0–2)
BUN SERPL-MCNC: 27 MG/DL — HIGH (ref 7–23)
CALCIUM SERPL-MCNC: 9.1 MG/DL — SIGNIFICANT CHANGE UP (ref 8.4–10.5)
CHLORIDE SERPL-SCNC: 104 MMOL/L — SIGNIFICANT CHANGE UP (ref 98–107)
CO2 SERPL-SCNC: 21 MMOL/L — LOW (ref 22–31)
CREAT SERPL-MCNC: 0.78 MG/DL — SIGNIFICANT CHANGE UP (ref 0.5–1.3)
EOSINOPHIL # BLD AUTO: 0.4 K/UL — SIGNIFICANT CHANGE UP (ref 0–0.5)
EOSINOPHIL NFR BLD AUTO: 7 % — HIGH (ref 0–6)
FERRITIN SERPL-MCNC: 287.1 NG/ML — HIGH (ref 15–150)
GLUCOSE SERPL-MCNC: 81 MG/DL — SIGNIFICANT CHANGE UP (ref 70–99)
HCT VFR BLD CALC: 26.4 % — LOW (ref 34.5–45)
HCT VFR BLD CALC: 30 % — LOW (ref 34.5–45)
HGB BLD-MCNC: 8.7 G/DL — LOW (ref 11.5–15.5)
HGB BLD-MCNC: 9.6 G/DL — LOW (ref 11.5–15.5)
IMM GRANULOCYTES NFR BLD AUTO: 0.5 % — SIGNIFICANT CHANGE UP (ref 0–1.5)
IRON SATN MFR SERPL: 143 UG/DL — SIGNIFICANT CHANGE UP (ref 140–530)
IRON SATN MFR SERPL: 33 UG/DL — SIGNIFICANT CHANGE UP (ref 30–160)
LYMPHOCYTES # BLD AUTO: 0.8 K/UL — LOW (ref 1–3.3)
LYMPHOCYTES # BLD AUTO: 13.9 % — SIGNIFICANT CHANGE UP (ref 13–44)
MAGNESIUM SERPL-MCNC: 2.2 MG/DL — SIGNIFICANT CHANGE UP (ref 1.6–2.6)
MCHC RBC-ENTMCNC: 28.4 PG — SIGNIFICANT CHANGE UP (ref 27–34)
MCHC RBC-ENTMCNC: 28.5 PG — SIGNIFICANT CHANGE UP (ref 27–34)
MCHC RBC-ENTMCNC: 32 % — SIGNIFICANT CHANGE UP (ref 32–36)
MCHC RBC-ENTMCNC: 33 % — SIGNIFICANT CHANGE UP (ref 32–36)
MCV RBC AUTO: 86.6 FL — SIGNIFICANT CHANGE UP (ref 80–100)
MCV RBC AUTO: 88.8 FL — SIGNIFICANT CHANGE UP (ref 80–100)
MONOCYTES # BLD AUTO: 0.33 K/UL — SIGNIFICANT CHANGE UP (ref 0–0.9)
MONOCYTES NFR BLD AUTO: 5.7 % — SIGNIFICANT CHANGE UP (ref 2–14)
NEUTROPHILS # BLD AUTO: 4.18 K/UL — SIGNIFICANT CHANGE UP (ref 1.8–7.4)
NEUTROPHILS NFR BLD AUTO: 72.7 % — SIGNIFICANT CHANGE UP (ref 43–77)
NRBC # FLD: 0 K/UL — SIGNIFICANT CHANGE UP (ref 0–0)
NRBC # FLD: 0 K/UL — SIGNIFICANT CHANGE UP (ref 0–0)
PHOSPHATE SERPL-MCNC: 2.1 MG/DL — LOW (ref 2.5–4.5)
PLATELET # BLD AUTO: 558 K/UL — HIGH (ref 150–400)
PLATELET # BLD AUTO: 623 K/UL — HIGH (ref 150–400)
PMV BLD: 8.9 FL — SIGNIFICANT CHANGE UP (ref 7–13)
PMV BLD: 9.1 FL — SIGNIFICANT CHANGE UP (ref 7–13)
POTASSIUM SERPL-MCNC: 3.5 MMOL/L — SIGNIFICANT CHANGE UP (ref 3.5–5.3)
POTASSIUM SERPL-SCNC: 3.5 MMOL/L — SIGNIFICANT CHANGE UP (ref 3.5–5.3)
RBC # BLD: 3.05 M/UL — LOW (ref 3.8–5.2)
RBC # BLD: 3.38 M/UL — LOW (ref 3.8–5.2)
RBC # FLD: 13.3 % — SIGNIFICANT CHANGE UP (ref 10.3–14.5)
RBC # FLD: 13.4 % — SIGNIFICANT CHANGE UP (ref 10.3–14.5)
SODIUM SERPL-SCNC: 138 MMOL/L — SIGNIFICANT CHANGE UP (ref 135–145)
TRANSFERRIN SERPL-MCNC: 110 MG/DL — LOW (ref 200–360)
UIBC SERPL-MCNC: 110 UG/DL — SIGNIFICANT CHANGE UP (ref 110–370)
WBC # BLD: 5.75 K/UL — SIGNIFICANT CHANGE UP (ref 3.8–10.5)
WBC # BLD: 6.16 K/UL — SIGNIFICANT CHANGE UP (ref 3.8–10.5)
WBC # FLD AUTO: 5.75 K/UL — SIGNIFICANT CHANGE UP (ref 3.8–10.5)
WBC # FLD AUTO: 6.16 K/UL — SIGNIFICANT CHANGE UP (ref 3.8–10.5)

## 2020-10-14 PROCEDURE — 99233 SBSQ HOSP IP/OBS HIGH 50: CPT | Mod: GC

## 2020-10-14 PROCEDURE — 99232 SBSQ HOSP IP/OBS MODERATE 35: CPT | Mod: GC

## 2020-10-14 RX ORDER — POTASSIUM PHOSPHATE, MONOBASIC POTASSIUM PHOSPHATE, DIBASIC 236; 224 MG/ML; MG/ML
15 INJECTION, SOLUTION INTRAVENOUS ONCE
Refills: 0 | Status: COMPLETED | OUTPATIENT
Start: 2020-10-14 | End: 2020-10-14

## 2020-10-14 RX ORDER — MULTIVIT-MIN/FERROUS GLUCONATE 9 MG/15 ML
15 LIQUID (ML) ORAL DAILY
Refills: 0 | Status: DISCONTINUED | OUTPATIENT
Start: 2020-10-14 | End: 2020-10-21

## 2020-10-14 RX ORDER — POLYETHYLENE GLYCOL 3350 17 G/17G
17 POWDER, FOR SOLUTION ORAL DAILY
Refills: 0 | Status: DISCONTINUED | OUTPATIENT
Start: 2020-10-14 | End: 2020-10-21

## 2020-10-14 RX ADMIN — Medication 1 DROP(S): at 06:30

## 2020-10-14 RX ADMIN — BRIMONIDINE TARTRATE 1 DROP(S): 2 SOLUTION/ DROPS OPHTHALMIC at 06:27

## 2020-10-14 RX ADMIN — Medication 1 GRAM(S): at 00:05

## 2020-10-14 RX ADMIN — POLYETHYLENE GLYCOL 3350 17 GRAM(S): 17 POWDER, FOR SOLUTION ORAL at 14:30

## 2020-10-14 RX ADMIN — POTASSIUM PHOSPHATE, MONOBASIC POTASSIUM PHOSPHATE, DIBASIC 62.5 MILLIMOLE(S): 236; 224 INJECTION, SOLUTION INTRAVENOUS at 09:19

## 2020-10-14 RX ADMIN — Medication 1 GRAM(S): at 19:24

## 2020-10-14 RX ADMIN — BRIMONIDINE TARTRATE 1 DROP(S): 2 SOLUTION/ DROPS OPHTHALMIC at 19:22

## 2020-10-14 RX ADMIN — Medication 1 GRAM(S): at 06:27

## 2020-10-14 RX ADMIN — PANTOPRAZOLE SODIUM 10 MG/HR: 20 TABLET, DELAYED RELEASE ORAL at 23:03

## 2020-10-14 RX ADMIN — Medication 15 MILLILITER(S): at 19:22

## 2020-10-14 RX ADMIN — Medication 1 GRAM(S): at 14:30

## 2020-10-14 RX ADMIN — LISINOPRIL 40 MILLIGRAM(S): 2.5 TABLET ORAL at 06:26

## 2020-10-14 RX ADMIN — Medication 1 GRAM(S): at 23:03

## 2020-10-14 RX ADMIN — AMLODIPINE BESYLATE 10 MILLIGRAM(S): 2.5 TABLET ORAL at 06:26

## 2020-10-14 RX ADMIN — Medication 1 DROP(S): at 19:23

## 2020-10-14 NOTE — PHYSICAL THERAPY INITIAL EVALUATION ADULT - GENERAL OBSERVATIONS, REHAB EVAL
Patient was received and left supine with head of bed elevated, bed alarm activated & call bell in reach.

## 2020-10-14 NOTE — PROGRESS NOTE ADULT - SUBJECTIVE AND OBJECTIVE BOX
PROGRESS NOTE:   Authored by Sharron Almeida MD  Pager: [facility] [phone]; [facility] [phone]    Patient is a 89y old  Female who presents with a chief complaint of Fall (13 Oct 2020 11:14)      SUBJECTIVE / OVERNIGHT EVENTS: overnight no acute events, Pt denied any pain s/p EGD, able to finish meal with clear liquid diet. Left hip pain is worsened when Pt stretches her left leg or foot, otherwise tolerable without requiring pain medications PRN. Has not had any BM. Understands the findings of EGD and need for sulcrafate, PPI infusion, and speech/swallow eval. Also discussed plan for outpatient f/u with ortho clinic without acute intervention at this time.    ADDITIONAL REVIEW OF SYSTEMS:  CONSTITUTIONAL: + weakness in setting of LE pain. No fevers or chills  EYES/ENT: No visual changes;  No vertigo. No throat pain   NECK: No pain or stiffness  RESPIRATORY: No cough, wheezing, hemoptysis; No shortness of breath  CARDIOVASCULAR: No chest pain or palpitations  GASTROINTESTINAL: +poor appetite, constipation. No abdominal pain; nausea, vomiting;  diarrhea. No hemetemesis, melena or hematochezia.  GENITOURINARY: No dysuria, frequency or hematuria  NEUROLOGICAL: +LLE numbness and weakness  SKIN: No itching, burning, rashes, or lesions     MEDICATIONS  (STANDING):  amLODIPine   Tablet 10 milliGRAM(s) Oral daily  brimonidine 0.2% Ophthalmic Solution 1 Drop(s) Both EYES two times a day  influenza   Vaccine 0.5 milliLiter(s) IntraMuscular once  lisinopril 40 milliGRAM(s) Oral daily  pantoprazole Infusion 8 mG/Hr (10 mL/Hr) IV Continuous <Continuous>  sucralfate 1 Gram(s) Oral four times a day  timolol 0.5% Solution 1 Drop(s) Both EYES two times a day    MEDICATIONS  (PRN):  acetaminophen   Tablet .. 650 milliGRAM(s) Oral every 6 hours PRN Temp greater or equal to 38C (100.4F), Mild Pain (1 - 3)      CAPILLARY BLOOD GLUCOSE        I&O's Summary    13 Oct 2020 07:01  -  14 Oct 2020 07:00  --------------------------------------------------------  IN: 0 mL / OUT: 300 mL / NET: -300 mL        PHYSICAL EXAM:  Vital Signs Last 24 Hrs  T(C): 36.4 (14 Oct 2020 06:22), Max: 36.7 (13 Oct 2020 13:18)  T(F): 97.5 (14 Oct 2020 06:22), Max: 98.1 (13 Oct 2020 13:18)  HR: 56 (14 Oct 2020 06:22) (56 - 88)  BP: 139/56 (14 Oct 2020 06:22) (115/83 - 155/68)  BP(mean): --  RR: 18 (14 Oct 2020 06:22) (13 - 18)  SpO2: 100% (14 Oct 2020 06:22) (100% - 100%)    CONSTITUTIONAL: NAD, very thin  RESPIRATORY: Normal respiratory effort; lungs are clear to auscultation bilaterally  CARDIOVASCULAR: Regular rate and rhythm, normal S1 and S2, no murmur/rub/gallop; No lower extremity edema; Peripheral pulses are 2+ bilaterally  ABDOMEN: Nontender to palpation, normoactive bowel sounds, no rebound/guarding; No hepatosplenomegaly  MUSCLOSKELETAL: no clubbing or cyanosis of digits; no joint swelling or tenderness to palpation  PSYCH: A+O to person, place, and time; affect appropriate  SKIN: dry, flaky     LABS:                        8.1    7.60  )-----------( 518      ( 13 Oct 2020 07:40 )             24.4     10-13    136  |  106  |  49<H>  ----------------------------<  81  3.8   |  19<L>  |  0.89    Ca    9.0      13 Oct 2020 07:40  Phos  2.4     10-13  Mg     2.3     10-13    TPro  6.9  /  Alb  2.8<L>  /  TBili  < 0.2<L>  /  DBili  x   /  AST  13  /  ALT  6   /  AlkPhos  56  10-12    PT/INR - ( 13 Oct 2020 07:40 )   PT: 13.7 SEC;   INR: 1.20          PTT - ( 13 Oct 2020 07:40 )  PTT:28.4 SEC  CARDIAC MARKERS ( 12 Oct 2020 14:30 )  x     / x     / 57 u/L / x     / x          < from: Upper Endoscopy (10.13.20 @ 13:18) >  Findings:       The Z-line was regular and was found 36 cm from the incisors.       One linear esophageal ulcer with no bleeding and no stigmata of recent        bleeding was found 36 cm from the incisors.       The exam of the esophagus was otherwise normal.       The entire examined stomach was normal.       One large, non-bleeding, very cratered duodenal ulcer with a flat        pigmented spot (Chuy Class IIc) was found in the duodenal bulb. The        lesion was approximately 40 mm in largest dimension. Interventions for        prevention of re-bleeding were not performed due to perceived risk of        perforation in setting of severely ulcerated small bowel wall.       Two non-bleeding cratered duodenal ulcers with a clean ulcer base        (Chuy Class III) were found in the first portion of the duodenum. The        largest lesion was approximately 8 mm in largest dimension.       The second portion of the duodenum was normal.                                   Impression:          - Z-line regular, 36 cm from the incisors.                       - Single, ulcerated, non-bleeding esophageal ulcer at                        the GE junction.                       - Normal stomach.                       - One non-bleeding, very large ulcer in the duodenal                        bulb with a flat pigmented spot (Chuy Class IIc).                        This is the likely cause of melena.                       - Two non-bleeding, ulcers in the 1st part of the                        duodenum with a clean ulcer base (Chuy Class III).                       - No specimens collected.    < end of copied text >        RADIOLOGY & ADDITIONAL TESTS:  Results Reviewed:   Imaging Personally Reviewed:  Electrocardiogram Personally Reviewed:    COORDINATION OF CARE:  Care Discussed with Consultants/Other Providers [Y/N]: ortho and GI service, dietician  Prior or Outpatient Records Reviewed [Y/N]:

## 2020-10-14 NOTE — PROGRESS NOTE ADULT - ASSESSMENT
Impression:    89 year old woman with osteoporosis, HTN and left intertrochanteric fracture s/p IMN on 12/12/19 admitted for fall, failure to thrive, and dark stools with acute blood loss anemia secondary to severe PUD in setting of chronic ibuprofen use.    #Peptic ulcer disease secondary to NSAID overuse: Single large, cratered duodenal ulcer with evidence/stigmata of recent bleeding however no interventions for prevention of re-bleeding performed due to perceived risk of small bowel perforation.  #History of L. intertrochanteric fracture s/p IMN 12/2019 with chronic hip pain requiring NSAID overuse   #Underweight with BMI <19.0  #Hypertension    Recommendations:  - conservative management with continuous PPI infusion, and monitoring of Hb on serial CBCs and bowel movements to assess for rebleeding  - as patient remains at high risk for re-bleeding, would repeat upper endoscopy for downtrending Hb/additional pRBC transfusions/recurrent melena to attempt possible interventions  - continue clear liquid diet for today  - non-NSAIDS for pain control only  - GI to follow    Edelmira Avery PGY-5  Gastroenterology Fellow  Page #[phone]   Page #[phone] 5pm-7am on weekdays, and on weekends

## 2020-10-14 NOTE — PROGRESS NOTE ADULT - PROBLEM SELECTOR PLAN 2
endorses epigastric pain, poor appetite, heavy NSAIDs use for L hip pain, occult blood. baseline hgb 9-10 on last admission. received 1u prbc 10/12 with some response (hgb 7.1 to 7.8)  -appreciate GI recs  -egd found large duodenal ulcer 40mm diameter likely source of melena and two smaller duodenal ulcers <8mm. No biopsies taken  -tx sulcrafate qid, pantoprazole IV infusion for 72hrs, clear liquid diet until speech/swallow eval, avoid further NSAID use  -maintain type and screen  -monitor daily CBC  -pending iron studies endorses epigastric pain, poor appetite, heavy NSAIDs use for L hip pain, occult blood. baseline hgb 9-10 on last admission. received 1u prbc 10/12 with some response (hgb 7.1 to 7.8) +normocytic anemia, high ferritin and thrombocytosis in setting of normal iron studies suggest anemia of chronic inflammation or CKD.  -appreciate GI recs  -egd found large duodenal ulcer 40mm diameter likely source of melena and two smaller duodenal ulcers <8mm. No biopsies taken due to risk of bowel perforation  -follow H pylori stool Ag  -tx sulcrafate qid, pantoprazole IV infusion for 72hrs, clear liquid diet, manage pain in non-NSAIDs  -maintain type and screen  -monitor daily CBC

## 2020-10-14 NOTE — PROGRESS NOTE ADULT - PROBLEM SELECTOR PLAN 7
VTE ppx: mechanical SCD only due to GIB  Diet: clear liquid diet  -appreciate dietician recs  Dispo planning: consult PT

## 2020-10-14 NOTE — PHYSICAL THERAPY INITIAL EVALUATION ADULT - PERTINENT HX OF CURRENT PROBLEM, REHAB EVAL
Admitted with left Lower Extremity numbness and weakness s/p fall about a week prior to admission; h/o left intertrochanteric fracture s/p left femur intramedullary nail done on 12/12/2019

## 2020-10-14 NOTE — PROGRESS NOTE ADULT - SUBJECTIVE AND OBJECTIVE BOX
Chief Complaint:  Patient is a 89y old  Female who presents with a chief complaint of Fall (14 Oct 2020 07:54)      Interval Events:     Upper endoscopy yesterday revealed very large, deeply cratered ulcer in duodenal bulb with flat pigmented spot, possible indicative of recently bleeding vessel. No active bleeding during or after the procedure. No interventions performed for prevention of rebleeding due to perceived risk of bowel perforation.   Patient has no complaints post procedure. She denies bowel movements/recurrent melena since hospitalization.     Allergies:  No Known Allergies      Hospital Medications:  acetaminophen   Tablet .. 650 milliGRAM(s) Oral every 6 hours PRN  amLODIPine   Tablet 10 milliGRAM(s) Oral daily  brimonidine 0.2% Ophthalmic Solution 1 Drop(s) Both EYES two times a day  influenza   Vaccine 0.5 milliLiter(s) IntraMuscular once  lisinopril 40 milliGRAM(s) Oral daily  pantoprazole Infusion 8 mG/Hr IV Continuous <Continuous>  polyethylene glycol 3350 17 Gram(s) Oral daily  sucralfate 1 Gram(s) Oral four times a day  timolol 0.5% Solution 1 Drop(s) Both EYES two times a day      PMHX/PSHX:  HTN (hypertension)    History of hip surgery    No significant past surgical history        Family history:      ROS:     General:  No weight loss, fevers, chills, night sweats, fatigue   Eyes:  No vision changes  ENT:  No sore throat, pain, runny nose  CV:  No chest pain, palpitations, dizziness   Resp:  No SOB, cough, wheezing  GI:  See HPI  :  No burning with urination, hematuria  Muscle:  No pain, weakness  Neuro:  No weakness/tingling, memory problems  Psych:  No fatigue, insomnia, mood problems, depression  Heme:  No easy bruisability  Skin:  No rash, edema      PHYSICAL EXAM:     GENERAL:  Well developed, no distress  HEENT:  NC/AT,  conjunctivae clear, sclera anicteric  CHEST:  Full & symmetric excursion, no increased effort w/ respirations  HEART:  Regular rhythm & rate  ABDOMEN:  Soft, non-tender, non-distended  EXTREMITIES:  no LE  edema  SKIN:  No rash/erythema/ecchymoses/petechiae/wounds/jaundice  NEURO:  Alert, oriented    Vital Signs:  Vital Signs Last 24 Hrs  T(C): 36.4 (14 Oct 2020 06:22), Max: 36.7 (13 Oct 2020 13:18)  T(F): 97.5 (14 Oct 2020 06:22), Max: 98.1 (13 Oct 2020 13:18)  HR: 56 (14 Oct 2020 06:22) (56 - 88)  BP: 139/56 (14 Oct 2020 06:22) (115/83 - 155/68)  BP(mean): --  RR: 18 (14 Oct 2020 06:22) (13 - 18)  SpO2: 100% (14 Oct 2020 06:22) (100% - 100%)  Daily Height in cm: 157 (13 Oct 2020 13:18)    Daily     LABS:                        8.7    5.75  )-----------( 558      ( 14 Oct 2020 06:24 )             26.4     10-14    138  |  104  |  27<H>  ----------------------------<  81  3.5   |  21<L>  |  0.78    Ca    9.1      14 Oct 2020 06:24  Phos  2.1     10-14  Mg     2.2     10-14    TPro  6.9  /  Alb  2.8<L>  /  TBili  < 0.2<L>  /  DBili  x   /  AST  13  /  ALT  6   /  AlkPhos  56  10-12    LIVER FUNCTIONS - ( 12 Oct 2020 14:30 )  Alb: 2.8 g/dL / Pro: 6.9 g/dL / ALK PHOS: 56 u/L / ALT: 6 u/L / AST: 13 u/L / GGT: x           PT/INR - ( 13 Oct 2020 07:40 )   PT: 13.7 SEC;   INR: 1.20          PTT - ( 13 Oct 2020 07:40 )  PTT:28.4 SEC        Imaging:

## 2020-10-14 NOTE — PROGRESS NOTE ADULT - PROBLEM SELECTOR PLAN 3
endorses poor appetite for past 2 months with loss of taste in recent weeks  -appreciate dietician recs and speech/swallow eval endorses poor appetite for past 2 months with loss of taste in recent weeks  -appreciate dietician recs   -pending speech/swallow eval dysphagia  -multivitamin for nutritional deficiency

## 2020-10-14 NOTE — OCCUPATIONAL THERAPY INITIAL EVALUATION ADULT - LIVES WITH, PROFILE
Patient reports living in a private home with her , grandchildren, and great grandchildren with 4 steps to enter + 16 steps to bed/bathroom. Patient has a tub shower with shower chair and grab bars.

## 2020-10-14 NOTE — PROGRESS NOTE ADULT - PROBLEM SELECTOR PLAN 1
h/o left intertrochanteric fracture s/p left femur intramedullary nail done on 12/12/2019 by Dr. Durán. 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 Associated numbness and weakness on LLE  -appreciate ortho recs - follow up outpatient  -pain mgmt with Tylenol, avoid NSAIDs due to GIB  -consult PT and OT h/o left intertrochanteric fracture s/p left femur intramedullary nail done on 12/12/2019 by Dr. Durán. 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 Associated numbness and weakness on LLE  -appreciate ortho recs - follow up outpatient  -pain mgmt with non-NSAIDs   -consult PT and OT

## 2020-10-14 NOTE — OCCUPATIONAL THERAPY INITIAL EVALUATION ADULT - PERTINENT HX OF CURRENT PROBLEM, REHAB EVAL
89 year old female with history of osteoporosis, HTN and left intertrochanteric fracture s/p IMN on 12/12/19 presenting s/p fall. CT hip showing underlying avascular necrosis of the left femoral head. Also found to have acute blood loss anemia secondary to severe PUD in setting of chronic ibuprofen use.

## 2020-10-14 NOTE — OCCUPATIONAL THERAPY INITIAL EVALUATION ADULT - DIAGNOSIS, OT EVAL
s/p fall, s/p peptic ulcer disease, s/p avascular necrosis of left hip; decreased functional mobility, decreased ADL performance

## 2020-10-14 NOTE — PROGRESS NOTE ADULT - PROBLEM SELECTOR PLAN 4
improved with 70mEq supplement on admission, 2.4 --> 4.1  -monitor daily BMP eGFR (AA) of 47 on admission, 36 in Dec 2019. normocytic anemia, high ferritin and thrombocytosis in setting of normal iron studies suggest anemia of chronic inflammation or CKD.   -mgmt of GIB per above and avoid NSAIDs

## 2020-10-14 NOTE — PROGRESS NOTE ADULT - PROBLEM SELECTOR PLAN 6
VTE ppx: mechanical SCD only due to GIB  Diet: clear liquid diet  -appreciate dietician recs  Dispo planning: consult PT improved with 70mEq supplement on admission, 2.4 --> 4.1  -monitor daily BMP

## 2020-10-14 NOTE — OCCUPATIONAL THERAPY INITIAL EVALUATION ADULT - GENERAL OBSERVATIONS, REHAB EVAL
Patient received semisupine in bed in NAD. +IV. Per RN Arline, patient okay to participate in OT evaluation.

## 2020-10-14 NOTE — PHARMACOTHERAPY INTERVENTION NOTE - COMMENTS
Discussed medication doses, administration, side effects, and monitoring with patient. Patient demonstrated understanding through teach back.

## 2020-10-15 ENCOUNTER — TRANSCRIPTION ENCOUNTER (OUTPATIENT)
Age: 85
End: 2020-10-15

## 2020-10-15 LAB
ALBUMIN SERPL ELPH-MCNC: 2.4 G/DL — LOW (ref 3.3–5)
ALP SERPL-CCNC: 55 U/L — SIGNIFICANT CHANGE UP (ref 40–120)
ALT FLD-CCNC: 6 U/L — SIGNIFICANT CHANGE UP (ref 4–33)
ANION GAP SERPL CALC-SCNC: 13 MMO/L — SIGNIFICANT CHANGE UP (ref 7–14)
AST SERPL-CCNC: 12 U/L — SIGNIFICANT CHANGE UP (ref 4–32)
BASOPHILS # BLD AUTO: 0.02 K/UL — SIGNIFICANT CHANGE UP (ref 0–0.2)
BASOPHILS NFR BLD AUTO: 0.3 % — SIGNIFICANT CHANGE UP (ref 0–2)
BILIRUB SERPL-MCNC: 0.3 MG/DL — SIGNIFICANT CHANGE UP (ref 0.2–1.2)
BUN SERPL-MCNC: 16 MG/DL — SIGNIFICANT CHANGE UP (ref 7–23)
CALCIUM SERPL-MCNC: 8.3 MG/DL — LOW (ref 8.4–10.5)
CHLORIDE SERPL-SCNC: 102 MMOL/L — SIGNIFICANT CHANGE UP (ref 98–107)
CO2 SERPL-SCNC: 17 MMOL/L — LOW (ref 22–31)
CREAT SERPL-MCNC: 0.64 MG/DL — SIGNIFICANT CHANGE UP (ref 0.5–1.3)
EOSINOPHIL # BLD AUTO: 0.46 K/UL — SIGNIFICANT CHANGE UP (ref 0–0.5)
EOSINOPHIL NFR BLD AUTO: 6.8 % — HIGH (ref 0–6)
GLUCOSE SERPL-MCNC: 74 MG/DL — SIGNIFICANT CHANGE UP (ref 70–99)
HCT VFR BLD CALC: 29.2 % — LOW (ref 34.5–45)
HGB BLD-MCNC: 9.3 G/DL — LOW (ref 11.5–15.5)
IMM GRANULOCYTES NFR BLD AUTO: 0.3 % — SIGNIFICANT CHANGE UP (ref 0–1.5)
LYMPHOCYTES # BLD AUTO: 0.83 K/UL — LOW (ref 1–3.3)
LYMPHOCYTES # BLD AUTO: 12.2 % — LOW (ref 13–44)
MAGNESIUM SERPL-MCNC: 1.9 MG/DL — SIGNIFICANT CHANGE UP (ref 1.6–2.6)
MCHC RBC-ENTMCNC: 28.6 PG — SIGNIFICANT CHANGE UP (ref 27–34)
MCHC RBC-ENTMCNC: 31.8 % — LOW (ref 32–36)
MCV RBC AUTO: 89.8 FL — SIGNIFICANT CHANGE UP (ref 80–100)
MONOCYTES # BLD AUTO: 0.41 K/UL — SIGNIFICANT CHANGE UP (ref 0–0.9)
MONOCYTES NFR BLD AUTO: 6 % — SIGNIFICANT CHANGE UP (ref 2–14)
NEUTROPHILS # BLD AUTO: 5.04 K/UL — SIGNIFICANT CHANGE UP (ref 1.8–7.4)
NEUTROPHILS NFR BLD AUTO: 74.4 % — SIGNIFICANT CHANGE UP (ref 43–77)
NRBC # FLD: 0 K/UL — SIGNIFICANT CHANGE UP (ref 0–0)
PHOSPHATE SERPL-MCNC: 1.8 MG/DL — LOW (ref 2.5–4.5)
PLATELET # BLD AUTO: 585 K/UL — HIGH (ref 150–400)
PMV BLD: 9.2 FL — SIGNIFICANT CHANGE UP (ref 7–13)
POTASSIUM SERPL-MCNC: 3.2 MMOL/L — LOW (ref 3.5–5.3)
POTASSIUM SERPL-SCNC: 3.2 MMOL/L — LOW (ref 3.5–5.3)
PROT SERPL-MCNC: 6.5 G/DL — SIGNIFICANT CHANGE UP (ref 6–8.3)
RBC # BLD: 3.25 M/UL — LOW (ref 3.8–5.2)
RBC # FLD: 13.2 % — SIGNIFICANT CHANGE UP (ref 10.3–14.5)
SARS-COV-2 RNA SPEC QL NAA+PROBE: SIGNIFICANT CHANGE UP
SODIUM SERPL-SCNC: 132 MMOL/L — LOW (ref 135–145)
WBC # BLD: 6.78 K/UL — SIGNIFICANT CHANGE UP (ref 3.8–10.5)
WBC # FLD AUTO: 6.78 K/UL — SIGNIFICANT CHANGE UP (ref 3.8–10.5)

## 2020-10-15 PROCEDURE — 99233 SBSQ HOSP IP/OBS HIGH 50: CPT | Mod: GC

## 2020-10-15 PROCEDURE — 99232 SBSQ HOSP IP/OBS MODERATE 35: CPT | Mod: GC

## 2020-10-15 RX ORDER — POTASSIUM PHOSPHATE, MONOBASIC POTASSIUM PHOSPHATE, DIBASIC 236; 224 MG/ML; MG/ML
30 INJECTION, SOLUTION INTRAVENOUS ONCE
Refills: 0 | Status: COMPLETED | OUTPATIENT
Start: 2020-10-15 | End: 2020-10-15

## 2020-10-15 RX ORDER — PANTOPRAZOLE SODIUM 20 MG/1
40 TABLET, DELAYED RELEASE ORAL
Refills: 0 | Status: DISCONTINUED | OUTPATIENT
Start: 2020-10-15 | End: 2020-10-16

## 2020-10-15 RX ADMIN — Medication 1 GRAM(S): at 17:51

## 2020-10-15 RX ADMIN — Medication 15 MILLILITER(S): at 13:43

## 2020-10-15 RX ADMIN — Medication 1 DROP(S): at 17:51

## 2020-10-15 RX ADMIN — PANTOPRAZOLE SODIUM 10 MG/HR: 20 TABLET, DELAYED RELEASE ORAL at 05:34

## 2020-10-15 RX ADMIN — PANTOPRAZOLE SODIUM 40 MILLIGRAM(S): 20 TABLET, DELAYED RELEASE ORAL at 17:52

## 2020-10-15 RX ADMIN — Medication 1 GRAM(S): at 23:23

## 2020-10-15 RX ADMIN — Medication 1 GRAM(S): at 05:37

## 2020-10-15 RX ADMIN — Medication 1 DROP(S): at 05:37

## 2020-10-15 RX ADMIN — Medication 1 GRAM(S): at 12:20

## 2020-10-15 RX ADMIN — LISINOPRIL 40 MILLIGRAM(S): 2.5 TABLET ORAL at 12:19

## 2020-10-15 RX ADMIN — AMLODIPINE BESYLATE 10 MILLIGRAM(S): 2.5 TABLET ORAL at 06:30

## 2020-10-15 RX ADMIN — BRIMONIDINE TARTRATE 1 DROP(S): 2 SOLUTION/ DROPS OPHTHALMIC at 05:38

## 2020-10-15 RX ADMIN — BRIMONIDINE TARTRATE 1 DROP(S): 2 SOLUTION/ DROPS OPHTHALMIC at 17:51

## 2020-10-15 RX ADMIN — POTASSIUM PHOSPHATE, MONOBASIC POTASSIUM PHOSPHATE, DIBASIC 83.33 MILLIMOLE(S): 236; 224 INJECTION, SOLUTION INTRAVENOUS at 09:52

## 2020-10-15 NOTE — PROGRESS NOTE ADULT - SUBJECTIVE AND OBJECTIVE BOX
House GI Progress Note    Chief Complaint:  Patient is a 89y old  Female who presents with a chief complaint of Fall (15 Oct 2020 07:18)    Interval Events / Subjective: No events overnight. Pt denies abdominal pain, nausea, vomiting, rectal bleeding. She has had no bowel movements since prior to admission. She is tolerating CLD.      REVIEW OF SYSTEMS:   General: No fever, chills, weakness  HEENT: No vision changes   CV: No chest pain, palpitations  Respiratory: No dyspnea, cough   GI: See HPI  : No hematuria, dysuria   Neuro: No headache, focal weakness   Psych: No depressed mood, anxiety or insomnia  Endocrine: No polyuria   Hematologic: No petechiae, ecchymoses   Skin: No rashes or lesions    MEDICATIONS:   MEDICATIONS  (STANDING):  amLODIPine   Tablet 10 milliGRAM(s) Oral daily  brimonidine 0.2% Ophthalmic Solution 1 Drop(s) Both EYES two times a day  influenza   Vaccine 0.5 milliLiter(s) IntraMuscular once  lisinopril 40 milliGRAM(s) Oral daily  multivitamin/minerals/iron Oral Solution (CENTRUM) 15 milliLiter(s) Oral daily  pantoprazole Infusion 8 mG/Hr (10 mL/Hr) IV Continuous <Continuous>  polyethylene glycol 3350 17 Gram(s) Oral daily  sucralfate 1 Gram(s) Oral four times a day  timolol 0.5% Solution 1 Drop(s) Both EYES two times a day    MEDICATIONS  (PRN):  acetaminophen   Tablet .. 650 milliGRAM(s) Oral every 6 hours PRN Temp greater or equal to 38C (100.4F), Mild Pain (1 - 3)      ALLERGIES:   Allergies    No Known Allergies    Intolerances        VITAL SIGNS:   Vital Signs Last 24 Hrs  T(C): 36.8 (15 Oct 2020 05:41), Max: 36.8 (15 Oct 2020 05:41)  T(F): 98.2 (15 Oct 2020 05:41), Max: 98.2 (15 Oct 2020 05:41)  HR: 58 (15 Oct 2020 05:41) (58 - 66)  BP: 139/55 (15 Oct 2020 05:41) (111/47 - 139/55)  BP(mean): --  RR: 18 (15 Oct 2020 05:41) (18 - 18)  SpO2: 100% (15 Oct 2020 05:41) (100% - 100%)  I&O's Summary      PHYSICAL EXAM:   GENERAL:  Appears stated age, well-groomed, well-nourished, no distress  HEENT:  NC/AT,  conjunctivae clear, sclera -anicteric  CHEST:  Full & symmetric excursion, no increased effort, breath sounds clear  HEART:  Regular rhythm, S1, S2, no murmur/rub/S3/S4,  no edema  ABDOMEN:  Soft, non-tender, non-distended, normoactive bowel sounds,  no masses, no hepato-splenomegaly,   EXTREMITIES: No cyanosis,clubbing or edema  SKIN:  No rash/erythema/ecchymoses/petechiae/wounds/abscess/warm/dry  NEURO:  Alert, oriented    LABS:  CBC Full  -  ( 15 Oct 2020 06:30 )  WBC Count : 6.78 K/uL  RBC Count : 3.25 M/uL  Hemoglobin : 9.3 g/dL  Hematocrit : 29.2 %  Platelet Count - Automated : 585 K/uL  Mean Cell Volume : 89.8 fL  Mean Cell Hemoglobin : 28.6 pg  Mean Cell Hemoglobin Concentration : 31.8 %  Auto Neutrophil # : 5.04 K/uL  Auto Lymphocyte # : 0.83 K/uL  Auto Monocyte # : 0.41 K/uL  Auto Eosinophil # : 0.46 K/uL  Auto Basophil # : 0.02 K/uL  Auto Neutrophil % : 74.4 %  Auto Lymphocyte % : 12.2 %  Auto Monocyte % : 6.0 %  Auto Eosinophil % : 6.8 %  Auto Basophil % : 0.3 %    10-15    132<L>  |  102  |  16  ----------------------------<  74  3.2<L>   |  17<L>  |  0.64    Ca    8.3<L>      15 Oct 2020 06:30  Phos  1.8     10-15  Mg     1.9     10-15    TPro  6.5  /  Alb  2.4<L>  /  TBili  0.3  /  DBili  x   /  AST  12  /  ALT  6   /  AlkPhos  55  10-15    LIVER FUNCTIONS - ( 15 Oct 2020 06:30 )  Alb: 2.4 g/dL / Pro: 6.5 g/dL / ALK PHOS: 55 u/L / ALT: 6 u/L / AST: 12 u/L / GGT: x

## 2020-10-15 NOTE — PROGRESS NOTE ADULT - ASSESSMENT
89 year old woman with osteoporosis, HTN and left intertrochanteric fracture s/p IMN on 12/12/19 admitted for fall, failure to thrive, and dark stools with acute blood loss anemia secondary to severe PUD in setting of chronic ibuprofen use.     #Peptic ulcer disease secondary to NSAID overuse: Single large, cratered duodenal ulcer with evidence/stigmata of recent bleeding however no interventions for prevention of re-bleeding performed due to perceived risk of small bowel perforation.  #History of L. intertrochanteric fracture s/p IMN 12/2019 with chronic hip pain requiring NSAID overuse   #Underweight with BMI <19.0  #Hypertension    Recommendations:  - can switch from protonix gtt to protonix 40 mg IV bid  - can advance from CLD to full liquid for lunch, and if tolerated to regular diet for dinner  - continue monitoring of Hb on serial CBCs and bowel movements to assess for rebleeding  - as patient remains at high risk for re-bleeding, would repeat upper endoscopy for downtrending Hb/additional pRBC transfusions/recurrent melena to attempt possible interventions  - non-NSAIDS for pain control only  - GI to follow    Lu Dennis PGY3

## 2020-10-15 NOTE — DISCHARGE NOTE PROVIDER - NSDCFUADDAPPT_GEN_ALL_CORE_FT
You will need a repeat upper endoscopy 6 weeks after your discharge. Follow up at the [facility] listed. You will need a repeat upper endoscopy 6 weeks after your discharge and your medications will be adjusted as appropriate after subacute rehab. Follow up at the [facility].

## 2020-10-15 NOTE — PROGRESS NOTE ADULT - SUBJECTIVE AND OBJECTIVE BOX
PROGRESS NOTE:   Authored by Sharron Almeida MD  Pager: [facility] [phone]; [facility] [phone]    Patient is a 89y old  Female who presents with a chief complaint of Fall (14 Oct 2020 10:07)      SUBJECTIVE / OVERNIGHT EVENTS:    ADDITIONAL REVIEW OF SYSTEMS:  CONSTITUTIONAL: No weakness, fevers or chills  EYES/ENT: No visual changes;  No vertigo or throat pain   NECK: No pain or stiffness  RESPIRATORY: No cough, wheezing, hemoptysis; No shortness of breath  CARDIOVASCULAR: No chest pain or palpitations  GASTROINTESTINAL: No abdominal pain; nausea, vomiting;  diarrhea or constipation. No hemetemesis, melena or hematochezia.  GENITOURINARY: No dysuria, frequency or hematuria  NEUROLOGICAL: No numbness or weakness  SKIN: No itching, burning, rashes, or lesions     MEDICATIONS  (STANDING):  amLODIPine   Tablet 10 milliGRAM(s) Oral daily  brimonidine 0.2% Ophthalmic Solution 1 Drop(s) Both EYES two times a day  influenza   Vaccine 0.5 milliLiter(s) IntraMuscular once  lisinopril 40 milliGRAM(s) Oral daily  multivitamin/minerals/iron Oral Solution (CENTRUM) 15 milliLiter(s) Oral daily  pantoprazole Infusion 8 mG/Hr (10 mL/Hr) IV Continuous <Continuous>  polyethylene glycol 3350 17 Gram(s) Oral daily  sucralfate 1 Gram(s) Oral four times a day  timolol 0.5% Solution 1 Drop(s) Both EYES two times a day    MEDICATIONS  (PRN):  acetaminophen   Tablet .. 650 milliGRAM(s) Oral every 6 hours PRN Temp greater or equal to 38C (100.4F), Mild Pain (1 - 3)      CAPILLARY BLOOD GLUCOSE        I&O's Summary      PHYSICAL EXAM:  Vital Signs Last 24 Hrs  T(C): 36.8 (15 Oct 2020 05:41), Max: 36.8 (15 Oct 2020 05:41)  T(F): 98.2 (15 Oct 2020 05:41), Max: 98.2 (15 Oct 2020 05:41)  HR: 58 (15 Oct 2020 05:41) (58 - 66)  BP: 139/55 (15 Oct 2020 05:41) (111/47 - 139/55)  BP(mean): --  RR: 18 (15 Oct 2020 05:41) (18 - 18)  SpO2: 100% (15 Oct 2020 05:41) (100% - 100%)    CONSTITUTIONAL: NAD, well-developed  RESPIRATORY: Normal respiratory effort; lungs are clear to auscultation bilaterally  CARDIOVASCULAR: Regular rate and rhythm, normal S1 and S2, no murmur/rub/gallop; No lower extremity edema; Peripheral pulses are 2+ bilaterally  ABDOMEN: Nontender to palpation, normoactive bowel sounds, no rebound/guarding; No hepatosplenomegaly  MUSCLOSKELETAL: no clubbing or cyanosis of digits; no joint swelling or tenderness to palpation  PSYCH: A+O to person, place, and time; affect appropriate    LABS:                        9.6    6.16  )-----------( 623      ( 14 Oct 2020 17:08 )             30.0     10-14    138  |  104  |  27<H>  ----------------------------<  81  3.5   |  21<L>  |  0.78    Ca    9.1      14 Oct 2020 06:24  Phos  2.1     10-14  Mg     2.2     10-14      PT/INR - ( 13 Oct 2020 07:40 )   PT: 13.7 SEC;   INR: 1.20          PTT - ( 13 Oct 2020 07:40 )  PTT:28.4 SEC            RADIOLOGY & ADDITIONAL TESTS:  Results Reviewed:   Imaging Personally Reviewed:  Electrocardiogram Personally Reviewed:    COORDINATION OF CARE:  Care Discussed with Consultants/Other Providers [Y/N]:  Prior or Outpatient Records Reviewed [Y/N]:   PROGRESS NOTE:   Authored by Sharron Almeida MD  Pager: [facility] [phone]; [facility] [phone]    Patient is a 89y old  Female who presents with a chief complaint of Fall (14 Oct 2020 10:07)      SUBJECTIVE / OVERNIGHT EVENTS: overnight no acute events. Pt continues with intermittent sharp Left hip pain that lasts less than minute, has not taken PRN pain medications, Pt mostly lying in bed. She denies having any BM or melena. Endorses improved appetite and desire to eat more, currently on clear liquid diet. Denies SOB, cough, fever/chills.    ADDITIONAL REVIEW OF SYSTEMS:  CONSTITUTIONAL: No weakness, fevers or chills  EYES/ENT: No visual changes;  No vertigo or throat pain   NECK: No pain or stiffness  RESPIRATORY: No cough, wheezing, hemoptysis; No shortness of breath  CARDIOVASCULAR: No chest pain or palpitations  GASTROINTESTINAL: No abdominal pain; nausea, vomiting;  diarrhea or constipation. No hemetemesis, melena or hematochezia.  GENITOURINARY: No dysuria, frequency or hematuria  MSK: + L hip pain  NEUROLOGICAL: + numbness and weakness in LLE  SKIN: No itching, burning, rashes, or lesions     MEDICATIONS  (STANDING):  amLODIPine   Tablet 10 milliGRAM(s) Oral daily  brimonidine 0.2% Ophthalmic Solution 1 Drop(s) Both EYES two times a day  influenza   Vaccine 0.5 milliLiter(s) IntraMuscular once  lisinopril 40 milliGRAM(s) Oral daily  multivitamin/minerals/iron Oral Solution (CENTRUM) 15 milliLiter(s) Oral daily  pantoprazole Infusion 8 mG/Hr (10 mL/Hr) IV Continuous <Continuous>  polyethylene glycol 3350 17 Gram(s) Oral daily  sucralfate 1 Gram(s) Oral four times a day  timolol 0.5% Solution 1 Drop(s) Both EYES two times a day    MEDICATIONS  (PRN):  acetaminophen   Tablet .. 650 milliGRAM(s) Oral every 6 hours PRN Temp greater or equal to 38C (100.4F), Mild Pain (1 - 3)      CAPILLARY BLOOD GLUCOSE        I&O's Summary      PHYSICAL EXAM:  Vital Signs Last 24 Hrs  T(C): 36.8 (15 Oct 2020 05:41), Max: 36.8 (15 Oct 2020 05:41)  T(F): 98.2 (15 Oct 2020 05:41), Max: 98.2 (15 Oct 2020 05:41)  HR: 58 (15 Oct 2020 05:41) (58 - 66)  BP: 139/55 (15 Oct 2020 05:41) (111/47 - 139/55)  BP(mean): --  RR: 18 (15 Oct 2020 05:41) (18 - 18)  SpO2: 100% (15 Oct 2020 05:41) (100% - 100%)    CONSTITUTIONAL: NAD, well-developed  RESPIRATORY: Normal respiratory effort; lungs are clear to auscultation bilaterally  CARDIOVASCULAR: Regular rate and rhythm, normal S1 and S2, no murmur/rub/gallop; No lower extremity edema; Peripheral pulses are 2+ bilaterally  ABDOMEN: Nontender to palpation, normoactive bowel sounds, no rebound/guarding; No hepatosplenomegaly  MUSCLOSKELETAL: no clubbing or cyanosis of digits; no joint swelling or tenderness to palpation  PSYCH: A+O to person, place, and time; affect appropriate    LABS:                        9.6    6.16  )-----------( 623      ( 14 Oct 2020 17:08 )             30.0     10-14    138  |  104  |  27<H>  ----------------------------<  81  3.5   |  21<L>  |  0.78    Ca    9.1      14 Oct 2020 06:24  Phos  2.1     10-14  Mg     2.2     10-14      PT/INR - ( 13 Oct 2020 07:40 )   PT: 13.7 SEC;   INR: 1.20          PTT - ( 13 Oct 2020 07:40 )  PTT:28.4 SEC            RADIOLOGY & ADDITIONAL TESTS:  Results Reviewed:   Imaging Personally Reviewed:  Electrocardiogram Personally Reviewed:    COORDINATION OF CARE:  Care Discussed with Consultants/Other Providers [Y/N]: PT, OT, GI service  Prior or Outpatient Records Reviewed [Y/N]:

## 2020-10-15 NOTE — SWALLOW BEDSIDE ASSESSMENT ADULT - COMMENTS
Progress Note 10/13/20: 89y/o F with h/o osteoporosis, HTN and left intertrochanteric fracture s/p IMN on 12/12/19 p/w fall/FTT found to have avascular necrosis of the hip and GIB    Clinical bedside swallow evaluation order received. Per chart review, patient tentatively scheduled for EGD 10/14/20. As per discussion with [facility] Physician, clinical bedside swallow evaluation order to be discontinued and to reconsult this service when patient is cleared for PO trials.
Progress Note 10/15/20: 89y/o F with h/o osteoporosis, HTN and left intertrochanteric fracture s/p IMN on 12/12/19 p/w fall/FTT found to have avascular necrosis of the hip and GIB 2/2 multiple duodenal ulcers in setting of NSAID overuse.    CTH 10/12/20: No acute intracranial hemorrhage, mass effect or midline shift. No displaced calvarial fracture. Age-appropriate involutional and ischemic gliotic changes.    GI Note 10/15/20: can advance from CLD to full liquid for lunch, and if tolerated to regular diet for dinner    Patient was seen upright at bedside. Patient was alert/awake and verbally responsive to simple questions. Patient able to follow simple directions. Patient denies oropharyngeal dysphagia symptoms upon questioning.

## 2020-10-15 NOTE — PROGRESS NOTE ADULT - PROBLEM SELECTOR PLAN 7
VTE ppx: mechanical SCD only due to GIB  Diet: clear liquid diet  -appreciate dietician recs  Dispo planning: PT and OT rec rehab VTE ppx: mechanical SCD only due to GIB  Diet: cld - adv to fld then regular as tolerated  -appreciate dietician recs  Dispo planning: PT and OT rec rehab

## 2020-10-15 NOTE — PROGRESS NOTE ADULT - PROBLEM SELECTOR PLAN 2
endorses epigastric pain, poor appetite, heavy NSAIDs use for L hip pain, occult blood. baseline hgb 9-10 on last admission. received 1u prbc 10/12, +normocytic anemia, high ferritin and thrombocytosis in setting of normal iron studies suggest anemia of chronic inflammation or CKD.  -appreciate GI recs  -egd found large duodenal ulcer 40mm diameter likely source of melena and two smaller duodenal ulcers <8mm. No biopsies taken due to risk of bowel perforation  -follow H pylori stool Ag  -tx sulcrafate qid, pantoprazole IV infusion for 72hrs, clear liquid diet, manage pain with non-NSAIDs  -maintain type and screen  -monitor daily CBC, Hb improving and pt remains stable endorses epigastric pain, poor appetite, heavy NSAIDs use for L hip pain, occult blood. baseline hgb 9-10 on last admission. received 1u prbc 10/12, +normocytic anemia, high ferritin and thrombocytosis in setting of normal iron studies suggest anemia of chronic inflammation or CKD.  -appreciate GI recs  -egd found large duodenal ulcer 40mm diameter likely source of melena and two smaller duodenal ulcers <8mm. No biopsies taken due to risk of bowel perforation  -follow H pylori stool Ag  -tx sulcrafate qid, pantoprazole IV infusion for 72hrs, manage pain with non-NSAIDs  -maintain type and screen  -monitor daily CBC, Hb improving and pt remains stable  -advance as tolerated to full liquid diet then regular

## 2020-10-15 NOTE — DISCHARGE NOTE PROVIDER - HOSPITAL COURSE
Ms. Arauz is a 89y/o F with h/o osteoporosis, HTN and left intertrochanteric fracture s/p IMN on 12/12/19 p/w fall. Pt reports having recent pain in her left hip and associated numbness and weakness in the area. She describes falling ~1 wk ago with facial trauma, denies LOC, but does not recall further details. She also endorses feeling cold, decreased PO intake, decreased sense of taste and fatigue in recent weeks. Collateral obtained from pt's daughter (Isela) and granddaughter whom she lives with (April): Pt has been having poor PO intake and diffuse weakness/not wanting to move for the past two months. Last week she had a fall while standing. She walked out of the bathroom with her cane, but dropped it and subsequently fell forward. Granddaughter reports pt was at normal mental status after the fall and was able to be helped up. Family also reports intermittent episodes of loose stool and recent dark/black stool. Since the recent fall pt has been less mobile and family recently purchased a bedpan for her. Regarding substance use, Pt endorses drinking 1 beer with dinner and 1 shot of bourbon or vodka at night a few times a week, for the past 70 years. Denies h/o and current alcohol withdrawal Sx, DT/seizures. Does not use illicit substances.     During this hospitalization, Pt was evaluated by the orthopedics and GI service. Noncontrast CT pelvis show underlying avascular necrosis of the left femoral head. No acute intervention at this time, recommend follow-up with Dr. Durán as outpatient. Pain improved with bedrest and was managed with Tylenol as needed. PT and OT recommended rehab. Pt was found with normocytic anemia, high ferritin and thrombocytosis in setting of normal iron studies suggestive of anemia of chronic inflammation or CKD. She received 1U PRBC on 10/12, returned to baseline hgb 9-10 as per last admission. Upper endoscopy was completed, showed large duodenal ulcer 40mm diameter likely source of melena and two smaller duodenal ulcers <8mm. No biopsies taken due to risk of bowel perforation. Monitored closely due to high risk of re-bleeding. Pt was treated with sulcrafate qid, pantoprazole IV. CBC was monitored daily. Pending H pylori stool Ag. Dietician and speech and swallow eval were done to assess patient's severe protein malnutrition and to assess dysphagia. Diet was adjusted accordingly with aspiration precautions. Patient should follow up with PMD, Ortho, and GI as outpatient       10/12/20 Noncontrast CT Bony Pelvis  FINDINGS: The bones are demineralized.    The patient is status post prior ORIF of the left hip. The femoral neck screw has protruded beyond the confines of the femoral head across the joint into the acetabulum with erosive change and osseous resorption around the screw within the acetabulum. There is curvilinear lucency and surrounding sclerosis within the femoral head consistent with underlying avascular necrosis. There is lucency within the femoral head and neck surrounding the screw compatible with screw loosening/osseous resorptive change. A small portion of the previously noted intertrochanteric fracture line is still visualized. However there is bony bridging across the majority of the fracture site.    There is no right hip fracture identified.    There is moderate degenerative change of the pubic symphysis. Lower lumbar facet arthrosis and degenerative disc disease is present.    Axial calcification is present.    IMPRESSION:    Migration of the femoral neck screw within the left hip beyond the confines of the femoral head and across the joint space into the acetabulum with adjacent erosive and osseous resorptivechange. Underlying avascular necrosis of the left femoral head.    No acute fracture.   Ms. Arauz is a 89y/o F with h/o osteoporosis, HTN and left intertrochanteric fracture s/p IMN on 12/12/19 p/w fall. Pt reports having recent pain in her left hip and associated numbness and weakness in the area, treated with daily ibuprofen use. She describes falling ~1 wk ago with facial trauma, denies LOC, but does not recall further details. She also endorses feeling cold, decreased PO intake, decreased sense of taste and fatigue in recent weeks. Collateral obtained from pt's daughter (Isela) and granddaughter whom she lives with (April): Pt has been having poor PO intake and diffuse weakness/not wanting to move for the past two months. Last week she had a fall while standing. She walked out of the bathroom with her cane, but dropped it and subsequently fell forward. Granddaughter reports pt was at normal mental status after the fall and was able to be helped up. Family also reports intermittent episodes of loose stool and recent dark/black stool. Since the recent fall pt has been less mobile and family recently purchased a bedpan for her. Regarding substance use, Pt endorses drinking 1 beer with dinner and 1 shot of bourbon or vodka at night a few times a week, for the past 70 years. Denies h/o and current alcohol withdrawal Sx, DT/seizures. Does not use illicit substances.     During this hospitalization, Pt was evaluated by the orthopedics and GI service. Noncontrast CT pelvis show underlying avascular necrosis of the left femoral head. No acute intervention at this time, recommend follow-up with Dr. Durán as outpatient. Pain improved with bedrest and was managed with Tylenol as needed. PT and OT recommended rehab. Pt was found with normocytic anemia, high ferritin and thrombocytosis in setting of normal iron studies suggestive of anemia of chronic inflammation or CKD from chronic NSAID use. She received 1U PRBC on 10/12, returned to baseline hgb 9-10 as per last admission. Endoscopy showed large duodenal ulcer 40mm diameter likely source of melena and two smaller duodenal ulcers <8mm. No biopsies taken due to risk of bowel perforation. Monitored closely due to high risk of re-bleeding. VSS with stable physical findings, no signs of active bleeding. Denied dizziness, confusion, and weakness. Pt was treated with sulcrafate qid, pantoprazole infusion, IV and PO. CBC was monitored daily. Pt did not have BM during hospitalization, no melena or diarrhea. No abd pain, obstipation, or other signs of obstruction. Dietician and speech and swallow eval were done to assess patient's severe protein malnutrition, BMI <18, and dysphagia. Diet was adjusted accordingly with aspiration precautions. Patient will discharge to a subacute rehab, and should follow up with PMD, Ortho,  GI, and dietician as outpatient.      10/12/20 Noncontrast CT Bony Pelvis  FINDINGS: The bones are demineralized.    The patient is status post prior ORIF of the left hip. The femoral neck screw has protruded beyond the confines of the femoral head across the joint into the acetabulum with erosive change and osseous resorption around the screw within the acetabulum. There is curvilinear lucency and surrounding sclerosis within the femoral head consistent with underlying avascular necrosis. There is lucency within the femoral head and neck surrounding the screw compatible with screw loosening/osseous resorptive change. A small portion of the previously noted intertrochanteric fracture line is still visualized. However there is bony bridging across the majority of the fracture site.    There is no right hip fracture identified.    There is moderate degenerative change of the pubic symphysis. Lower lumbar facet arthrosis and degenerative disc disease is present.    Axial calcification is present.    IMPRESSION:    Migration of the femoral neck screw within the left hip beyond the confines of the femoral head and across the joint space into the acetabulum with adjacent erosive and osseous resorptivechange. Underlying avascular necrosis of the left femoral head.    No acute fracture.      < from: Upper Endoscopy (10.13.20 @ 13:18) >    Findings:       The Z-line was regular and was found 36 cm from the incisors.       One linear esophageal ulcer with no bleeding and no stigmata of recent        bleeding was found 36 cm from the incisors.       The exam of the esophagus was otherwise normal.       The entire examined stomach was normal.       One large, non-bleeding, very cratered duodenal ulcer with a flat        pigmented spot (Chuy Class IIc) was found in the duodenal bulb. The        lesion was approximately 40 mm in largest dimension. Interventions for        prevention of re-bleeding were not performed due to perceived risk of        perforation in setting of severely ulcerated small bowel wall.       Two non-bleeding cratered duodenal ulcers with a clean ulcer base        (Chuy Class III) were found in the first portion of the duodenum. The        largest lesion was approximately 8 mm in largest dimension.       The second portion of the duodenum was normal.                                   Impression:          - Z-line regular, 36 cm from the incisors.                       - Single, ulcerated, non-bleeding esophageal ulcer at                        the GE junction.                       - Normal stomach.                       - One non-bleeding, very large ulcer in the duodenal                        bulb with a flat pigmented spot (Chuy Class IIc).                        This is the likely cause of melena.                       - Two non-bleeding, ulcers in the 1st part of the                        duodenum with a clean ulcer base (Chuy Class III).                       - No specimens collected.    < end of copied text >     Ms. Arauz is a 89y/o F with h/o osteoporosis, HTN and left intertrochanteric fracture s/p IMN on 12/12/19 p/w fall. Pt reports having recent pain in her left hip and associated numbness and weakness in the area, treated with daily ibuprofen use. She describes falling ~1 wk ago with facial trauma, denies LOC, but does not recall further details. She also endorses feeling cold, decreased PO intake, decreased sense of taste and fatigue in recent weeks. Collateral obtained from pt's daughter (Isela) and granddaughter whom she lives with (April): Pt has been having poor PO intake and diffuse weakness/not wanting to move for the past two months. Last week she had a fall while standing. She walked out of the bathroom with her cane, but dropped it and subsequently fell forward. Granddaughter reports pt was at normal mental status after the fall and was able to be helped up. Family also reports intermittent episodes of loose stool and recent dark/black stool. Since the recent fall pt has been less mobile and family recently purchased a bedpan for her. Regarding substance use, Pt endorses drinking 1 beer with dinner and 1 shot of bourbon or vodka at night a few times a week, for the past 70 years. Denies h/o and current alcohol withdrawal Sx, DT/seizures. Does not use illicit substances.     During this hospitalization, Pt was evaluated by the orthopedics and GI service. Noncontrast CT pelvis show underlying avascular necrosis of the left femoral head. No acute intervention at this time, recommend follow-up with Dr. Durán as outpatient. Pain improved with bedrest and was managed with Tylenol as needed. PT and OT recommended rehab. Pt was found with normocytic anemia, high ferritin and thrombocytosis in setting of normal iron studies suggestive of anemia of chronic inflammation or CKD from chronic NSAID use. She received 1U PRBC on 10/12, returned to baseline hgb 9-10 as per last admission. Endoscopy showed large duodenal ulcer 40mm diameter likely source of melena and two smaller duodenal ulcers <8mm. No biopsies taken due to risk of bowel perforation. Monitored closely due to high risk of re-bleeding. VSS with stable physical findings, no signs of active bleeding. Denied dizziness, confusion, and weakness. Pt was treated with sulcrafate qid, pantoprazole infusion, IV and PO. CBC was monitored daily. Pt had BM during hospitalization, no melena or diarrhea. No abd pain, obstipation, or other signs of obstruction. Dietician and speech and swallow eval were done to assess patient's severe protein malnutrition, BMI <18, and dysphagia. Diet was adjusted accordingly with aspiration precautions. Patient will discharge to a subacute rehab, and should follow up with PMD, Ortho, GI, and dietician as outpatient. F/u EGD in 6 weeks post d/c.      10/12/20 Noncontrast CT Bony Pelvis  FINDINGS: The bones are demineralized.    The patient is status post prior ORIF of the left hip. The femoral neck screw has protruded beyond the confines of the femoral head across the joint into the acetabulum with erosive change and osseous resorption around the screw within the acetabulum. There is curvilinear lucency and surrounding sclerosis within the femoral head consistent with underlying avascular necrosis. There is lucency within the femoral head and neck surrounding the screw compatible with screw loosening/osseous resorptive change. A small portion of the previously noted intertrochanteric fracture line is still visualized. However there is bony bridging across the majority of the fracture site.    There is no right hip fracture identified.    There is moderate degenerative change of the pubic symphysis. Lower lumbar facet arthrosis and degenerative disc disease is present.    Axial calcification is present.    IMPRESSION:    Migration of the femoral neck screw within the left hip beyond the confines of the femoral head and across the joint space into the acetabulum with adjacent erosive and osseous resorptivechange. Underlying avascular necrosis of the left femoral head.    No acute fracture.      < from: Upper Endoscopy (10.13.20 @ 13:18) >    Findings:       The Z-line was regular and was found 36 cm from the incisors.       One linear esophageal ulcer with no bleeding and no stigmata of recent        bleeding was found 36 cm from the incisors.       The exam of the esophagus was otherwise normal.       The entire examined stomach was normal.       One large, non-bleeding, very cratered duodenal ulcer with a flat        pigmented spot (Chuy Class IIc) was found in the duodenal bulb. The        lesion was approximately 40 mm in largest dimension. Interventions for        prevention of re-bleeding were not performed due to perceived risk of        perforation in setting of severely ulcerated small bowel wall.       Two non-bleeding cratered duodenal ulcers with a clean ulcer base        (Chuy Class III) were found in the first portion of the duodenum. The        largest lesion was approximately 8 mm in largest dimension.       The second portion of the duodenum was normal.                                   Impression:          - Z-line regular, 36 cm from the incisors.                       - Single, ulcerated, non-bleeding esophageal ulcer at                        the GE junction.                       - Normal stomach.                       - One non-bleeding, very large ulcer in the duodenal                        bulb with a flat pigmented spot (Chuy Class IIc).                        This is the likely cause of melena.                       - Two non-bleeding, ulcers in the 1st part of the                        duodenum with a clean ulcer base (Chuy Class III).                       - No specimens collected.    < end of copied text >     Ms. Arauz is a 89y/o F with h/o osteoporosis, HTN and left intertrochanteric fracture s/p IMN on 12/12/19 p/w fall. Pt reports having recent pain in her left hip and associated numbness and weakness in the area, treated with daily ibuprofen use. She describes falling ~1 wk ago with facial trauma, denies LOC, but does not recall further details. She also endorses feeling cold, decreased PO intake, decreased sense of taste and fatigue in recent weeks. Collateral obtained from pt's daughter (Isela) and granddaughter whom she lives with (April): Pt has been having poor PO intake and diffuse weakness/not wanting to move for the past two months. Last week she had a fall while standing. She walked out of the bathroom with her cane, but dropped it and subsequently fell forward. Granddaughter reports pt was at normal mental status after the fall and was able to be helped up. Family also reports intermittent episodes of loose stool and recent dark/black stool. Since the recent fall pt has been less mobile and family recently purchased a bedpan for her. Regarding substance use, Pt endorses drinking 1 beer with dinner and 1 shot of bourbon or vodka at night a few times a week, for the past 70 years. Denies h/o and current alcohol withdrawal Sx, DT/seizures. Does not use illicit substances.     During this hospitalization, Pt was evaluated by the orthopedics and GI service. Noncontrast CT pelvis show underlying avascular necrosis of the left femoral head. No acute intervention at this time, recommend follow-up with Dr. Durán as outpatient. Pain improved with bedrest and was managed with Tylenol as needed. PT and OT recommended rehab. Pt was found with normocytic anemia, high ferritin and thrombocytosis in setting of normal iron studies suggestive of anemia of chronic inflammation or CKD from chronic NSAID use. She received 1U PRBC on 10/12, returned to baseline hgb 9-10 as per last admission. Endoscopy showed large duodenal ulcer 40mm diameter likely source of melena and two smaller duodenal ulcers <8mm. No biopsies taken due to risk of bowel perforation. Monitored closely, no signs of re-bleeding observed. VSS with stable physical findings, no signs of active bleeding. Denied dizziness, confusion, and weakness. Pt was treated with sulcrafate qid, pantoprazole infusion, IV and PO. CBC was monitored daily. Pt had one large BM during hospitalization, no melena or diarrhea. No abd pain, obstipation, or other signs of obstruction. Dietician and speech and swallow eval were done to assess patient's severe protein malnutrition, BMI <18, and dysphagia. Diet was adjusted accordingly with aspiration precautions. Patient will discharge to a subacute rehab, and should follow up with PMD, Ortho, GI, and dietician as outpatient. Instructed to continue Protonix 40 mg PO BID; should be continued for 8 weeks and then transition to daily. F/u EGD in 6 weeks post d/c.     Patient had low sodium to 128 during admission, likely due to hypovolemic hyponatremia, remained ASx, monitored closely.       10/12/20 Noncontrast CT Bony Pelvis  FINDINGS: The bones are demineralized.    The patient is status post prior ORIF of the left hip. The femoral neck screw has protruded beyond the confines of the femoral head across the joint into the acetabulum with erosive change and osseous resorption around the screw within the acetabulum. There is curvilinear lucency and surrounding sclerosis within the femoral head consistent with underlying avascular necrosis. There is lucency within the femoral head and neck surrounding the screw compatible with screw loosening/osseous resorptive change. A small portion of the previously noted intertrochanteric fracture line is still visualized. However there is bony bridging across the majority of the fracture site.    There is no right hip fracture identified.    There is moderate degenerative change of the pubic symphysis. Lower lumbar facet arthrosis and degenerative disc disease is present.    Axial calcification is present.    IMPRESSION:    Migration of the femoral neck screw within the left hip beyond the confines of the femoral head and across the joint space into the acetabulum with adjacent erosive and osseous resorptivechange. Underlying avascular necrosis of the left femoral head.    No acute fracture.      < from: Upper Endoscopy (10.13.20 @ 13:18) >    Findings:       The Z-line was regular and was found 36 cm from the incisors.       One linear esophageal ulcer with no bleeding and no stigmata of recent        bleeding was found 36 cm from the incisors.       The exam of the esophagus was otherwise normal.       The entire examined stomach was normal.       One large, non-bleeding, very cratered duodenal ulcer with a flat        pigmented spot (Chuy Class IIc) was found in the duodenal bulb. The        lesion was approximately 40 mm in largest dimension. Interventions for        prevention of re-bleeding were not performed due to perceived risk of        perforation in setting of severely ulcerated small bowel wall.       Two non-bleeding cratered duodenal ulcers with a clean ulcer base        (Chuy Class III) were found in the first portion of the duodenum. The        largest lesion was approximately 8 mm in largest dimension.       The second portion of the duodenum was normal.                                   Impression:          - Z-line regular, 36 cm from the incisors.                       - Single, ulcerated, non-bleeding esophageal ulcer at                        the GE junction.                       - Normal stomach.                       - One non-bleeding, very large ulcer in the duodenal                        bulb with a flat pigmented spot (Chuy Class IIc).                        This is the likely cause of melena.                       - Two non-bleeding, ulcers in the 1st part of the                        duodenum with a clean ulcer base (Chuy Class III).                       - No specimens collected.    < end of copied text >     Ms. Arauz is a 89y/o F with h/o osteoporosis, HTN and left intertrochanteric fracture s/p IMN on 12/12/19 p/w fall. Pt reports having recent pain in her left hip and associated numbness and weakness in the area, treated with daily ibuprofen use. She describes falling ~1 wk ago with facial trauma, denies LOC, but does not recall further details. She also endorses feeling cold, decreased PO intake, decreased sense of taste and fatigue in recent weeks. Collateral obtained from pt's daughter (Isela) and granddaughter whom she lives with (April): Pt has been having poor PO intake and diffuse weakness/not wanting to move for the past two months. Last week she had a fall while standing. She walked out of the bathroom with her cane, but dropped it and subsequently fell forward. Granddaughter reports pt was at normal mental status after the fall and was able to be helped up. Family also reports intermittent episodes of loose stool and recent dark/black stool. Since the recent fall pt has been less mobile and family recently purchased a bedpan for her. Regarding substance use, Pt endorses drinking 1 beer with dinner and 1 shot of bourbon or vodka at night a few times a week, for the past 70 years. Denies h/o and current alcohol withdrawal Sx, DT/seizures. Does not use illicit substances.     During this hospitalization, Pt was evaluated by the orthopedics and GI service. Noncontrast CT pelvis show underlying avascular necrosis of the left femoral head. No acute intervention at this time, recommend follow-up with Dr. Durán as outpatient. Pain improved with bedrest and was managed with Tylenol as needed. PT and OT recommended subacute rehab. Pt was found with normocytic anemia, high ferritin and thrombocytosis in setting of normal iron studies suggestive of anemia of chronic inflammation or CKD from chronic NSAID use. She received 1U PRBC on 10/12, returned to baseline hgb 9-10 as per last admission. Endoscopy showed large duodenal ulcer 40mm diameter likely source of melena and two smaller duodenal ulcers <8mm. No biopsies taken due to risk of bowel perforation. Monitored closely, no signs of re-bleeding observed. VSS with stable physical findings, no signs of active bleeding. Denied dizziness, confusion, and weakness. Pt was treated with sulcrafate qid, pantoprazole infusion, IV and PO. CBC was monitored daily. Pt had one large BM during hospitalization, no melena or diarrhea. No abd pain, obstipation, or other signs of obstruction. Dietician and speech and swallow eval were done to assess patient's severe protein malnutrition, BMI <18, and dysphagia. Diet was adjusted accordingly with aspiration precautions. Patient will discharge to a subacute rehab, and should follow up with PMD, Ortho, GI, and dietician as outpatient. Instructed to continue sulcrafate 1g BID and Protonix 40 mg PO BID. Protonix should be continued for 8 weeks and then transition to daily. F/u EGD in 6 weeks post d/c. Patient had low sodium to 128 during admission, likely due to hypovolemic hyponatremia, remained ASx, monitored closely, resolved at discharge.     Dispo to subacute rehab, COVID PCR neg.      10/12/20 Noncontrast CT Bony Pelvis  FINDINGS: The bones are demineralized.    The patient is status post prior ORIF of the left hip. The femoral neck screw has protruded beyond the confines of the femoral head across the joint into the acetabulum with erosive change and osseous resorption around the screw within the acetabulum. There is curvilinear lucency and surrounding sclerosis within the femoral head consistent with underlying avascular necrosis. There is lucency within the femoral head and neck surrounding the screw compatible with screw loosening/osseous resorptive change. A small portion of the previously noted intertrochanteric fracture line is still visualized. However there is bony bridging across the majority of the fracture site.    There is no right hip fracture identified.    There is moderate degenerative change of the pubic symphysis. Lower lumbar facet arthrosis and degenerative disc disease is present.    Axial calcification is present.    IMPRESSION:    Migration of the femoral neck screw within the left hip beyond the confines of the femoral head and across the joint space into the acetabulum with adjacent erosive and osseous resorptivechange. Underlying avascular necrosis of the left femoral head.    No acute fracture.      < from: Upper Endoscopy (10.13.20 @ 13:18) >    Findings:       The Z-line was regular and was found 36 cm from the incisors.       One linear esophageal ulcer with no bleeding and no stigmata of recent        bleeding was found 36 cm from the incisors.       The exam of the esophagus was otherwise normal.       The entire examined stomach was normal.       One large, non-bleeding, very cratered duodenal ulcer with a flat        pigmented spot (Chuy Class IIc) was found in the duodenal bulb. The        lesion was approximately 40 mm in largest dimension. Interventions for        prevention of re-bleeding were not performed due to perceived risk of        perforation in setting of severely ulcerated small bowel wall.       Two non-bleeding cratered duodenal ulcers with a clean ulcer base        (Chuy Class III) were found in the first portion of the duodenum. The        largest lesion was approximately 8 mm in largest dimension.       The second portion of the duodenum was normal.                                   Impression:          - Z-line regular, 36 cm from the incisors.                       - Single, ulcerated, non-bleeding esophageal ulcer at                        the GE junction.                       - Normal stomach.                       - One non-bleeding, very large ulcer in the duodenal                        bulb with a flat pigmented spot (Chuy Class IIc).                        This is the likely cause of melena.                       - Two non-bleeding, ulcers in the 1st part of the                        duodenum with a clean ulcer base (Chuy Class III).                       - No specimens collected.    < end of copied text >     Ms. Arauz is a 89y/o F with h/o osteoporosis, HTN and left intertrochanteric fracture s/p IMN on 12/12/19 p/w fall. Pt reports having recent pain in her left hip and associated numbness and weakness in the area, treated with daily ibuprofen use. She describes falling ~1 wk ago with facial trauma, denies LOC, but does not recall further details. She also endorses feeling cold, decreased PO intake, decreased sense of taste and fatigue in recent weeks. Collateral obtained from pt's daughter (Isela) and granddaughter whom she lives with (April): Pt has been having poor PO intake and diffuse weakness/not wanting to move for the past two months. Last week she had a fall while standing. She walked out of the bathroom with her cane, but dropped it and subsequently fell forward. Granddaughter reports pt was at normal mental status after the fall and was able to be helped up. Family also reports intermittent episodes of loose stool and recent dark/black stool. Since the recent fall pt has been less mobile and family recently purchased a bedpan for her. Regarding substance use, Pt endorses drinking 1 beer with dinner and 1 shot of bourbon or vodka at night a few times a week, for the past 70 years. Denies h/o and current alcohol withdrawal Sx, DT/seizures. Does not use illicit substances.     During this hospitalization, Pt was evaluated by the orthopedics and GI service. Noncontrast CT pelvis show underlying avascular necrosis of the left femoral head. No acute intervention at this time, recommend follow-up with Dr. Durán as outpatient. Pain improved with bedrest and was managed with Tylenol as needed. PT and OT recommended subacute rehab. Pt was found with normocytic anemia, high ferritin and thrombocytosis in setting of normal iron studies suggestive of anemia of chronic inflammation or CKD from chronic NSAID use. She received 1U PRBC on 10/12, returned to baseline hgb 9-10 as per last admission. Endoscopy showed large duodenal ulcer 40mm diameter likely source of melena and two smaller duodenal ulcers <8mm. No biopsies taken due to risk of bowel perforation. Monitored closely, no signs of re-bleeding observed. VSS with stable physical findings, no signs of active bleeding. Denied dizziness, confusion, and weakness. Pt was treated with sulcrafate qid, pantoprazole infusion, IV and PO. CBC was monitored daily. Pt had one large BM during hospitalization, no melena or diarrhea. No abd pain, obstipation, or other signs of obstruction. Dietician and speech and swallow eval were done to assess patient's severe protein malnutrition, BMI <18, and dysphagia. Diet was adjusted accordingly with aspiration precautions. Patient will discharge to a subacute rehab, and should follow up with PMD, Ortho, GI, and dietician as outpatient. Instructed to continue sulcrafate 1g BID and Protonix 40 mg PO BID. Protonix should be continued for 8 weeks and then transition to daily. F/u EGD in 6 weeks post d/c. Patient had low sodium to 128 during admission, likely due to hypovolemic hyponatremia, remained ASx, monitored closely, resolved at discharge.     Patient to be discharged to subacute rehab, COVID PCR neg.    IMAGING OBTAINED  10/12/20 Noncontrast CT Bony Pelvis  FINDINGS: The bones are demineralized.    The patient is status post prior ORIF of the left hip. The femoral neck screw has protruded beyond the confines of the femoral head across the joint into the acetabulum with erosive change and osseous resorption around the screw within the acetabulum. There is curvilinear lucency and surrounding sclerosis within the femoral head consistent with underlying avascular necrosis. There is lucency within the femoral head and neck surrounding the screw compatible with screw loosening/osseous resorptive change. A small portion of the previously noted intertrochanteric fracture line is still visualized. However there is bony bridging across the majority of the fracture site.    There is no right hip fracture identified.    There is moderate degenerative change of the pubic symphysis. Lower lumbar facet arthrosis and degenerative disc disease is present.    Axial calcification is present.    IMPRESSION:    Migration of the femoral neck screw within the left hip beyond the confines of the femoral head and across the joint space into the acetabulum with adjacent erosive and osseous resorptivechange. Underlying avascular necrosis of the left femoral head.    No acute fracture.      < from: Upper Endoscopy (10.13.20 @ 13:18) >    Findings:       The Z-line was regular and was found 36 cm from the incisors.       One linear esophageal ulcer with no bleeding and no stigmata of recent        bleeding was found 36 cm from the incisors.       The exam of the esophagus was otherwise normal.       The entire examined stomach was normal.       One large, non-bleeding, very cratered duodenal ulcer with a flat        pigmented spot (Chuy Class IIc) was found in the duodenal bulb. The        lesion was approximately 40 mm in largest dimension. Interventions for        prevention of re-bleeding were not performed due to perceived risk of        perforation in setting of severely ulcerated small bowel wall.       Two non-bleeding cratered duodenal ulcers with a clean ulcer base        (Chuy Class III) were found in the first portion of the duodenum. The        largest lesion was approximately 8 mm in largest dimension.       The second portion of the duodenum was normal.                                   Impression:          - Z-line regular, 36 cm from the incisors.                       - Single, ulcerated, non-bleeding esophageal ulcer at                        the GE junction.                       - Normal stomach.                       - One non-bleeding, very large ulcer in the duodenal                        bulb with a flat pigmented spot (Chuy Class IIc).                        This is the likely cause of melena.                       - Two non-bleeding, ulcers in the 1st part of the                        duodenum with a clean ulcer base (Chuy Class III).                       - No specimens collected.    < end of copied text >

## 2020-10-15 NOTE — DISCHARGE NOTE PROVIDER - NSDCMRMEDTOKEN_GEN_ALL_CORE_FT
amLODIPine 10 mg oral tablet: 1 tab(s) orally once a day  brimonidine-timolol 0.2%-0.5% ophthalmic solution: 1 drop(s) to each affected eye every 12 hours  ibuprofen 800 mg oral tablet: 1 tab(s) orally 3 times a day, As Needed  lisinopril 40 mg oral tablet: 1 tab(s) orally once a day   acetaminophen 325 mg oral tablet: 2 tab(s) orally every 6 hours, As needed, Temp greater or equal to 38C (100.4F), Mild Pain (1 - 3)  amLODIPine 10 mg oral tablet: 1 tab(s) orally once a day  brimonidine-timolol 0.2%-0.5% ophthalmic solution: 1 drop(s) to each affected eye every 12 hours  lisinopril 40 mg oral tablet: 1 tab(s) orally once a day  pantoprazole 40 mg oral delayed release tablet: 1 tab(s) orally 2 times a day  polyethylene glycol 3350 oral powder for reconstitution: 17 gram(s) orally once a day, As Needed  senna oral tablet: 2 tab(s) orally once a day (at bedtime), As Needed  sucralfate 1 g oral tablet: 1 tab(s) orally 4 times a day    acetaminophen 325 mg oral tablet: 2 tab(s) orally every 6 hours, As needed, Temp greater or equal to 38C (100.4F), Mild Pain (1 - 3), Moderate Pain (4 - 6), Severe Pain (7 - 10)  amLODIPine 10 mg oral tablet: 1 tab(s) orally once a day  brimonidine-timolol 0.2%-0.5% ophthalmic solution: 1 drop(s) to each affected eye every 12 hours  lisinopril 40 mg oral tablet: 1 tab(s) orally once a day  pantoprazole 40 mg oral delayed release tablet: 1 tab(s) orally 2 times a day  polyethylene glycol 3350 oral powder for reconstitution: 17 gram(s) orally once a day, As Needed  senna oral tablet: 2 tab(s) orally once a day (at bedtime), As Needed  sucralfate 1 g oral tablet: 1 tab(s) orally 2 times a day

## 2020-10-15 NOTE — DISCHARGE NOTE PROVIDER - NSDCCPTREATMENT_GEN_ALL_CORE_FT
PRINCIPAL PROCEDURE  Procedure: CT pelvis wo con  Findings and Treatment: 10/12/20 FINDINGS: The bones are demineralized.  The patient is status post prior ORIF of the left hip. The femoral neck screw has protruded beyond the confines of the femoral head across the joint into the acetabulum with erosive change and osseous resorption around the screw within the acetabulum. There is curvilinear lucency and surrounding sclerosis within the femoral head consistent with underlying avascular necrosis. There is lucency within the femoral head and neck surrounding the screw compatible with screw loosening/osseous resorptive change. A small portion of the previously noted intertrochanteric fracture line is still visualized. However there is bony bridging across the majority of the fracture site.  There is no right hip fracture identified.  There is moderate degenerative change of the pubic symphysis. Lower lumbar facet arthrosis and degenerative disc disease is present.  Axial calcification is present.  IMPRESSION:  Migration of the femoral neck screw within the left hip beyond the confines of the femoral head and across the joint space into the acetabulum with adjacent erosive and osseous resorptivechange. Underlying avascular necrosis of the left femoral head.  No acute fracture.         PRINCIPAL PROCEDURE  Procedure: CT pelvis wo con  Findings and Treatment: 10/12/20 FINDINGS: The bones are demineralized.  The patient is status post prior ORIF of the left hip. The femoral neck screw has protruded beyond the confines of the femoral head across the joint into the acetabulum with erosive change and osseous resorption around the screw within the acetabulum. There is curvilinear lucency and surrounding sclerosis within the femoral head consistent with underlying avascular necrosis. There is lucency within the femoral head and neck surrounding the screw compatible with screw loosening/osseous resorptive change. A small portion of the previously noted intertrochanteric fracture line is still visualized. However there is bony bridging across the majority of the fracture site.  There is no right hip fracture identified.  There is moderate degenerative change of the pubic symphysis. Lower lumbar facet arthrosis and degenerative disc disease is present.  Axial calcification is present.  IMPRESSION:  Migration of the femoral neck screw within the left hip beyond the confines of the femoral head and across the joint space into the acetabulum with adjacent erosive and osseous resorptivechange. Underlying avascular necrosis of the left femoral head.  No acute fracture.        SECONDARY PROCEDURE  Procedure: Endoscopy, UGI  Findings and Treatment: Impression:          - Z-line regular, 36 cm from the incisors.                       - Single, ulcerated, non-bleeding esophageal ulcer at                        the GE junction.                       - Normal stomach.                       - One non-bleeding, very large ulcer in the duodenal                        bulb with a flat pigmented spot (Chuy Class IIc).                        This is the likely cause of melena.                       - Two non-bleeding, ulcers in the 1st part of the                        duodenum with a clean ulcer base (Chuy Class III).                       - No specimens collected.

## 2020-10-15 NOTE — PROGRESS NOTE ADULT - ASSESSMENT
89y/o F with h/o osteoporosis, HTN and left intertrochanteric fracture s/p IMN on 12/12/19 p/w fall/FTT found to have avascular necrosis of the hip and GIB 89y/o F with h/o osteoporosis, HTN and left intertrochanteric fracture s/p IMN on 12/12/19 p/w fall/FTT found to have avascular necrosis of the hip and GIB 2/2 multiple duodenal ulcers in setting of NSAID overuse.

## 2020-10-15 NOTE — SWALLOW BEDSIDE ASSESSMENT ADULT - ASR SWALLOW ASPIRATION MONITOR
cough/fever/pneumonia/throat clearing/upper respiratory infection/change of breathing pattern/oral hygiene/position upright (89Y)/gurgly voice

## 2020-10-15 NOTE — PROGRESS NOTE ADULT - PROBLEM SELECTOR PLAN 4
eGFR (AA) of 47 on admission, 36 in Dec 2019. normocytic anemia, high ferritin and thrombocytosis in setting of normal iron studies suggest anemia of chronic inflammation or CKD.   -mgmt of GIB per above and avoid NSAIDs

## 2020-10-15 NOTE — PROGRESS NOTE ADULT - PROBLEM SELECTOR PLAN 5
in target BP, continue to monitor  -continue home meds amlodipine and lisinopril  -if diastolic BP persistently <60 and Pt Asx, stagger med timing

## 2020-10-15 NOTE — DISCHARGE NOTE PROVIDER - NSFOLLOWUPCLINICS_GEN_ALL_ED_FT
Health system General Internal Medicine  General Internal Medicine  [address] [id number]  Phone: [phone]  Fax:   Follow Up Time: 1-3 days     Gastroenterology at [facility]  Gastroenterology  [address] [place] [address]  Phone: [phone]  Fax:   Follow Up Time: 1 week    [facility] Internal Medicine  General Internal Medicine  [address] [place] [id number]  Phone: [phone]  Fax:   Follow Up Time: 1-3 days     [facility] [place]  Internal Medicine  [address] [place] [address]  Phone: [phone]  Fax: [phone]  Follow Up Time: 1 month

## 2020-10-15 NOTE — PROGRESS NOTE ADULT - PROBLEM SELECTOR PLAN 1
h/o left intertrochanteric fracture s/p left femur intramedullary nail done on 12/12/2019 by Dr. Durán. 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 Associated numbness and weakness on LLE  -appreciate ortho recs - follow up outpatient  -pain mgmt with non-NSAIDs   -PT and OT rec rehab

## 2020-10-15 NOTE — DISCHARGE NOTE PROVIDER - INSTRUCTIONS
Reduce your risk of aspiration by maintaining an upright posture (90 degrees) during and after eating for 30 mins, oral hygiene, and take small sips/bites

## 2020-10-15 NOTE — SWALLOW BEDSIDE ASSESSMENT ADULT - SWALLOW EVAL: DIAGNOSIS
Patient presents with Functional Oropharyngeal Swallow. The Oral Phase was marked by adequate stripping of bolus from utensil, adequate oral containment, mildly extended mastication for regular solids though able to breakdown bolus, adequate bolus manipulation and functional anterior to posterior transport. The Pharyngeal Phase was marked by laryngeal elevation upon digital palpation with NO overt s/s of laryngeal penetration/aspiration for puree consistency, regular solids and thin liquids.

## 2020-10-15 NOTE — DISCHARGE NOTE PROVIDER - CARE PROVIDER_API CALL
Delfino Durán)  Orthopaedic Surgery  [address] [place] [address], [facility] [address]  [place] [address]  Phone: [phone]  Fax: [phone]  Follow Up Time: 1 week

## 2020-10-15 NOTE — PROGRESS NOTE ADULT - PROBLEM SELECTOR PLAN 3
endorses poor appetite for past 2 months with loss of taste in recent weeks  -appreciate dietician recs   -speech/swallow eval for dysphagia pending GI clearance  -multivitamin solution for nutritional deficiency

## 2020-10-15 NOTE — DISCHARGE NOTE PROVIDER - NSDCCPCAREPLAN_GEN_ALL_CORE_FT
PRINCIPAL DISCHARGE DIAGNOSIS  Diagnosis: Avascular necrosis of hip, left  Assessment and Plan of Treatment: You have had left hip pain since your fall and placement of an emergency intramedullar nail in December 2019. Imaging studies show evidence of avascular necrosis in the left hip area, or lack of blood supply which contributes to the pain you are experiencing. The orthopedic team with Dr. Durán saw you and said you did not need surgical intervention at this time. Follow up with Dr. Durán in the outpatient clinic.   Physical Therapy and Occupational Therapy also evaluated you and recommended time at a rehabilation facility to build your strength. Your pain was managed with bed rest and occassional Tylenol - do NOT use NSAIDs medications as this can cause bleeding in your GI tract. Follow up with your Primary Medical Doctor within the week you are discharged. If you have any fever, worsening pain, please return to the ED immediately.      SECONDARY DISCHARGE DIAGNOSES  Diagnosis: Duodenal bulb ulcer  Assessment and Plan of Treatment: You reported dark stools and were found to have anemia or low blood count. You received 1 unit of packed red blood cells and your blood count improved. Upper endoscopy study showed one large ulcer that was the likely source of bleeding, and 2 smaller ones in your small intestines. These developed with excessive use of NSAIDs to manage hip pain. You were treated with medications such as pantoprazole and sulcrafate to protect your stomach and intestines. GI service followed you closely to monitor for re-bleeding.   If you have any signs of bloody or dark stools, dizziness, confusion, or signs of active bleeding, please return to the ED immediately. Follow up with your Primary Medical Doctor within the week you are discharged.    Diagnosis: Severe protein-calorie malnutrition  Assessment and Plan of Treatment: You had a loss of appetite, weight loss over several months, and found to have a very low body mass index score of 18. Dietician and speech/swallow team recommend you need to eat carefully to avoid aspiration. You received Ensure supplements in the hospital. Your diet was monitored carefully by the GI team to minimize risk of re-bleeding. Follow up with your Primary Medical Doctor within the week you are discharged and consider seeing an outpatient dietician to improve your nutritional intake.     PRINCIPAL DISCHARGE DIAGNOSIS  Diagnosis: Avascular necrosis of hip, left  Assessment and Plan of Treatment: You had left hip pain since your fall in December 2019. Imaging studies show evidence of avascular necrosis in the left hip area, or lack of blood supply which contributes to the pain you are experiencing. Dr. Durán and the orthopedic team evaluated you and said you did not need acute surgical intervention at this time. Physical Therapy and Occupational Therapy both recommended you for subacute rehabilation facility to build your strength.   Your pain was managed with bed rest and occassional Tylenol - AVOID using ibuprofen and other NSAIDs as this can worsen bleeding. After completing the subacute rehab, follow up with your Primary Medical Doctor within 1-3 days and see Dr. Durán at the orthopedic clinic within the week. If you have any fever, worsening pain, or clinical deterioration, please return to the ED immediately.      SECONDARY DISCHARGE DIAGNOSES  Diagnosis: Severe protein-calorie malnutrition  Assessment and Plan of Treatment: You have poor appetite, severe weight loss over several months, and a low body mass index (BMI) less than 18. The dietician diagnosed you with severe protein malnutrition. You received Ensure supplements in the hospital and should continue to take protein supplements at home. Your diet was monitored carefully by the GI team to minimize risk of re-bleeding. Speech/swallow team recommend you reduce your risk of aspiration by maintaining an upright posture (90 degrees) during and after eating for 30 mins, oral hygiene, and take small sips/bites. After subacute rehab, follow up with your Primary Medical Doctor within 1-3 days and we highly recommend you see an outpatient dietician to improve your nutritional intake.    Diagnosis: Duodenal bulb ulcer  Assessment and Plan of Treatment: You reported dark stools and were found with anemia or low blood count. You were transfused with 1 unit of packed red blood cells without complications and your blood count improved. The GI service closely manage your care. An upper endoscopy study showed one large ulcer that was the likely source of bleeding, and 2 smaller ones in your small intestines. Avoid future use ibuprofen and other NSAID medications to prevent re-bleeding or more ulcers. Use Tylenol or aspirin for pain management instead. You were also treated with pantoprazole and sulcrafate to protect your stomach and intestines. Continue taking medications as prescribed.   If you have any signs of bloody or dark stools, dizziness, confusion, or signs of active bleeding, please return to the ED immediately. After subacute rehab, follow up with your Primary Medical Doctor within 1-3 days and see an outpatient gastroenterologist within the week for further management.     PRINCIPAL DISCHARGE DIAGNOSIS  Diagnosis: Avascular necrosis of hip, left  Assessment and Plan of Treatment: You had left hip pain since your fall in December 2019. Imaging studies show evidence of avascular necrosis in the left hip area, or lack of blood supply which contributes to the pain you are experiencing. Dr. Durán and the orthopedic team evaluated you and said you did not need acute surgical intervention at this time. Physical Therapy and Occupational Therapy both recommended you for subacute rehabilation facility to build your strength.   Your pain was managed with bed rest and occassional Tylenol - AVOID using ibuprofen and other NSAIDs as this can worsen bleeding. After completing the subacute rehab, follow up with your Primary Medical Doctor within 1-3 days and see Dr. Durán at the orthopedic clinic within the week. If you have any fever, worsening pain, or clinical deterioration, please return to the ED immediately.      SECONDARY DISCHARGE DIAGNOSES  Diagnosis: Severe protein-calorie malnutrition  Assessment and Plan of Treatment: You have poor appetite, severe weight loss over several months, and a low body mass index (BMI) less than 18. The dietician diagnosed you with severe protein malnutrition. You received Ensure supplements in the hospital and should continue to take protein supplements at home. Your diet was monitored carefully by the GI team to minimize risk of re-bleeding. Speech/swallow team recommend you reduce your risk of aspiration by maintaining an upright posture (90 degrees) during and after eating for 30 mins, oral hygiene, and take small sips/bites. After subacute rehab, follow up with your Primary Medical Doctor within 1-3 days and we highly recommend you see an outpatient dietician to improve your nutritional intake.    Diagnosis: Duodenal bulb ulcer  Assessment and Plan of Treatment: You reported dark stools and were found with anemia or low blood count. You were transfused with 1 unit of packed red blood cells without complications and your blood count improved. The GI service closely manage your care. An upper endoscopy study showed one large ulcer that was the likely source of bleeding, and 2 smaller ones in your small intestines. Avoid future use ibuprofen and other NSAID medications to prevent re-bleeding or more ulcers. Use Tylenol or aspirin for pain management instead. You were also treated with pantoprazole and sulcrafate to protect your stomach and intestines. Continue taking medications as prescribed.   If you have any signs of bloody or dark stools, dizziness, confusion, or signs of active bleeding, please return to the ED immediately. After subacute rehab, follow up with your Primary Medical Doctor within 1-3 days and see an outpatient gastroenterologist within 6 weeks for a repeat upper endoscopy and further management.     PRINCIPAL DISCHARGE DIAGNOSIS  Diagnosis: Avascular necrosis of hip, left  Assessment and Plan of Treatment: You had left hip pain since your fall in December 2019. Imaging studies show evidence of avascular necrosis in the left hip area, or lack of blood supply which contributes to the pain you are experiencing. Dr. Durán and the orthopedic team evaluated you and said you did not need acute surgical intervention at this time. Physical Therapy and Occupational Therapy both recommended you for subacute rehabilation facility to build your strength.   Your pain was managed with bed rest and occassional Tylenol - AVOID using ibuprofen and other NSAIDs as this can worsen bleeding. After completing the subacute rehab, follow up with your Primary Medical Doctor within 1-3 days and see Dr. Durán at the orthopedic clinic within the week. If you have any fever, worsening pain, or clinical deterioration, please return to the ED immediately.      SECONDARY DISCHARGE DIAGNOSES  Diagnosis: Severe protein-calorie malnutrition  Assessment and Plan of Treatment: You have poor appetite, severe weight loss over several months, and a low body mass index (BMI) less than 18. The dietician diagnosed you with severe protein malnutrition. You received Ensure supplements in the hospital and should continue to take protein supplements at home. Your diet was monitored carefully by the GI team to minimize risk of re-bleeding. Speech/swallow team recommend you reduce your risk of aspiration by maintaining an upright posture (90 degrees) during and after eating for 30 mins, oral hygiene, and take small sips/bites. After subacute rehab, follow up with your Primary Medical Doctor within 1-3 days and we highly recommend you see an outpatient dietician to improve your nutritional intake.    Diagnosis: Duodenal bulb ulcer  Assessment and Plan of Treatment: You reported dark stools and were found with anemia or low blood count. You were transfused with 1 unit of packed red blood cells without complications and your blood count improved. The GI service closely manage your care. An upper endoscopy study showed one large ulcer that was the likely source of bleeding, and 2 smaller ones in your small intestines. Avoid future use ibuprofen and other NSAID medications to prevent re-bleeding or more ulcers. Use Tylenol or aspirin for pain management instead. You were also treated with pantoprazole and sulcrafate to protect your stomach and intestines. Continue taking medications as prescribed: take Protonix 40 mg twice daily for 8 weeks and then transition to once a day.   If you have any signs of bloody or dark stools, dizziness, confusion, or signs of active bleeding, please return to the ED immediately. After subacute rehab, follow up with your Primary Medical Doctor within 1-3 days and see an outpatient gastroenterologist within 6 weeks for a repeat upper endoscopy and further management.     PRINCIPAL DISCHARGE DIAGNOSIS  Diagnosis: Avascular necrosis of hip, left  Assessment and Plan of Treatment: You had left hip pain since your fall in December 2019. Imaging studies show evidence of avascular necrosis in the left hip area, or lack of blood supply which contributes to the pain you are experiencing. Dr. Durán and the orthopedic team evaluated you and said you did not need acute surgical intervention at this time. Physical Therapy and Occupational Therapy both recommended you for subacute rehabilation facility to build your strength.   Your pain was managed with bed rest and occassional Tylenol - AVOID using ibuprofen and other NSAIDs as this can worsen bleeding. After completing the subacute rehab, follow up with your Primary Medical Doctor within 1-3 days and see Dr. Durán at the orthopedic clinic within the week. If you have any fever, worsening pain, or clinical deterioration, please return to the ED immediately.      SECONDARY DISCHARGE DIAGNOSES  Diagnosis: Severe protein-calorie malnutrition  Assessment and Plan of Treatment: You have poor appetite, severe weight loss over several months, and a low body mass index (BMI) less than 18. The dietician diagnosed you with severe protein malnutrition. You received Ensure supplements in the hospital and should continue to take protein supplements at home. Your diet was monitored carefully by the GI team to minimize risk of re-bleeding. Speech/swallow team recommend you reduce your risk of aspiration by maintaining an upright posture (90 degrees) during and after eating for 30 mins, oral hygiene, and take small sips/bites. After subacute rehab, follow up with your Primary Medical Doctor within 1-3 days and we highly recommend you see an outpatient dietician to improve your nutritional intake.    Diagnosis: Duodenal bulb ulcer  Assessment and Plan of Treatment: You reported dark stools and were found with anemia or low blood count. You were transfused with 1 unit of packed red blood cells without complications and your blood count improved. The GI service closely manage your care. An upper endoscopy study showed one large ulcer that was the likely source of bleeding, and 2 smaller ones in your small intestines. Avoid future use ibuprofen and other NSAID medications to prevent re-bleeding or more ulcers. Use Tylenol or aspirin for pain management instead. You were also treated with pantoprazole and sulcrafate to protect your stomach and intestines. Continue taking medications as prescribed: take sulcrafate 1g twice daily and Protonix 40 mg twice daily. Take Protonix 8 weeks and then transition to once a day. Your medications will be adjusted after you visit the [facility] specialty clinic.   If you have any signs of bloody or dark stools, dizziness, confusion, or signs of active bleeding, please return to the ED immediately. After subacute rehab, follow up with your Primary Medical Doctor within 1-3 days and see the [facility] with an gastroenterologist within 6 weeks for a repeat upper endoscopy and further management.

## 2020-10-16 LAB
ALBUMIN SERPL ELPH-MCNC: 2.7 G/DL — LOW (ref 3.3–5)
ALP SERPL-CCNC: 69 U/L — SIGNIFICANT CHANGE UP (ref 40–120)
ALT FLD-CCNC: < 5 U/L — SIGNIFICANT CHANGE UP (ref 4–33)
ANION GAP SERPL CALC-SCNC: 13 MMO/L — SIGNIFICANT CHANGE UP (ref 7–14)
AST SERPL-CCNC: 11 U/L — SIGNIFICANT CHANGE UP (ref 4–32)
BASOPHILS # BLD AUTO: 0.01 K/UL — SIGNIFICANT CHANGE UP (ref 0–0.2)
BASOPHILS NFR BLD AUTO: 0.2 % — SIGNIFICANT CHANGE UP (ref 0–2)
BILIRUB SERPL-MCNC: 0.2 MG/DL — SIGNIFICANT CHANGE UP (ref 0.2–1.2)
BLD GP AB SCN SERPL QL: NEGATIVE — SIGNIFICANT CHANGE UP
BUN SERPL-MCNC: 12 MG/DL — SIGNIFICANT CHANGE UP (ref 7–23)
CALCIUM SERPL-MCNC: 8.9 MG/DL — SIGNIFICANT CHANGE UP (ref 8.4–10.5)
CHLORIDE SERPL-SCNC: 97 MMOL/L — LOW (ref 98–107)
CO2 SERPL-SCNC: 20 MMOL/L — LOW (ref 22–31)
CREAT SERPL-MCNC: 0.69 MG/DL — SIGNIFICANT CHANGE UP (ref 0.5–1.3)
EOSINOPHIL # BLD AUTO: 0.17 K/UL — SIGNIFICANT CHANGE UP (ref 0–0.5)
EOSINOPHIL NFR BLD AUTO: 2.9 % — SIGNIFICANT CHANGE UP (ref 0–6)
GLUCOSE SERPL-MCNC: 116 MG/DL — HIGH (ref 70–99)
HCT VFR BLD CALC: 31.2 % — LOW (ref 34.5–45)
HCT VFR BLD CALC: 33.4 % — LOW (ref 34.5–45)
HGB BLD-MCNC: 10.1 G/DL — LOW (ref 11.5–15.5)
HGB BLD-MCNC: 9.3 G/DL — LOW (ref 11.5–15.5)
IMM GRANULOCYTES NFR BLD AUTO: 0.3 % — SIGNIFICANT CHANGE UP (ref 0–1.5)
LYMPHOCYTES # BLD AUTO: 0.7 K/UL — LOW (ref 1–3.3)
LYMPHOCYTES # BLD AUTO: 12 % — LOW (ref 13–44)
MAGNESIUM SERPL-MCNC: 1.8 MG/DL — SIGNIFICANT CHANGE UP (ref 1.6–2.6)
MCHC RBC-ENTMCNC: 27.4 PG — SIGNIFICANT CHANGE UP (ref 27–34)
MCHC RBC-ENTMCNC: 28.7 PG — SIGNIFICANT CHANGE UP (ref 27–34)
MCHC RBC-ENTMCNC: 29.8 % — LOW (ref 32–36)
MCHC RBC-ENTMCNC: 30.2 % — LOW (ref 32–36)
MCV RBC AUTO: 90.5 FL — SIGNIFICANT CHANGE UP (ref 80–100)
MCV RBC AUTO: 96.3 FL — SIGNIFICANT CHANGE UP (ref 80–100)
MONOCYTES # BLD AUTO: 0.17 K/UL — SIGNIFICANT CHANGE UP (ref 0–0.9)
MONOCYTES NFR BLD AUTO: 2.9 % — SIGNIFICANT CHANGE UP (ref 2–14)
NEUTROPHILS # BLD AUTO: 4.75 K/UL — SIGNIFICANT CHANGE UP (ref 1.8–7.4)
NEUTROPHILS NFR BLD AUTO: 81.7 % — HIGH (ref 43–77)
NRBC # FLD: 0 K/UL — SIGNIFICANT CHANGE UP (ref 0–0)
NRBC # FLD: 0 K/UL — SIGNIFICANT CHANGE UP (ref 0–0)
PHOSPHATE SERPL-MCNC: 1.9 MG/DL — LOW (ref 2.5–4.5)
PLATELET # BLD AUTO: 584 K/UL — HIGH (ref 150–400)
PLATELET # BLD AUTO: 700 K/UL — HIGH (ref 150–400)
PMV BLD: 8.8 FL — SIGNIFICANT CHANGE UP (ref 7–13)
PMV BLD: 9.2 FL — SIGNIFICANT CHANGE UP (ref 7–13)
POTASSIUM SERPL-MCNC: 3.4 MMOL/L — LOW (ref 3.5–5.3)
POTASSIUM SERPL-SCNC: 3.4 MMOL/L — LOW (ref 3.5–5.3)
PROT SERPL-MCNC: 7.2 G/DL — SIGNIFICANT CHANGE UP (ref 6–8.3)
RBC # BLD: 3.24 M/UL — LOW (ref 3.8–5.2)
RBC # BLD: 3.69 M/UL — LOW (ref 3.8–5.2)
RBC # FLD: 13.2 % — SIGNIFICANT CHANGE UP (ref 10.3–14.5)
RBC # FLD: 13.2 % — SIGNIFICANT CHANGE UP (ref 10.3–14.5)
RH IG SCN BLD-IMP: POSITIVE — SIGNIFICANT CHANGE UP
SODIUM SERPL-SCNC: 130 MMOL/L — LOW (ref 135–145)
WBC # BLD: 5.82 K/UL — SIGNIFICANT CHANGE UP (ref 3.8–10.5)
WBC # BLD: 6.34 K/UL — SIGNIFICANT CHANGE UP (ref 3.8–10.5)
WBC # FLD AUTO: 5.82 K/UL — SIGNIFICANT CHANGE UP (ref 3.8–10.5)
WBC # FLD AUTO: 6.34 K/UL — SIGNIFICANT CHANGE UP (ref 3.8–10.5)

## 2020-10-16 PROCEDURE — 99233 SBSQ HOSP IP/OBS HIGH 50: CPT | Mod: GC

## 2020-10-16 PROCEDURE — 99232 SBSQ HOSP IP/OBS MODERATE 35: CPT | Mod: GC

## 2020-10-16 RX ORDER — INFLUENZA VIRUS VACCINE 15; 15; 15; 15 UG/.5ML; UG/.5ML; UG/.5ML; UG/.5ML
0.7 SUSPENSION INTRAMUSCULAR ONCE
Refills: 0 | Status: COMPLETED | OUTPATIENT
Start: 2020-10-16 | End: 2020-10-16

## 2020-10-16 RX ORDER — PANTOPRAZOLE SODIUM 20 MG/1
40 TABLET, DELAYED RELEASE ORAL
Refills: 0 | Status: DISCONTINUED | OUTPATIENT
Start: 2020-10-16 | End: 2020-10-21

## 2020-10-16 RX ORDER — POTASSIUM PHOSPHATE, MONOBASIC POTASSIUM PHOSPHATE, DIBASIC 236; 224 MG/ML; MG/ML
15 INJECTION, SOLUTION INTRAVENOUS ONCE
Refills: 0 | Status: COMPLETED | OUTPATIENT
Start: 2020-10-16 | End: 2020-10-16

## 2020-10-16 RX ORDER — SENNA PLUS 8.6 MG/1
2 TABLET ORAL AT BEDTIME
Refills: 0 | Status: DISCONTINUED | OUTPATIENT
Start: 2020-10-16 | End: 2020-10-21

## 2020-10-16 RX ADMIN — SENNA PLUS 2 TABLET(S): 8.6 TABLET ORAL at 22:41

## 2020-10-16 RX ADMIN — LISINOPRIL 40 MILLIGRAM(S): 2.5 TABLET ORAL at 10:32

## 2020-10-16 RX ADMIN — BRIMONIDINE TARTRATE 1 DROP(S): 2 SOLUTION/ DROPS OPHTHALMIC at 17:12

## 2020-10-16 RX ADMIN — POLYETHYLENE GLYCOL 3350 17 GRAM(S): 17 POWDER, FOR SOLUTION ORAL at 13:04

## 2020-10-16 RX ADMIN — POTASSIUM PHOSPHATE, MONOBASIC POTASSIUM PHOSPHATE, DIBASIC 62.5 MILLIMOLE(S): 236; 224 INJECTION, SOLUTION INTRAVENOUS at 13:04

## 2020-10-16 RX ADMIN — Medication 1 DROP(S): at 17:12

## 2020-10-16 RX ADMIN — AMLODIPINE BESYLATE 10 MILLIGRAM(S): 2.5 TABLET ORAL at 05:43

## 2020-10-16 RX ADMIN — Medication 1 DROP(S): at 05:43

## 2020-10-16 RX ADMIN — PANTOPRAZOLE SODIUM 40 MILLIGRAM(S): 20 TABLET, DELAYED RELEASE ORAL at 05:43

## 2020-10-16 RX ADMIN — Medication 1 GRAM(S): at 05:43

## 2020-10-16 RX ADMIN — PANTOPRAZOLE SODIUM 40 MILLIGRAM(S): 20 TABLET, DELAYED RELEASE ORAL at 17:14

## 2020-10-16 RX ADMIN — Medication 1 GRAM(S): at 13:04

## 2020-10-16 RX ADMIN — Medication 15 MILLILITER(S): at 13:04

## 2020-10-16 RX ADMIN — Medication 1 GRAM(S): at 17:12

## 2020-10-16 RX ADMIN — BRIMONIDINE TARTRATE 1 DROP(S): 2 SOLUTION/ DROPS OPHTHALMIC at 05:43

## 2020-10-16 RX ADMIN — Medication 1 GRAM(S): at 23:21

## 2020-10-16 NOTE — PROGRESS NOTE ADULT - PROBLEM SELECTOR PLAN 1
h/o left intertrochanteric fracture s/p left femur intramedullary nail done on 12/12/2019 by Dr. Durán. 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 Associated numbness and weakness on LLE  -appreciate ortho recs - follow up outpatient  -pain mgmt with non-NSAIDs   -PT and OT rec rehab  -pending dispo to [facility]

## 2020-10-16 NOTE — PROGRESS NOTE ADULT - PROBLEM SELECTOR PLAN 7
VTE ppx: mechanical SCD only due to GIB  Diet: cld - adv to fld then regular as tolerated  -appreciate dietician recs  Dispo planning: PT and OT rec rehab

## 2020-10-16 NOTE — PROGRESS NOTE ADULT - SUBJECTIVE AND OBJECTIVE BOX
PROGRESS NOTE:   Authoted by Dr. Benito Galeano MD, S  Pager [phone] [facility], [id number] [facility]   After 7pm on weekdays or after 1pm on weekends: [facility] low teams #[id number] / [facility] high teams #[id number], [facility] low teams #[id number], [facility] high teams #[id number]  Patient is a 89y old  Female who presents with a chief complaint of Fall (15 Oct 2020 14:24)      SUBJECTIVE / OVERNIGHT EVENTS:    ADDITIONAL REVIEW OF SYSTEMS:    MEDICATIONS  (STANDING):  amLODIPine   Tablet 10 milliGRAM(s) Oral daily  brimonidine 0.2% Ophthalmic Solution 1 Drop(s) Both EYES two times a day  influenza   Vaccine 0.5 milliLiter(s) IntraMuscular once  lisinopril 40 milliGRAM(s) Oral daily  multivitamin/minerals/iron Oral Solution (CENTRUM) 15 milliLiter(s) Oral daily  pantoprazole  Injectable 40 milliGRAM(s) IV Push two times a day  polyethylene glycol 3350 17 Gram(s) Oral daily  sucralfate 1 Gram(s) Oral four times a day  timolol 0.5% Solution 1 Drop(s) Both EYES two times a day    MEDICATIONS  (PRN):  acetaminophen   Tablet .. 650 milliGRAM(s) Oral every 6 hours PRN Temp greater or equal to 38C (100.4F), Mild Pain (1 - 3)      CAPILLARY BLOOD GLUCOSE        I&O's Summary    15 Oct 2020 07:01  -  16 Oct 2020 06:17  --------------------------------------------------------  IN: 240 mL / OUT: 0 mL / NET: 240 mL        PHYSICAL EXAM:  Vital Signs Last 24 Hrs  T(C): 36.4 (16 Oct 2020 05:12), Max: 36.4 (16 Oct 2020 05:12)  T(F): 97.6 (16 Oct 2020 05:12), Max: 97.6 (16 Oct 2020 05:12)  HR: 57 (16 Oct 2020 05:12) (57 - 71)  BP: 113/47 (16 Oct 2020 05:12) (113/47 - 132/62)  BP(mean): --  RR: 17 (16 Oct 2020 05:12) (17 - 18)  SpO2: 100% (16 Oct 2020 05:12) (99% - 100%)    CONSTITUTIONAL: NAD, well-developed  RESPIRATORY: Normal respiratory effort; lungs are clear to auscultation bilaterally  CARDIOVASCULAR: Regular rate and rhythm, normal S1 and S2, no murmur/rub/gallop; No lower extremity edema; Peripheral pulses are 2+ bilaterally  ABDOMEN: Nontender to palpation, normoactive bowel sounds, no rebound/guarding; No hepatosplenomegaly  MUSCLOSKELETAL: no clubbing or cyanosis of digits; no joint swelling or tenderness to palpation  PSYCH: A+O to person, place, and time; affect appropriate    LABS:                        9.3    6.78  )-----------( 585      ( 15 Oct 2020 06:30 )             29.2     10-15    132<L>  |  102  |  16  ----------------------------<  74  3.2<L>   |  17<L>  |  0.64    Ca    8.3<L>      15 Oct 2020 06:30  Phos  1.8     10-15  Mg     1.9     10-15    TPro  6.5  /  Alb  2.4<L>  /  TBili  0.3  /  DBili  x   /  AST  12  /  ALT  6   /  AlkPhos  55  10-15                RADIOLOGY & ADDITIONAL TESTS:  Results Reviewed:   Imaging Personally Reviewed:  Electrocardiogram Personally Reviewed:    COORDINATION OF CARE:  Care Discussed with Consultants/Other Providers [Y/N]:  Prior or Outpatient Records Reviewed [Y/N]:   PROGRESS NOTE:   Authoted by Dr. Benito Galeano MD, S  Pager [phone] [facility], [address] [facility]   After 7pm on weekdays or after 1pm on weekends: [facility] low teams #[id number] / [facility] high teams #[id number], [facility] low teams #[id number], [facility] high teams #[id number]  Patient is a 89y old  Female who presents with a chief complaint of Fall (15 Oct 2020 14:24)      SUBJECTIVE / OVERNIGHT EVENTS: No acute events overnight    ADDITIONAL REVIEW OF SYSTEMS: No abd pain, fevers/chills, hip pain or N/V. No further melanotic stools, however pt denies having BM during admission.     MEDICATIONS  (STANDING):  amLODIPine   Tablet 10 milliGRAM(s) Oral daily  brimonidine 0.2% Ophthalmic Solution 1 Drop(s) Both EYES two times a day  influenza   Vaccine 0.5 milliLiter(s) IntraMuscular once  lisinopril 40 milliGRAM(s) Oral daily  multivitamin/minerals/iron Oral Solution (CENTRUM) 15 milliLiter(s) Oral daily  pantoprazole  Injectable 40 milliGRAM(s) IV Push two times a day  polyethylene glycol 3350 17 Gram(s) Oral daily  sucralfate 1 Gram(s) Oral four times a day  timolol 0.5% Solution 1 Drop(s) Both EYES two times a day    MEDICATIONS  (PRN):  acetaminophen   Tablet .. 650 milliGRAM(s) Oral every 6 hours PRN Temp greater or equal to 38C (100.4F), Mild Pain (1 - 3)      CAPILLARY BLOOD GLUCOSE        I&O's Summary    15 Oct 2020 07:01  -  16 Oct 2020 06:17  --------------------------------------------------------  IN: 240 mL / OUT: 0 mL / NET: 240 mL        PHYSICAL EXAM:  Vital Signs Last 24 Hrs  T(C): 36.4 (16 Oct 2020 05:12), Max: 36.4 (16 Oct 2020 05:12)  T(F): 97.6 (16 Oct 2020 05:12), Max: 97.6 (16 Oct 2020 05:12)  HR: 57 (16 Oct 2020 05:12) (57 - 71)  BP: 113/47 (16 Oct 2020 05:12) (113/47 - 132/62)  BP(mean): --  RR: 17 (16 Oct 2020 05:12) (17 - 18)  SpO2: 100% (16 Oct 2020 05:12) (99% - 100%)    CONSTITUTIONAL: NAD, elderly female   RESPIRATORY: Normal respiratory effort; lungs are clear to auscultation bilaterally  CARDIOVASCULAR: Regular rate and rhythm, normal S1 and S2, no murmur/rub/gallop; No lower extremity edema; Peripheral pulses are 2+ bilaterally  ABDOMEN: Nontender to palpation, normoactive bowel sounds, no rebound/guarding; No hepatosplenomegaly  MUSCLOSKELETAL: +tenderness of left hip. no clubbing or cyanosis of digits; no joint swelling   PSYCH: A+O to person, place, and time; affect appropriate    LABS:                        9.3    6.78  )-----------( 585      ( 15 Oct 2020 06:30 )             29.2     10-15    132<L>  |  102  |  16  ----------------------------<  74  3.2<L>   |  17<L>  |  0.64    Ca    8.3<L>      15 Oct 2020 06:30  Phos  1.8     10-15  Mg     1.9     10-15    TPro  6.5  /  Alb  2.4<L>  /  TBili  0.3  /  DBili  x   /  AST  12  /  ALT  6   /  AlkPhos  55  10-15                RADIOLOGY & ADDITIONAL TESTS:  Results Reviewed:   Imaging Personally Reviewed:  Electrocardiogram Personally Reviewed:    COORDINATION OF CARE:  Care Discussed with Consultants/Other Providers [Y/N]:  Prior or Outpatient Records Reviewed [Y/N]:

## 2020-10-16 NOTE — PROGRESS NOTE ADULT - PROBLEM SELECTOR PLAN 2
GIB likely due to NSAID induce gastric ulcers  -egd found large duodenal ulcer 40mm diameter likely source of melena and two smaller duodenal ulcers <8mm. No biopsies taken due to risk of bowel perforation  -follow H pylori stool Ag  -tx sulcrafate qid, pantoprazole IV BID, manage pain with non-NSAIDs  -maintain type and screen  -monitor daily CBC, Hb improving and pt remains stable  -advanced to regular diet

## 2020-10-16 NOTE — PROVIDER CONTACT NOTE (OTHER) - REASON
/47 MDT DUAL PM  CARELINK TRANSMISSION:  BATTERY VOLTAGE ADEQUATE (8 YR)   AP 99 8%  <0 1%    ALL LEAD PARAMETERS WITHIN NORMAL LIMITS   NO SIGNIFICANT HIGH RATE EPISODES   NORMAL DEVICE FUNCTION  Rika Knight

## 2020-10-16 NOTE — PROGRESS NOTE ADULT - ASSESSMENT
89 year old woman with osteoporosis, HTN and left intertrochanteric fracture s/p IMN on 12/12/19 admitted for fall, failure to thrive, and dark stools with acute blood loss anemia secondary to severe PUD in setting of chronic ibuprofen use.     #Peptic ulcer disease secondary to NSAID overuse: Single large, cratered duodenal ulcer with evidence/stigmata of recent bleeding however no interventions for prevention of re-bleeding performed due to perceived risk of small bowel perforation.  #History of L. intertrochanteric fracture s/p IMN 12/2019 with chronic hip pain requiring NSAID overuse   #Underweight with BMI <19.0  #Hypertension    Recommendations:  - can switch to protonix 40 mg bid  - continue monitoring of Hb on serial CBCs and bowel movements to assess for rebleeding  - as patient remains at high risk for re-bleeding, would repeat upper endoscopy for downtrending Hb/additional pRBC transfusions/recurrent melena to attempt possible interventions  - non-NSAIDS for pain control only  - GI to follow    Lu Dennis PGY3 89 year old woman with osteoporosis, HTN and left intertrochanteric fracture s/p IMN on 12/12/19 admitted for fall, failure to thrive, and dark stools with acute blood loss anemia secondary to severe PUD in setting of chronic ibuprofen use.     #Peptic ulcer disease secondary to NSAID overuse: Single large, cratered duodenal ulcer with evidence/stigmata of recent bleeding however no interventions for prevention of re-bleeding performed due to perceived risk of small bowel perforation.  #History of L. intertrochanteric fracture s/p IMN 12/2019 with chronic hip pain requiring NSAID overuse   #Underweight with BMI <19.0  #Hypertension    Recommendations:  - can switch to protonix 40 mg bid  - please obtain serum H pylori antibody  - continue monitoring of Hb on serial CBCs and bowel movements to assess for rebleeding  - as patient remains at high risk for re-bleeding, would repeat upper endoscopy for downtrending Hb/additional pRBC transfusions/recurrent melena to attempt possible interventions  - non-NSAIDS for pain control only  - GI to follow    Lu Dennis PGY3

## 2020-10-16 NOTE — PROGRESS NOTE ADULT - PROBLEM SELECTOR PLAN 3
endorses poor appetite for past 2 months with loss of taste in recent weeks  -appreciate dietician recs   -multivitamin solution for nutritional deficiency

## 2020-10-16 NOTE — PROGRESS NOTE ADULT - SUBJECTIVE AND OBJECTIVE BOX
House GI Progress Note    Chief Complaint:  Patient is a 89y old  Female who presents with a chief complaint of Fall (16 Oct 2020 06:17)    Interval Events / Subjective: No events overnight. Pt still has had no BMs since before admission. She denies abdominal pain, nausea, vomiting, fevers, chills. Pt tolerated full liquid diet yesterday, advanced to regular diet today.    REVIEW OF SYSTEMS:   General: No fever, chills, fatigue  CV: No chest pain  Respiratory: No dyspnea  GI: See HPI  : No dysuria  MSK: No myalgias  Neuro: No headache   Endocrine: frequently feels cold  Hematologic: No petechiae, ecchymoses    Skin: No rashes or lesions    MEDICATIONS:   MEDICATIONS  (STANDING):  amLODIPine   Tablet 10 milliGRAM(s) Oral daily  brimonidine 0.2% Ophthalmic Solution 1 Drop(s) Both EYES two times a day  influenza   Vaccine 0.5 milliLiter(s) IntraMuscular once  influenza  Vaccine (HIGH DOSE) 0.7 milliLiter(s) IntraMuscular once  lisinopril 40 milliGRAM(s) Oral daily  multivitamin/minerals/iron Oral Solution (CENTRUM) 15 milliLiter(s) Oral daily  pantoprazole    Tablet 40 milliGRAM(s) Oral two times a day  polyethylene glycol 3350 17 Gram(s) Oral daily  senna 2 Tablet(s) Oral at bedtime  sucralfate 1 Gram(s) Oral four times a day  timolol 0.5% Solution 1 Drop(s) Both EYES two times a day    MEDICATIONS  (PRN):  acetaminophen   Tablet .. 650 milliGRAM(s) Oral every 6 hours PRN Temp greater or equal to 38C (100.4F), Mild Pain (1 - 3)    ALLERGIES:   No Known Allergies    VITAL SIGNS:   Vital Signs Last 24 Hrs  T(C): 36.4 (16 Oct 2020 05:12), Max: 36.4 (16 Oct 2020 05:12)  T(F): 97.6 (16 Oct 2020 05:12), Max: 97.6 (16 Oct 2020 05:12)  HR: 57 (16 Oct 2020 05:12) (57 - 71)  BP: 113/47 (16 Oct 2020 05:12) (113/47 - 132/62)  BP(mean): --  RR: 17 (16 Oct 2020 05:12) (17 - 18)  SpO2: 100% (16 Oct 2020 05:12) (99% - 100%)  I&O's Summary    15 Oct 2020 07:01  -  16 Oct 2020 07:00  --------------------------------------------------------  IN: 240 mL / OUT: 0 mL / NET: 240 mL    PHYSICAL EXAM:   GENERAL:  Appears stated age, well-groomed, no distress  HEENT:  NC/AT,  conjunctivae clear, sclera -anicteric  CHEST:  Full & symmetric excursion, no increased effort, breath sounds clear  HEART:  Regular rhythm, S1, S2, no murmur/rub/S3/S4, no edema  ABDOMEN:  Soft, non-tender, non-distended, normoactive bowel sounds, no masses, no hepato-splenomegaly  EXTREMITIES: No cyanosis, clubbing or edema  SKIN:  No rash/erythema/ecchymoses/petechiae/wounds/abscess/warm/dry  NEURO:  AAOx3    LABS:  CBC Full  -  ( 16 Oct 2020 07:35 )  WBC Count : 6.34 K/uL  RBC Count : 3.24 M/uL  Hemoglobin : 9.3 g/dL  Hematocrit : 31.2 %  Platelet Count - Automated : 584 K/uL  Mean Cell Volume : 96.3 fL  Mean Cell Hemoglobin : 28.7 pg  Mean Cell Hemoglobin Concentration : 29.8 %  Auto Neutrophil # : x  Auto Lymphocyte # : x  Auto Monocyte # : x  Auto Eosinophil # : x  Auto Basophil # : x  Auto Neutrophil % : x  Auto Lymphocyte % : x  Auto Monocyte % : x  Auto Eosinophil % : x  Auto Basophil % : x    10-15    132<L>  |  102  |  16  ----------------------------<  74  3.2<L>   |  17<L>  |  0.64    Ca    8.3<L>      15 Oct 2020 06:30  Phos  1.8     10-15  Mg     1.9     10-15    TPro  6.5  /  Alb  2.4<L>  /  TBili  0.3  /  DBili  x   /  AST  12  /  ALT  6   /  AlkPhos  55  10-15    LIVER FUNCTIONS - ( 15 Oct 2020 06:30 )  Alb: 2.4 g/dL / Pro: 6.5 g/dL / ALK PHOS: 55 u/L / ALT: 6 u/L / AST: 12 u/L / GGT: x             IMAGING: reviewed

## 2020-10-16 NOTE — PROGRESS NOTE ADULT - ASSESSMENT
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

## 2020-10-17 LAB
ANION GAP SERPL CALC-SCNC: 10 MMO/L — SIGNIFICANT CHANGE UP (ref 7–14)
BUN SERPL-MCNC: 12 MG/DL — SIGNIFICANT CHANGE UP (ref 7–23)
CALCIUM SERPL-MCNC: 8.2 MG/DL — LOW (ref 8.4–10.5)
CHLORIDE SERPL-SCNC: 101 MMOL/L — SIGNIFICANT CHANGE UP (ref 98–107)
CO2 SERPL-SCNC: 19 MMOL/L — LOW (ref 22–31)
CREAT SERPL-MCNC: 0.73 MG/DL — SIGNIFICANT CHANGE UP (ref 0.5–1.3)
GLUCOSE SERPL-MCNC: 105 MG/DL — HIGH (ref 70–99)
HCT VFR BLD CALC: 28.9 % — LOW (ref 34.5–45)
HGB BLD-MCNC: 9.1 G/DL — LOW (ref 11.5–15.5)
MAGNESIUM SERPL-MCNC: 1.9 MG/DL — SIGNIFICANT CHANGE UP (ref 1.6–2.6)
MCHC RBC-ENTMCNC: 28.4 PG — SIGNIFICANT CHANGE UP (ref 27–34)
MCHC RBC-ENTMCNC: 31.5 % — LOW (ref 32–36)
MCV RBC AUTO: 90.3 FL — SIGNIFICANT CHANGE UP (ref 80–100)
NRBC # FLD: 0 K/UL — SIGNIFICANT CHANGE UP (ref 0–0)
PHOSPHATE SERPL-MCNC: 1.9 MG/DL — LOW (ref 2.5–4.5)
PLATELET # BLD AUTO: 637 K/UL — HIGH (ref 150–400)
PMV BLD: 9 FL — SIGNIFICANT CHANGE UP (ref 7–13)
POTASSIUM SERPL-MCNC: 4.5 MMOL/L — SIGNIFICANT CHANGE UP (ref 3.5–5.3)
POTASSIUM SERPL-SCNC: 4.5 MMOL/L — SIGNIFICANT CHANGE UP (ref 3.5–5.3)
RBC # BLD: 3.2 M/UL — LOW (ref 3.8–5.2)
RBC # FLD: 12.7 % — SIGNIFICANT CHANGE UP (ref 10.3–14.5)
SODIUM SERPL-SCNC: 130 MMOL/L — LOW (ref 135–145)
WBC # BLD: 5.04 K/UL — SIGNIFICANT CHANGE UP (ref 3.8–10.5)
WBC # FLD AUTO: 5.04 K/UL — SIGNIFICANT CHANGE UP (ref 3.8–10.5)

## 2020-10-17 PROCEDURE — 99233 SBSQ HOSP IP/OBS HIGH 50: CPT | Mod: GC

## 2020-10-17 RX ORDER — POLYETHYLENE GLYCOL 3350 17 G/17G
17 POWDER, FOR SOLUTION ORAL
Qty: 0 | Refills: 0 | DISCHARGE
Start: 2020-10-17

## 2020-10-17 RX ORDER — ACETAMINOPHEN 500 MG
2 TABLET ORAL
Qty: 0 | Refills: 0 | DISCHARGE
Start: 2020-10-17

## 2020-10-17 RX ORDER — PANTOPRAZOLE SODIUM 20 MG/1
1 TABLET, DELAYED RELEASE ORAL
Qty: 60 | Refills: 0
Start: 2020-10-17 | End: 2020-11-15

## 2020-10-17 RX ORDER — SENNA PLUS 8.6 MG/1
2 TABLET ORAL
Qty: 0 | Refills: 0 | DISCHARGE
Start: 2020-10-17

## 2020-10-17 RX ORDER — IBUPROFEN 200 MG
1 TABLET ORAL
Qty: 0 | Refills: 0 | DISCHARGE

## 2020-10-17 RX ORDER — SODIUM CHLORIDE 9 MG/ML
1000 INJECTION INTRAMUSCULAR; INTRAVENOUS; SUBCUTANEOUS
Refills: 0 | Status: DISCONTINUED | OUTPATIENT
Start: 2020-10-17 | End: 2020-10-18

## 2020-10-17 RX ORDER — SUCRALFATE 1 G
1 TABLET ORAL
Qty: 120 | Refills: 0
Start: 2020-10-17 | End: 2020-11-15

## 2020-10-17 RX ADMIN — LISINOPRIL 40 MILLIGRAM(S): 2.5 TABLET ORAL at 05:32

## 2020-10-17 RX ADMIN — Medication 1 GRAM(S): at 23:07

## 2020-10-17 RX ADMIN — POLYETHYLENE GLYCOL 3350 17 GRAM(S): 17 POWDER, FOR SOLUTION ORAL at 11:02

## 2020-10-17 RX ADMIN — SENNA PLUS 2 TABLET(S): 8.6 TABLET ORAL at 22:15

## 2020-10-17 RX ADMIN — BRIMONIDINE TARTRATE 1 DROP(S): 2 SOLUTION/ DROPS OPHTHALMIC at 05:32

## 2020-10-17 RX ADMIN — Medication 15 MILLILITER(S): at 11:03

## 2020-10-17 RX ADMIN — SODIUM CHLORIDE 75 MILLILITER(S): 9 INJECTION INTRAMUSCULAR; INTRAVENOUS; SUBCUTANEOUS at 11:01

## 2020-10-17 RX ADMIN — Medication 1 DROP(S): at 18:08

## 2020-10-17 RX ADMIN — AMLODIPINE BESYLATE 10 MILLIGRAM(S): 2.5 TABLET ORAL at 05:32

## 2020-10-17 RX ADMIN — Medication 85 MILLIMOLE(S): at 11:01

## 2020-10-17 RX ADMIN — BRIMONIDINE TARTRATE 1 DROP(S): 2 SOLUTION/ DROPS OPHTHALMIC at 18:08

## 2020-10-17 RX ADMIN — Medication 1 GRAM(S): at 18:08

## 2020-10-17 RX ADMIN — Medication 1 GRAM(S): at 05:32

## 2020-10-17 RX ADMIN — PANTOPRAZOLE SODIUM 40 MILLIGRAM(S): 20 TABLET, DELAYED RELEASE ORAL at 18:08

## 2020-10-17 RX ADMIN — Medication 1 GRAM(S): at 11:02

## 2020-10-17 RX ADMIN — Medication 1 DROP(S): at 05:32

## 2020-10-17 RX ADMIN — PANTOPRAZOLE SODIUM 40 MILLIGRAM(S): 20 TABLET, DELAYED RELEASE ORAL at 05:32

## 2020-10-17 NOTE — PROGRESS NOTE ADULT - PROBLEM SELECTOR PLAN 2
GIB likely due to NSAID induce gastric ulcers  -egd found large duodenal ulcer 40mm diameter likely source of melena and two smaller duodenal ulcers <8mm. No biopsies taken due to risk of bowel perforation  -follow serum H pylori antibody, obtain H pylori stool Ag with next BM   -tx sulcrafate qid, pantoprazole PO BID, manage pain with non-NSAIDs  -maintain type and screen  -monitor daily CBC, Hb improving and pt remains stable  -regular diet

## 2020-10-17 NOTE — PROGRESS NOTE ADULT - PROBLEM SELECTOR PLAN 1
h/o left intertrochanteric fracture s/p left femur intramedullary nail done on 12/12/2019 by Dr. Durán. 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 Associated numbness and weakness on LLE  -appreciate ortho recs - follow up outpatient  -pain mgmt with non-NSAIDs   -PT and OT rec rehab  -pending dispo to [facility], COVID19 neg 10/15 h/o left intertrochanteric fracture s/p left femur intramedullary nail done on 12/12/2019 by Dr. Durán. 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 Associated numbness and weakness on LLE  -appreciate ortho recs - follow up outpatient  -pain mgmt with non-NSAIDs, has not taken PRN meds  -PT and OT rec rehab  -pending dispo to [facility] neg 10/15

## 2020-10-17 NOTE — PROGRESS NOTE ADULT - SUBJECTIVE AND OBJECTIVE BOX
PROGRESS NOTE:   Authored by Sharron Almeida MD  Pager: [facility] [phone]; [facility] [phone]    Patient is a 89y old  Female who presents with a chief complaint of Fall (16 Oct 2020 09:26)      SUBJECTIVE / OVERNIGHT EVENTS: overnight no acute events. ?BM ?tolerating regular diet, ?signs of dysphagia    ADDITIONAL REVIEW OF SYSTEMS:  CONSTITUTIONAL: No weakness, fevers or chills  EYES/ENT: No visual changes;  No vertigo or throat pain   NECK: No pain or stiffness  RESPIRATORY: No cough, wheezing, hemoptysis; No shortness of breath  CARDIOVASCULAR: No chest pain or palpitations  GASTROINTESTINAL: No abdominal pain; nausea, vomiting;  diarrhea or constipation. No hemetemesis, melena or hematochezia.  GENITOURINARY: No dysuria, frequency or hematuria  NEUROLOGICAL: No numbness or weakness  SKIN: No itching, burning, rashes, or lesions     MEDICATIONS  (STANDING):  amLODIPine   Tablet 10 milliGRAM(s) Oral daily  brimonidine 0.2% Ophthalmic Solution 1 Drop(s) Both EYES two times a day  influenza   Vaccine 0.5 milliLiter(s) IntraMuscular once  influenza  Vaccine (HIGH DOSE) 0.7 milliLiter(s) IntraMuscular once  lisinopril 40 milliGRAM(s) Oral daily  multivitamin/minerals/iron Oral Solution (CENTRUM) 15 milliLiter(s) Oral daily  pantoprazole    Tablet 40 milliGRAM(s) Oral two times a day  polyethylene glycol 3350 17 Gram(s) Oral daily  senna 2 Tablet(s) Oral at bedtime  sucralfate 1 Gram(s) Oral four times a day  timolol 0.5% Solution 1 Drop(s) Both EYES two times a day    MEDICATIONS  (PRN):  acetaminophen   Tablet .. 650 milliGRAM(s) Oral every 6 hours PRN Temp greater or equal to 38C (100.4F), Mild Pain (1 - 3)      CAPILLARY BLOOD GLUCOSE        I&O's Summary    16 Oct 2020 07:01  -  17 Oct 2020 07:00  --------------------------------------------------------  IN: 200 mL / OUT: 0 mL / NET: 200 mL        PHYSICAL EXAM:  Vital Signs Last 24 Hrs  T(C): 36.2 (17 Oct 2020 05:29), Max: 37.2 (16 Oct 2020 21:20)  T(F): 97.1 (17 Oct 2020 05:29), Max: 99 (16 Oct 2020 21:20)  HR: 72 (17 Oct 2020 05:29) (66 - 76)  BP: 162/60 (17 Oct 2020 05:29) (115/55 - 162/60)  BP(mean): --  RR: 19 (17 Oct 2020 05:29) (18 - 19)  SpO2: 100% (17 Oct 2020 05:29) (98% - 100%)    CONSTITUTIONAL: NAD, well-developed  RESPIRATORY: Normal respiratory effort; lungs are clear to auscultation bilaterally  CARDIOVASCULAR: Regular rate and rhythm, normal S1 and S2, no murmur/rub/gallop; No lower extremity edema; Peripheral pulses are 2+ bilaterally  ABDOMEN: Nontender to palpation, normoactive bowel sounds, no rebound/guarding; No hepatosplenomegaly  MUSCLOSKELETAL: no clubbing or cyanosis of digits; no joint swelling or tenderness to palpation  PSYCH: A+O to person, place, and time; affect appropriate    LABS:                        10.1   5.82  )-----------( 700      ( 16 Oct 2020 11:14 )             33.4     10-16    130<L>  |  97<L>  |  12  ----------------------------<  116<H>  3.4<L>   |  20<L>  |  0.69    Ca    8.9      16 Oct 2020 11:14  Phos  1.9     10-16  Mg     1.8     10-16    TPro  7.2  /  Alb  2.7<L>  /  TBili  0.2  /  DBili  x   /  AST  11  /  ALT  < 5  /  AlkPhos  69  10-16                RADIOLOGY & ADDITIONAL TESTS:  Results Reviewed:   Imaging Personally Reviewed:  Electrocardiogram Personally Reviewed:    COORDINATION OF CARE:  Care Discussed with Consultants/Other Providers [Y/N]:  Prior or Outpatient Records Reviewed [Y/N]:   PROGRESS NOTE:   Authored by Sharron Almeida MD  Pager: [facility] [phone]; [facility] [phone]    Patient is a 89y old  Female who presents with a chief complaint of Fall (16 Oct 2020 09:26)      SUBJECTIVE / OVERNIGHT EVENTS: overnight no acute events. No BM while taking Miralax and senna. Tolerating regular diet without signs of dysphagia or aspiration, not coughing. Appetite is fair, drank one Ensure supplement. L hip pain feels worse with cooler temperature. No fever, chills, SOB, abd pain.     ADDITIONAL REVIEW OF SYSTEMS:  CONSTITUTIONAL: + cold.  No weakness, fevers or chills  EYES/ENT: No visual changes;  No vertigo or throat pain   NECK: No pain or stiffness  RESPIRATORY: No cough, wheezing, hemoptysis; No shortness of breath  CARDIOVASCULAR: No chest pain or palpitations  GASTROINTESTINAL: +constipation. No abdominal pain; nausea, vomiting;  No diarrhea. No hemetemesis, melena or hematochezia.  GENITOURINARY: No dysuria, frequency or hematuria  NEUROLOGICAL: No numbness or weakness  SKIN: No itching, burning, rashes, or lesions     MEDICATIONS  (STANDING):  amLODIPine   Tablet 10 milliGRAM(s) Oral daily  brimonidine 0.2% Ophthalmic Solution 1 Drop(s) Both EYES two times a day  influenza   Vaccine 0.5 milliLiter(s) IntraMuscular once  influenza  Vaccine (HIGH DOSE) 0.7 milliLiter(s) IntraMuscular once  lisinopril 40 milliGRAM(s) Oral daily  multivitamin/minerals/iron Oral Solution (CENTRUM) 15 milliLiter(s) Oral daily  pantoprazole    Tablet 40 milliGRAM(s) Oral two times a day  polyethylene glycol 3350 17 Gram(s) Oral daily  senna 2 Tablet(s) Oral at bedtime  sucralfate 1 Gram(s) Oral four times a day  timolol 0.5% Solution 1 Drop(s) Both EYES two times a day    MEDICATIONS  (PRN):  acetaminophen   Tablet .. 650 milliGRAM(s) Oral every 6 hours PRN Temp greater or equal to 38C (100.4F), Mild Pain (1 - 3)      CAPILLARY BLOOD GLUCOSE        I&O's Summary    16 Oct 2020 07:01  -  17 Oct 2020 07:00  --------------------------------------------------------  IN: 200 mL / OUT: 0 mL / NET: 200 mL        PHYSICAL EXAM:  Vital Signs Last 24 Hrs  T(C): 36.2 (17 Oct 2020 05:29), Max: 37.2 (16 Oct 2020 21:20)  T(F): 97.1 (17 Oct 2020 05:29), Max: 99 (16 Oct 2020 21:20)  HR: 72 (17 Oct 2020 05:29) (66 - 76)  BP: 162/60 (17 Oct 2020 05:29) (115/55 - 162/60)  BP(mean): --  RR: 19 (17 Oct 2020 05:29) (18 - 19)  SpO2: 100% (17 Oct 2020 05:29) (98% - 100%)    CONSTITUTIONAL: NAD, well-developed  RESPIRATORY: Normal respiratory effort; lungs are clear to auscultation bilaterally  CARDIOVASCULAR: Regular rate and rhythm, normal S1 and S2, no murmur/rub/gallop; No lower extremity edema; Peripheral pulses are 2+ bilaterally  ABDOMEN: Nontender to palpation, normoactive bowel sounds, no rebound/guarding; No hepatosplenomegaly  MUSCLOSKELETAL: no clubbing or cyanosis of digits; no joint swelling or tenderness to palpation  PSYCH: A+O to person, place, and time; affect appropriate    LABS:                        10.1   5.82  )-----------( 700      ( 16 Oct 2020 11:14 )             33.4     10-16    130<L>  |  97<L>  |  12  ----------------------------<  116<H>  3.4<L>   |  20<L>  |  0.69    Ca    8.9      16 Oct 2020 11:14  Phos  1.9     10-16  Mg     1.8     10-16    TPro  7.2  /  Alb  2.7<L>  /  TBili  0.2  /  DBili  x   /  AST  11  /  ALT  < 5  /  AlkPhos  69  10-16                RADIOLOGY & ADDITIONAL TESTS:  Results Reviewed:   Imaging Personally Reviewed:  Electrocardiogram Personally Reviewed:    COORDINATION OF CARE:  Care Discussed with Consultants/Other Providers [Y/N]: GI service  Prior or Outpatient Records Reviewed [Y/N]:

## 2020-10-17 NOTE — PROGRESS NOTE ADULT - ASSESSMENT
89 year old woman with osteoporosis, HTN and left intertrochanteric fracture s/p IMN on 12/12/19 admitted for fall, failure to thrive, and dark stools with acute blood loss anemia secondary to severe PUD in setting of chronic ibuprofen use.     #Peptic ulcer disease secondary to NSAID overuse: Single large, cratered duodenal ulcer with high-risk stigmata for bleeding; however no interventions for prevention of re-bleeding performed due to perceived risk of small bowel perforation (ulcer is deeply cratered).  #History of L. intertrochanteric fracture s/p IMN 12/2019 with chronic hip pain requiring NSAID overuse   #Underweight with BMI <19.0  #Hypertension    Recommendations:  - continue regular diet today  - continue Protonix 40 mg PO BID; should be continued for 30 days outpatient upon discharge  - follow up serum H pylori antibody; can treat with quadruple therapy as outpatient upon discharge   - continue monitoring of Hb on daily CBCs and bowel movements to assess for re-bleeding  - as patient remains at high risk for re-bleeding, would repeat upper endoscopy for downtrending Hb/additional pRBC transfusions/recurrent melena to attempt possible interventions  - non-NSAIDS analgesics for pain control only  - GI to follow      Edelmira Avery PGY-5  Gastroenterology Fellow  Page #[phone]   Page #[phone] 5pm-7am on weekdays, and on weekends   89 year old woman with osteoporosis, HTN and left intertrochanteric fracture s/p IMN on 12/12/19 admitted for fall, failure to thrive, and dark stools with acute blood loss anemia secondary to severe PUD in setting of chronic ibuprofen use.     #Acute blood loss anemia secondary to peptic ulcer disease secondary to NSAID overuse: Single large, cratered duodenal ulcer with high-risk stigmata for bleeding; however no interventions for prevention of re-bleeding performed due to perceived risk of small bowel perforation (ulcer is deeply cratered).  #History of L. intertrochanteric fracture s/p IMN 12/2019 with chronic hip pain requiring NSAID overuse   #Underweight with BMI <19.0  #Hypertension    Recommendations:  - continue regular diet today  - continue Protonix 40 mg PO BID; should be continued for 30 days outpatient upon discharge  - follow up serum H pylori antibody; can treat with quadruple therapy as outpatient upon discharge   - continue monitoring of Hb on daily CBCs and bowel movements to assess for re-bleeding  - as patient remains at high risk for re-bleeding, would repeat upper endoscopy for downtrending Hb/additional pRBC transfusions/recurrent melena to attempt possible interventions  - non-NSAIDS analgesics for pain control only  - GI to follow      Edelmira Avery PGY-5  Gastroenterology Fellow  Page #[phone]   Page #[phone] 5pm-7am on weekdays, and on weekends

## 2020-10-17 NOTE — PROGRESS NOTE ADULT - SUBJECTIVE AND OBJECTIVE BOX
Chief Complaint:  Patient is a 89y old  Female who presents with a chief complaint of Fall (17 Oct 2020 07:22)      Interval Events:     Advancee to regular diet yesterday. Patient states she ate little solid food but had no post-prandial symptoms.   No bowel movements since admission but has been given Miralax.     Allergies:  No Known Allergies      Hospital Medications:  acetaminophen   Tablet .. 650 milliGRAM(s) Oral every 6 hours PRN  amLODIPine   Tablet 10 milliGRAM(s) Oral daily  brimonidine 0.2% Ophthalmic Solution 1 Drop(s) Both EYES two times a day  influenza   Vaccine 0.5 milliLiter(s) IntraMuscular once  influenza  Vaccine (HIGH DOSE) 0.7 milliLiter(s) IntraMuscular once  lisinopril 40 milliGRAM(s) Oral daily  multivitamin/minerals/iron Oral Solution (CENTRUM) 15 milliLiter(s) Oral daily  pantoprazole    Tablet 40 milliGRAM(s) Oral two times a day  polyethylene glycol 3350 17 Gram(s) Oral daily  senna 2 Tablet(s) Oral at bedtime  sodium chloride 0.9%. 1000 milliLiter(s) IV Continuous <Continuous>  sodium phosphate IVPB 30 milliMole(s) IV Intermittent once  sucralfate 1 Gram(s) Oral four times a day  timolol 0.5% Solution 1 Drop(s) Both EYES two times a day      PMHX/PSHX:  HTN (hypertension)    History of hip surgery    No significant past surgical history        Family history:      ROS:     General:  No weight loss, fevers, chills, night sweats, fatigue   Eyes:  No vision changes  ENT:  No sore throat, pain, runny nose  CV:  No chest pain, palpitations, dizziness   Resp:  No SOB, cough, wheezing  GI:  See HPI  :  No burning with urination, hematuria  Muscle:  No pain, weakness  Neuro:  No weakness/tingling, memory problems  Psych:  No fatigue, insomnia, mood problems, depression  Heme:  No easy bruisability  Skin:  No rash, edema      PHYSICAL EXAM:     GENERAL:  Elderly, frail, no distress, eating breakfast   HEENT:  Conjunctivae clear, sclera anicteric  CHEST:  Full & symmetric excursion, no increased effort w/ respirations  HEART:  Regular rhythm & rate  ABDOMEN:  Soft, non-tender, non-distended  EXTREMITIES:  no LE  edema  SKIN:  No rash/erythema/ecchymoses/petechiae/wounds/jaundice  NEURO:  Alert, orientedx3    Vital Signs:  Vital Signs Last 24 Hrs  T(C): 36.2 (17 Oct 2020 05:29), Max: 37.2 (16 Oct 2020 21:20)  T(F): 97.1 (17 Oct 2020 05:29), Max: 99 (16 Oct 2020 21:20)  HR: 72 (17 Oct 2020 05:29) (66 - 72)  BP: 162/60 (17 Oct 2020 05:29) (115/55 - 162/60)  BP(mean): --  RR: 19 (17 Oct 2020 05:29) (18 - 19)  SpO2: 100% (17 Oct 2020 05:29) (98% - 100%)  Daily     Daily     LABS:                        9.1    5.04  )-----------( 637      ( 17 Oct 2020 07:35 )             28.9     10-17    130<L>  |  101  |  12  ----------------------------<  105<H>  4.5   |  19<L>  |  0.73    Ca    8.2<L>      17 Oct 2020 07:35  Phos  1.9     10-17  Mg     1.9     10-17    TPro  7.2  /  Alb  2.7<L>  /  TBili  0.2  /  DBili  x   /  AST  11  /  ALT  < 5  /  AlkPhos  69  10-16    LIVER FUNCTIONS - ( 16 Oct 2020 11:14 )  Alb: 2.7 g/dL / Pro: 7.2 g/dL / ALK PHOS: 69 u/L / ALT: < 5 u/L / AST: 11 u/L / GGT: x           Imaging:             Chief Complaint:  Patient is a 89y old  Female who presents with a chief complaint of Fall (17 Oct 2020 07:22)      Interval Events:     Advanced to regular diet yesterday. Patient states she ate little solid food but had no post-prandial symptoms.   No bowel movements since admission but has been given Miralax.     Allergies:  No Known Allergies      Hospital Medications:  acetaminophen   Tablet .. 650 milliGRAM(s) Oral every 6 hours PRN  amLODIPine   Tablet 10 milliGRAM(s) Oral daily  brimonidine 0.2% Ophthalmic Solution 1 Drop(s) Both EYES two times a day  influenza   Vaccine 0.5 milliLiter(s) IntraMuscular once  influenza  Vaccine (HIGH DOSE) 0.7 milliLiter(s) IntraMuscular once  lisinopril 40 milliGRAM(s) Oral daily  multivitamin/minerals/iron Oral Solution (CENTRUM) 15 milliLiter(s) Oral daily  pantoprazole    Tablet 40 milliGRAM(s) Oral two times a day  polyethylene glycol 3350 17 Gram(s) Oral daily  senna 2 Tablet(s) Oral at bedtime  sodium chloride 0.9%. 1000 milliLiter(s) IV Continuous <Continuous>  sodium phosphate IVPB 30 milliMole(s) IV Intermittent once  sucralfate 1 Gram(s) Oral four times a day  timolol 0.5% Solution 1 Drop(s) Both EYES two times a day      PMHX/PSHX:  HTN (hypertension)    History of hip surgery    No significant past surgical history        Family history:      ROS:     General:  No weight loss, fevers, chills, night sweats, fatigue   Eyes:  No vision changes  ENT:  No sore throat, pain, runny nose  CV:  No chest pain, palpitations, dizziness   Resp:  No SOB, cough, wheezing  GI:  See HPI  :  No burning with urination, hematuria  Muscle:  No pain, weakness  Neuro:  No weakness/tingling, memory problems  Psych:  No fatigue, insomnia, mood problems, depression  Heme:  No easy bruisability  Skin:  No rash, edema      PHYSICAL EXAM:     GENERAL:  Elderly, frail, no distress, eating breakfast   HEENT:  Conjunctivae clear, sclera anicteric  CHEST:  Full & symmetric excursion, no increased effort w/ respirations  HEART:  Regular rhythm & rate  ABDOMEN:  Soft, non-tender, non-distended  EXTREMITIES:  no LE  edema  SKIN:  No rash/erythema/ecchymoses/petechiae/wounds/jaundice  NEURO:  Alert, orientedx3    Vital Signs:  Vital Signs Last 24 Hrs  T(C): 36.2 (17 Oct 2020 05:29), Max: 37.2 (16 Oct 2020 21:20)  T(F): 97.1 (17 Oct 2020 05:29), Max: 99 (16 Oct 2020 21:20)  HR: 72 (17 Oct 2020 05:29) (66 - 72)  BP: 162/60 (17 Oct 2020 05:29) (115/55 - 162/60)  BP(mean): --  RR: 19 (17 Oct 2020 05:29) (18 - 19)  SpO2: 100% (17 Oct 2020 05:29) (98% - 100%)  Daily     Daily     LABS:                        9.1    5.04  )-----------( 637      ( 17 Oct 2020 07:35 )             28.9     10-17    130<L>  |  101  |  12  ----------------------------<  105<H>  4.5   |  19<L>  |  0.73    Ca    8.2<L>      17 Oct 2020 07:35  Phos  1.9     10-17  Mg     1.9     10-17    TPro  7.2  /  Alb  2.7<L>  /  TBili  0.2  /  DBili  x   /  AST  11  /  ALT  < 5  /  AlkPhos  69  10-16    LIVER FUNCTIONS - ( 16 Oct 2020 11:14 )  Alb: 2.7 g/dL / Pro: 7.2 g/dL / ALK PHOS: 69 u/L / ALT: < 5 u/L / AST: 11 u/L / GGT: x           Imaging:

## 2020-10-18 DIAGNOSIS — E87.1 HYPO-OSMOLALITY AND HYPONATREMIA: ICD-10-CM

## 2020-10-18 LAB
ANION GAP SERPL CALC-SCNC: 10 MMO/L — SIGNIFICANT CHANGE UP (ref 7–14)
BUN SERPL-MCNC: 14 MG/DL — SIGNIFICANT CHANGE UP (ref 7–23)
CALCIUM SERPL-MCNC: 8.5 MG/DL — SIGNIFICANT CHANGE UP (ref 8.4–10.5)
CHLORIDE SERPL-SCNC: 98 MMOL/L — SIGNIFICANT CHANGE UP (ref 98–107)
CO2 SERPL-SCNC: 20 MMOL/L — LOW (ref 22–31)
CREAT SERPL-MCNC: 0.71 MG/DL — SIGNIFICANT CHANGE UP (ref 0.5–1.3)
GLUCOSE SERPL-MCNC: 77 MG/DL — SIGNIFICANT CHANGE UP (ref 70–99)
H PYLORI IGG SER-ACNC: 8.4 UNITS — SIGNIFICANT CHANGE UP
HCT VFR BLD CALC: 27.3 % — LOW (ref 34.5–45)
HGB BLD-MCNC: 8.8 G/DL — LOW (ref 11.5–15.5)
MAGNESIUM SERPL-MCNC: 2 MG/DL — SIGNIFICANT CHANGE UP (ref 1.6–2.6)
MCHC RBC-ENTMCNC: 28.7 PG — SIGNIFICANT CHANGE UP (ref 27–34)
MCHC RBC-ENTMCNC: 32.2 % — SIGNIFICANT CHANGE UP (ref 32–36)
MCV RBC AUTO: 88.9 FL — SIGNIFICANT CHANGE UP (ref 80–100)
NRBC # FLD: 0 K/UL — SIGNIFICANT CHANGE UP (ref 0–0)
PHOSPHATE SERPL-MCNC: 2.2 MG/DL — LOW (ref 2.5–4.5)
PLATELET # BLD AUTO: 650 K/UL — HIGH (ref 150–400)
PMV BLD: 9.1 FL — SIGNIFICANT CHANGE UP (ref 7–13)
POTASSIUM SERPL-MCNC: 4.3 MMOL/L — SIGNIFICANT CHANGE UP (ref 3.5–5.3)
POTASSIUM SERPL-SCNC: 4.3 MMOL/L — SIGNIFICANT CHANGE UP (ref 3.5–5.3)
RBC # BLD: 3.07 M/UL — LOW (ref 3.8–5.2)
RBC # FLD: 13 % — SIGNIFICANT CHANGE UP (ref 10.3–14.5)
SODIUM SERPL-SCNC: 128 MMOL/L — LOW (ref 135–145)
WBC # BLD: 5.75 K/UL — SIGNIFICANT CHANGE UP (ref 3.8–10.5)
WBC # FLD AUTO: 5.75 K/UL — SIGNIFICANT CHANGE UP (ref 3.8–10.5)

## 2020-10-18 PROCEDURE — 99233 SBSQ HOSP IP/OBS HIGH 50: CPT | Mod: GC

## 2020-10-18 RX ADMIN — Medication 1 GRAM(S): at 06:30

## 2020-10-18 RX ADMIN — SENNA PLUS 2 TABLET(S): 8.6 TABLET ORAL at 21:49

## 2020-10-18 RX ADMIN — Medication 1 DROP(S): at 06:29

## 2020-10-18 RX ADMIN — AMLODIPINE BESYLATE 10 MILLIGRAM(S): 2.5 TABLET ORAL at 08:43

## 2020-10-18 RX ADMIN — PANTOPRAZOLE SODIUM 40 MILLIGRAM(S): 20 TABLET, DELAYED RELEASE ORAL at 06:30

## 2020-10-18 RX ADMIN — Medication 1 GRAM(S): at 13:20

## 2020-10-18 RX ADMIN — BRIMONIDINE TARTRATE 1 DROP(S): 2 SOLUTION/ DROPS OPHTHALMIC at 18:13

## 2020-10-18 RX ADMIN — PANTOPRAZOLE SODIUM 40 MILLIGRAM(S): 20 TABLET, DELAYED RELEASE ORAL at 18:12

## 2020-10-18 RX ADMIN — LISINOPRIL 40 MILLIGRAM(S): 2.5 TABLET ORAL at 08:43

## 2020-10-18 RX ADMIN — Medication 650 MILLIGRAM(S): at 14:12

## 2020-10-18 RX ADMIN — BRIMONIDINE TARTRATE 1 DROP(S): 2 SOLUTION/ DROPS OPHTHALMIC at 06:29

## 2020-10-18 RX ADMIN — Medication 650 MILLIGRAM(S): at 13:20

## 2020-10-18 RX ADMIN — Medication 63.75 MILLIMOLE(S): at 08:42

## 2020-10-18 RX ADMIN — Medication 1 DROP(S): at 18:12

## 2020-10-18 RX ADMIN — Medication 1 GRAM(S): at 18:12

## 2020-10-18 RX ADMIN — Medication 15 MILLILITER(S): at 13:20

## 2020-10-18 RX ADMIN — SODIUM CHLORIDE 75 MILLILITER(S): 9 INJECTION INTRAMUSCULAR; INTRAVENOUS; SUBCUTANEOUS at 06:30

## 2020-10-18 NOTE — PROGRESS NOTE ADULT - PROBLEM SELECTOR PLAN 3
endorses poor appetite for past 2 months with loss of taste in recent weeks  -appreciate dietician recs   -multivitamin solution for nutritional deficiency Hyponatremia to 128, unclear etiology  -f/u urine osm, serum osm and electrolytes  -dc fluids for now

## 2020-10-18 NOTE — PROGRESS NOTE ADULT - PROBLEM SELECTOR PLAN 6
improved with 70mEq supplement on admission, 2.4 --> 4.1  -monitor daily BMP in target BP, continue to monitor  -continue home meds amlodipine and lisinopril  -if diastolic BP persistently <60 and Pt Asx, stagger med timing

## 2020-10-18 NOTE — PROGRESS NOTE ADULT - PROBLEM SELECTOR PLAN 2
GIB likely due to NSAID induce gastric ulcers  -egd found large duodenal ulcer 40mm diameter likely source of melena and two smaller duodenal ulcers <8mm. No biopsies taken due to risk of bowel perforation  -H Pylori negative   -tx sulcrafate qid, pantoprazole PO BID, manage pain with non-NSAIDs  -maintain type and screen  -monitor daily CBC, Hb improving and pt remains stable  -regular diet GIB likely due to NSAID induce gastric ulcers  -egd found large duodenal ulcer 40mm diameter likely source of melena and two smaller duodenal ulcers <8mm. No biopsies taken due to risk of bowel perforation  -H Pylori negative   -tx sulcrafate qid, pantoprazole PO BID, manage pain with non-NSAIDs  -maintain type and screen  -monitor daily CBC, Hb improving and pt remains stable  -regular diet  -Hb slightly downtrending, and now getting type and sreen for 10/19.

## 2020-10-18 NOTE — PROGRESS NOTE ADULT - PROBLEM SELECTOR PLAN 5
in target BP, continue to monitor  -continue home meds amlodipine and lisinopril  -if diastolic BP persistently <60 and Pt Asx, stagger med timing eGFR (AA) of 47 on admission, 36 in Dec 2019. normocytic anemia, high ferritin and thrombocytosis in setting of normal iron studies suggest anemia of chronic inflammation or CKD.   -mgmt of GIB per above and avoid NSAIDs

## 2020-10-18 NOTE — PROGRESS NOTE ADULT - PROBLEM SELECTOR PLAN 7
VTE ppx: mechanical SCD only due to GIB  Diet: cld - adv to fld then regular as tolerated  -appreciate dietician recs  Dispo planning: PT and OT rec rehab improved with 70mEq supplement on admission, 2.4 --> 4.1  -monitor daily BMP

## 2020-10-18 NOTE — PROGRESS NOTE ADULT - SUBJECTIVE AND OBJECTIVE BOX
**************************************************************  Boaz Bailey, PGY2  Internal Medicine   pager: NS: [phone] [facility]: [phone]  ***************************************************************    PROGRESS NOTE:     Patient is a 89y old  Female who presents with a chief complaint of Fall (17 Oct 2020 10:45)      SUBJECTIVE / OVERNIGHT EVENTS:  No acute events overnight.     ADDITIONAL REVIEW OF SYSTEMS: 10 point ROS negative except per HPI    MEDICATIONS  (STANDING):  amLODIPine   Tablet 10 milliGRAM(s) Oral daily  brimonidine 0.2% Ophthalmic Solution 1 Drop(s) Both EYES two times a day  influenza   Vaccine 0.5 milliLiter(s) IntraMuscular once  influenza  Vaccine (HIGH DOSE) 0.7 milliLiter(s) IntraMuscular once  lisinopril 40 milliGRAM(s) Oral daily  multivitamin/minerals/iron Oral Solution (CENTRUM) 15 milliLiter(s) Oral daily  pantoprazole    Tablet 40 milliGRAM(s) Oral two times a day  polyethylene glycol 3350 17 Gram(s) Oral daily  senna 2 Tablet(s) Oral at bedtime  sodium chloride 0.9%. 1000 milliLiter(s) (75 mL/Hr) IV Continuous <Continuous>  sodium chloride 0.9%. 1000 milliLiter(s) (75 mL/Hr) IV Continuous <Continuous>  sucralfate 1 Gram(s) Oral four times a day  timolol 0.5% Solution 1 Drop(s) Both EYES two times a day    MEDICATIONS  (PRN):  acetaminophen   Tablet .. 650 milliGRAM(s) Oral every 6 hours PRN Temp greater or equal to 38C (100.4F), Mild Pain (1 - 3)      CAPILLARY BLOOD GLUCOSE        I&O's Summary    17 Oct 2020 07:01  -  18 Oct 2020 07:00  --------------------------------------------------------  IN: 1750 mL / OUT: 0 mL / NET: 1750 mL        PHYSICAL EXAM:  Vital Signs Last 24 Hrs  T(C): 36.5 (18 Oct 2020 06:51), Max: 36.6 (17 Oct 2020 21:35)  T(F): 97.7 (18 Oct 2020 06:51), Max: 97.8 (17 Oct 2020 21:35)  HR: 60 (18 Oct 2020 06:51) (60 - 72)  BP: 134/45 (18 Oct 2020 06:51) (110/60 - 134/45)  BP(mean): --  RR: 18 (18 Oct 2020 06:51) (18 - 18)  SpO2: 99% (18 Oct 2020 06:51) (98% - 100%)    CONSTITUTIONAL: NAD, well-developed  RESPIRATORY: Normal respiratory effort; lungs are clear to auscultation bilaterally  CARDIOVASCULAR: Regular rate and rhythm, normal S1 and S2, no murmur/rub/gallop; No lower extremity edema; Peripheral pulses are 2+ bilaterally  ABDOMEN: Nontender to palpation, normoactive bowel sounds, no rebound/guarding; No hepatosplenomegaly  MUSCLOSKELETAL: no clubbing or cyanosis of digits; no joint swelling or tenderness to palpation  PSYCH: A+O to person, place, and time; affect appropriate    LABS:                        9.1    5.04  )-----------( 637      ( 17 Oct 2020 07:35 )             28.9     10-17    130<L>  |  101  |  12  ----------------------------<  105<H>  4.5   |  19<L>  |  0.73    Ca    8.2<L>      17 Oct 2020 07:35  Phos  1.9     10-17  Mg     1.9     10-17    TPro  7.2  /  Alb  2.7<L>  /  TBili  0.2  /  DBili  x   /  AST  11  /  ALT  < 5  /  AlkPhos  69  10-16                RADIOLOGY & ADDITIONAL TESTS:  Results Reviewed:   Imaging Personally Reviewed:  Electrocardiogram Personally Reviewed:    COORDINATION OF CARE:  Care Discussed with Consultants/Other Providers [Y/N]:  Prior or Outpatient Records Reviewed [Y/N]:   **************************************************************  Boaz Loydchance, PGY2  Internal Medicine   pager: NS: [phone] [facility]: [phone]  ***************************************************************    PROGRESS NOTE:     Patient is a 89y old  Female who presents with a chief complaint of Fall (17 Oct 2020 10:45)      SUBJECTIVE / OVERNIGHT EVENTS:  No acute events overnight. Patient denies any abd pain/n/v/sob/chest pain.     ADDITIONAL REVIEW OF SYSTEMS: 10 point ROS negative except per HPI    MEDICATIONS  (STANDING):  amLODIPine   Tablet 10 milliGRAM(s) Oral daily  brimonidine 0.2% Ophthalmic Solution 1 Drop(s) Both EYES two times a day  influenza   Vaccine 0.5 milliLiter(s) IntraMuscular once  influenza  Vaccine (HIGH DOSE) 0.7 milliLiter(s) IntraMuscular once  lisinopril 40 milliGRAM(s) Oral daily  multivitamin/minerals/iron Oral Solution (CENTRUM) 15 milliLiter(s) Oral daily  pantoprazole    Tablet 40 milliGRAM(s) Oral two times a day  polyethylene glycol 3350 17 Gram(s) Oral daily  senna 2 Tablet(s) Oral at bedtime  sodium chloride 0.9%. 1000 milliLiter(s) (75 mL/Hr) IV Continuous <Continuous>  sodium chloride 0.9%. 1000 milliLiter(s) (75 mL/Hr) IV Continuous <Continuous>  sucralfate 1 Gram(s) Oral four times a day  timolol 0.5% Solution 1 Drop(s) Both EYES two times a day    MEDICATIONS  (PRN):  acetaminophen   Tablet .. 650 milliGRAM(s) Oral every 6 hours PRN Temp greater or equal to 38C (100.4F), Mild Pain (1 - 3)      CAPILLARY BLOOD GLUCOSE        I&O's Summary    17 Oct 2020 07:01  -  18 Oct 2020 07:00  --------------------------------------------------------  IN: 1750 mL / OUT: 0 mL / NET: 1750 mL        PHYSICAL EXAM:  Vital Signs Last 24 Hrs  T(C): 36.5 (18 Oct 2020 06:51), Max: 36.6 (17 Oct 2020 21:35)  T(F): 97.7 (18 Oct 2020 06:51), Max: 97.8 (17 Oct 2020 21:35)  HR: 60 (18 Oct 2020 06:51) (60 - 72)  BP: 134/45 (18 Oct 2020 06:51) (110/60 - 134/45)  BP(mean): --  RR: 18 (18 Oct 2020 06:51) (18 - 18)  SpO2: 99% (18 Oct 2020 06:51) (98% - 100%)    CONSTITUTIONAL: NAD, well-developed  RESPIRATORY: Normal respiratory effort; lungs are clear to auscultation bilaterally  CARDIOVASCULAR: Regular rate and rhythm, normal S1 and S2, no murmur/rub/gallop; No lower extremity edema; Peripheral pulses are 2+ bilaterally  ABDOMEN: Nontender to palpation, normoactive bowel sounds, no rebound/guarding; No hepatosplenomegaly  MUSCLOSKELETAL: no clubbing or cyanosis of digits; no joint swelling or tenderness to palpation  PSYCH: A+O to person, place, and time; affect appropriate    LABS:                        9.1    5.04  )-----------( 637      ( 17 Oct 2020 07:35 )             28.9     10-17    130<L>  |  101  |  12  ----------------------------<  105<H>  4.5   |  19<L>  |  0.73    Ca    8.2<L>      17 Oct 2020 07:35  Phos  1.9     10-17  Mg     1.9     10-17    TPro  7.2  /  Alb  2.7<L>  /  TBili  0.2  /  DBili  x   /  AST  11  /  ALT  < 5  /  AlkPhos  69  10-16      RADIOLOGY & ADDITIONAL TESTS:  Results Reviewed:   Imaging Personally Reviewed:  Electrocardiogram Personally Reviewed:    COORDINATION OF CARE:  Care Discussed with Consultants/Other Providers [Y/N]:  Prior or Outpatient Records Reviewed [Y/N]:

## 2020-10-18 NOTE — PROGRESS NOTE ADULT - ASSESSMENT
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 Yes

## 2020-10-18 NOTE — PROGRESS NOTE ADULT - PROBLEM SELECTOR PLAN 4
eGFR (AA) of 47 on admission, 36 in Dec 2019. normocytic anemia, high ferritin and thrombocytosis in setting of normal iron studies suggest anemia of chronic inflammation or CKD.   -mgmt of GIB per above and avoid NSAIDs endorses poor appetite for past 2 months with loss of taste in recent weeks  -appreciate dietician recs   -multivitamin solution for nutritional deficiency

## 2020-10-19 LAB
ANION GAP SERPL CALC-SCNC: 10 MMO/L — SIGNIFICANT CHANGE UP (ref 7–14)
BLD GP AB SCN SERPL QL: NEGATIVE — SIGNIFICANT CHANGE UP
BUN SERPL-MCNC: 14 MG/DL — SIGNIFICANT CHANGE UP (ref 7–23)
CALCIUM SERPL-MCNC: 8.5 MG/DL — SIGNIFICANT CHANGE UP (ref 8.4–10.5)
CHLORIDE SERPL-SCNC: 99 MMOL/L — SIGNIFICANT CHANGE UP (ref 98–107)
CHLORIDE UR-SCNC: 36 MMOL/L — SIGNIFICANT CHANGE UP
CO2 SERPL-SCNC: 19 MMOL/L — LOW (ref 22–31)
CREAT ?TM UR-MCNC: 35.7 MG/DL — SIGNIFICANT CHANGE UP
CREAT SERPL-MCNC: 0.65 MG/DL — SIGNIFICANT CHANGE UP (ref 0.5–1.3)
GLUCOSE SERPL-MCNC: 79 MG/DL — SIGNIFICANT CHANGE UP (ref 70–99)
HCT VFR BLD CALC: 26.1 % — LOW (ref 34.5–45)
HGB BLD-MCNC: 8.5 G/DL — LOW (ref 11.5–15.5)
MAGNESIUM SERPL-MCNC: 1.9 MG/DL — SIGNIFICANT CHANGE UP (ref 1.6–2.6)
MCHC RBC-ENTMCNC: 29.2 PG — SIGNIFICANT CHANGE UP (ref 27–34)
MCHC RBC-ENTMCNC: 32.6 % — SIGNIFICANT CHANGE UP (ref 32–36)
MCV RBC AUTO: 89.7 FL — SIGNIFICANT CHANGE UP (ref 80–100)
NRBC # FLD: 0 K/UL — SIGNIFICANT CHANGE UP (ref 0–0)
OSMOLALITY UR: 238 MOSMO/KG — SIGNIFICANT CHANGE UP (ref 50–1200)
PHOSPHATE SERPL-MCNC: 2.5 MG/DL — SIGNIFICANT CHANGE UP (ref 2.5–4.5)
PLATELET # BLD AUTO: 616 K/UL — HIGH (ref 150–400)
PMV BLD: 8.9 FL — SIGNIFICANT CHANGE UP (ref 7–13)
POTASSIUM SERPL-MCNC: 5.1 MMOL/L — SIGNIFICANT CHANGE UP (ref 3.5–5.3)
POTASSIUM SERPL-SCNC: 5.1 MMOL/L — SIGNIFICANT CHANGE UP (ref 3.5–5.3)
POTASSIUM UR-SCNC: 27.1 MMOL/L — SIGNIFICANT CHANGE UP
RBC # BLD: 2.91 M/UL — LOW (ref 3.8–5.2)
RBC # FLD: 12.9 % — SIGNIFICANT CHANGE UP (ref 10.3–14.5)
RH IG SCN BLD-IMP: POSITIVE — SIGNIFICANT CHANGE UP
SARS-COV-2 RNA SPEC QL NAA+PROBE: SIGNIFICANT CHANGE UP
SODIUM SERPL-SCNC: 128 MMOL/L — LOW (ref 135–145)
SODIUM UR-SCNC: 32 MMOL/L — SIGNIFICANT CHANGE UP
WBC # BLD: 5.3 K/UL — SIGNIFICANT CHANGE UP (ref 3.8–10.5)
WBC # FLD AUTO: 5.3 K/UL — SIGNIFICANT CHANGE UP (ref 3.8–10.5)

## 2020-10-19 PROCEDURE — 99233 SBSQ HOSP IP/OBS HIGH 50: CPT | Mod: GC

## 2020-10-19 RX ADMIN — BRIMONIDINE TARTRATE 1 DROP(S): 2 SOLUTION/ DROPS OPHTHALMIC at 05:24

## 2020-10-19 RX ADMIN — Medication 15 MILLILITER(S): at 12:12

## 2020-10-19 RX ADMIN — Medication 1 GRAM(S): at 17:48

## 2020-10-19 RX ADMIN — Medication 1 GRAM(S): at 08:25

## 2020-10-19 RX ADMIN — Medication 63.75 MILLIMOLE(S): at 17:48

## 2020-10-19 RX ADMIN — Medication 1 DROP(S): at 05:25

## 2020-10-19 RX ADMIN — Medication 1 GRAM(S): at 00:05

## 2020-10-19 RX ADMIN — Medication 1 DROP(S): at 17:48

## 2020-10-19 RX ADMIN — BRIMONIDINE TARTRATE 1 DROP(S): 2 SOLUTION/ DROPS OPHTHALMIC at 17:48

## 2020-10-19 RX ADMIN — POLYETHYLENE GLYCOL 3350 17 GRAM(S): 17 POWDER, FOR SOLUTION ORAL at 13:04

## 2020-10-19 RX ADMIN — Medication 1 GRAM(S): at 12:12

## 2020-10-19 RX ADMIN — PANTOPRAZOLE SODIUM 40 MILLIGRAM(S): 20 TABLET, DELAYED RELEASE ORAL at 08:25

## 2020-10-19 RX ADMIN — Medication 650 MILLIGRAM(S): at 21:31

## 2020-10-19 RX ADMIN — PANTOPRAZOLE SODIUM 40 MILLIGRAM(S): 20 TABLET, DELAYED RELEASE ORAL at 17:48

## 2020-10-19 RX ADMIN — Medication 650 MILLIGRAM(S): at 22:31

## 2020-10-19 RX ADMIN — Medication 1 GRAM(S): at 23:29

## 2020-10-19 RX ADMIN — LISINOPRIL 40 MILLIGRAM(S): 2.5 TABLET ORAL at 08:25

## 2020-10-19 RX ADMIN — AMLODIPINE BESYLATE 10 MILLIGRAM(S): 2.5 TABLET ORAL at 08:24

## 2020-10-19 RX ADMIN — SENNA PLUS 2 TABLET(S): 8.6 TABLET ORAL at 21:31

## 2020-10-19 NOTE — PROGRESS NOTE ADULT - PROBLEM SELECTOR PLAN 1
h/o left intertrochanteric fracture s/p left femur intramedullary nail done on 12/12/2019 by Dr. Durán. 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 Associated numbness and weakness on LLE  -appreciate ortho recs - follow up outpatient  -pain mgmt with non-NSAIDs, has not taken PRN meds  -PT and OT rec rehab  -pending dispo to [facility] likely on Monday, COVID19 neg 10/15 Hyponatremia to 128, unclear etiology  -f/u urine osm, serum osm and electrolytes  -dc fluids for now Hyponatremia to 128, unclear etiology. Grossly euvolemic.  -f/u urine osm, serum osm and electrolytes  -dc fluids for now

## 2020-10-19 NOTE — PROGRESS NOTE ADULT - SUBJECTIVE AND OBJECTIVE BOX
PROGRESS NOTE:   Authored by Sharron Almeida MD  Pager: [facility] [phone]; [facility] [phone]    Patient is a 89y old  Female who presents with a chief complaint of Fall (18 Oct 2020 07:03)      SUBJECTIVE / OVERNIGHT EVENTS:    ADDITIONAL REVIEW OF SYSTEMS:  CONSTITUTIONAL: No weakness, fevers or chills  EYES/ENT: No visual changes;  No vertigo or throat pain   NECK: No pain or stiffness  RESPIRATORY: No cough, wheezing, hemoptysis; No shortness of breath  CARDIOVASCULAR: No chest pain or palpitations  GASTROINTESTINAL: No abdominal pain; nausea, vomiting;  diarrhea or constipation. No hemetemesis, melena or hematochezia.  GENITOURINARY: No dysuria, frequency or hematuria  NEUROLOGICAL: No numbness or weakness  SKIN: No itching, burning, rashes, or lesions     MEDICATIONS  (STANDING):  amLODIPine   Tablet 10 milliGRAM(s) Oral daily  brimonidine 0.2% Ophthalmic Solution 1 Drop(s) Both EYES two times a day  influenza   Vaccine 0.5 milliLiter(s) IntraMuscular once  influenza  Vaccine (HIGH DOSE) 0.7 milliLiter(s) IntraMuscular once  lisinopril 40 milliGRAM(s) Oral daily  multivitamin/minerals/iron Oral Solution (CENTRUM) 15 milliLiter(s) Oral daily  pantoprazole    Tablet 40 milliGRAM(s) Oral two times a day  polyethylene glycol 3350 17 Gram(s) Oral daily  senna 2 Tablet(s) Oral at bedtime  sucralfate 1 Gram(s) Oral four times a day  timolol 0.5% Solution 1 Drop(s) Both EYES two times a day    MEDICATIONS  (PRN):  acetaminophen   Tablet .. 650 milliGRAM(s) Oral every 6 hours PRN Temp greater or equal to 38C (100.4F), Mild Pain (1 - 3)      CAPILLARY BLOOD GLUCOSE        I&O's Summary      PHYSICAL EXAM:  Vital Signs Last 24 Hrs  T(C): 36.4 (19 Oct 2020 05:14), Max: 37.1 (18 Oct 2020 21:05)  T(F): 97.6 (19 Oct 2020 05:14), Max: 98.7 (18 Oct 2020 21:05)  HR: 60 (18 Oct 2020 21:05) (60 - 71)  BP: 112/55 (19 Oct 2020 05:14) (110/50 - 137/53)  BP(mean): --  RR: 18 (19 Oct 2020 05:14) (16 - 18)  SpO2: 95% (19 Oct 2020 05:14) (95% - 100%)    CONSTITUTIONAL: NAD, well-developed  RESPIRATORY: Normal respiratory effort; lungs are clear to auscultation bilaterally  CARDIOVASCULAR: Regular rate and rhythm, normal S1 and S2, no murmur/rub/gallop; No lower extremity edema; Peripheral pulses are 2+ bilaterally  ABDOMEN: Nontender to palpation, normoactive bowel sounds, no rebound/guarding; No hepatosplenomegaly  MUSCLOSKELETAL: no clubbing or cyanosis of digits; no joint swelling or tenderness to palpation  PSYCH: A+O to person, place, and time; affect appropriate    LABS:                        8.8    5.75  )-----------( 650      ( 18 Oct 2020 05:58 )             27.3     10-18    128<L>  |  98  |  14  ----------------------------<  77  4.3   |  20<L>  |  0.71    Ca    8.5      18 Oct 2020 05:58  Phos  2.2     10-18  Mg     2.0     10-18                  RADIOLOGY & ADDITIONAL TESTS:  Results Reviewed:   Imaging Personally Reviewed:  Electrocardiogram Personally Reviewed:    COORDINATION OF CARE:  Care Discussed with Consultants/Other Providers [Y/N]:  Prior or Outpatient Records Reviewed [Y/N]:   PROGRESS NOTE:   Authored by Sharron Almeida MD  Pager: [facility] [phone]; [facility] [phone]    Patient is a 89y old  Female who presents with a chief complaint of Fall (18 Oct 2020 07:03)      SUBJECTIVE / OVERNIGHT EVENTS: overnight no acute events. Pt has not had any BM. L hip pain controlled without PRN pain meds. AOx3. Appetite remains fair, reports eating 75% tray of regular diet, denies associated cough, reflux, spit up, or signs of dysphagia. Encouraged Pt to stay hydrated and drink fluids.    ADDITIONAL REVIEW OF SYSTEMS:  CONSTITUTIONAL: No weakness, fevers or chills  EYES/ENT: No visual changes;  No vertigo or throat pain   NECK: No pain or stiffness  RESPIRATORY: No cough, wheezing, hemoptysis; No shortness of breath  CARDIOVASCULAR: No chest pain or palpitations  GASTROINTESTINAL: + constipation. No abdominal pain; nausea, vomiting;  constipation. No hemetemesis, melena or hematochezia.  GENITOURINARY: No dysuria, frequency or hematuria  NEUROLOGICAL: No numbness or weakness. No confusion  SKIN: No itching, burning, rashes, or lesions     MEDICATIONS  (STANDING):  amLODIPine   Tablet 10 milliGRAM(s) Oral daily  brimonidine 0.2% Ophthalmic Solution 1 Drop(s) Both EYES two times a day  influenza   Vaccine 0.5 milliLiter(s) IntraMuscular once  influenza  Vaccine (HIGH DOSE) 0.7 milliLiter(s) IntraMuscular once  lisinopril 40 milliGRAM(s) Oral daily  multivitamin/minerals/iron Oral Solution (CENTRUM) 15 milliLiter(s) Oral daily  pantoprazole    Tablet 40 milliGRAM(s) Oral two times a day  polyethylene glycol 3350 17 Gram(s) Oral daily  senna 2 Tablet(s) Oral at bedtime  sucralfate 1 Gram(s) Oral four times a day  timolol 0.5% Solution 1 Drop(s) Both EYES two times a day    MEDICATIONS  (PRN):  acetaminophen   Tablet .. 650 milliGRAM(s) Oral every 6 hours PRN Temp greater or equal to 38C (100.4F), Mild Pain (1 - 3)      CAPILLARY BLOOD GLUCOSE        I&O's Summary      PHYSICAL EXAM:  Vital Signs Last 24 Hrs  T(C): 36.4 (19 Oct 2020 05:14), Max: 37.1 (18 Oct 2020 21:05)  T(F): 97.6 (19 Oct 2020 05:14), Max: 98.7 (18 Oct 2020 21:05)  HR: 60 (18 Oct 2020 21:05) (60 - 71)  BP: 112/55 (19 Oct 2020 05:14) (110/50 - 137/53)  BP(mean): --  RR: 18 (19 Oct 2020 05:14) (16 - 18)  SpO2: 95% (19 Oct 2020 05:14) (95% - 100%)    CONSTITUTIONAL: NAD, well-developed  RESPIRATORY: Normal respiratory effort; lungs are clear to auscultation bilaterally  CARDIOVASCULAR: Regular rate and rhythm, normal S1 and S2, no murmur/rub/gallop; No lower extremity edema; Peripheral pulses are 2+ bilaterally  ABDOMEN: Nontender to palpation, normoactive bowel sounds, no rebound/guarding; No hepatosplenomegaly  MUSCLOSKELETAL: no clubbing or cyanosis of digits; no joint swelling or tenderness to palpation  PSYCH: A+O to person, place, and time; affect appropriate    LABS:                        8.8    5.75  )-----------( 650      ( 18 Oct 2020 05:58 )             27.3     10-18    128<L>  |  98  |  14  ----------------------------<  77  4.3   |  20<L>  |  0.71    Ca    8.5      18 Oct 2020 05:58  Phos  2.2     10-18  Mg     2.0     10-18                  RADIOLOGY & ADDITIONAL TESTS:  Results Reviewed:   Imaging Personally Reviewed:  Electrocardiogram Personally Reviewed:    COORDINATION OF CARE:  Care Discussed with Consultants/Other Providers [Y/N]:  Prior or Outpatient Records Reviewed [Y/N]:

## 2020-10-19 NOTE — PROGRESS NOTE ADULT - PROBLEM SELECTOR PLAN 2
GIB likely due to NSAID induce gastric ulcers  -egd found large duodenal ulcer 40mm diameter likely source of melena and two smaller duodenal ulcers <8mm. No biopsies taken due to risk of bowel perforation  -H Pylori negative   -tx sulcrafate qid, pantoprazole PO BID, manage pain with non-NSAIDs  -maintain type and screen  -monitor daily CBC, Hb improving and pt remains stable  -regular diet  -Hb slightly downtrending, and now getting type and sreen for 10/19. h/o left intertrochanteric fracture s/p left femur intramedullary nail done on 12/12/2019 by Dr. Durán. 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 Associated numbness and weakness on LLE  -appreciate ortho recs - follow up outpatient  -pain mgmt with non-NSAIDs, has not needed PRN meds  -PT and OT rec rehab  -pending dispo to [facility] likely tomorrow, COVID19 neg 10/15

## 2020-10-19 NOTE — PROGRESS NOTE ADULT - ASSESSMENT
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

## 2020-10-19 NOTE — PROGRESS NOTE ADULT - PROBLEM SELECTOR PLAN 4
endorses poor appetite for past 2 months with loss of taste in recent weeks  -appreciate dietician recs   -multivitamin solution for nutritional deficiency endorses poor appetite for past 2 months with loss of taste in past weeks  -continue to encourage PO intake and hydration  -appreciate dietician recs   -multivitamin solution for nutritional deficiency

## 2020-10-19 NOTE — PROGRESS NOTE ADULT - PROBLEM SELECTOR PLAN 8
VTE ppx: mechanical SCD only due to GIB  Diet: cld - adv to fld then regular as tolerated  -appreciate dietician recs  Dispo planning: PT and OT rec rehab VTE ppx: mechanical SCD only due to GIB  Diet: regular diet with 2 Ensure Enlive supplements  -appreciate dietician recs  Dispo planning: PT and OT rec rehab

## 2020-10-19 NOTE — PROGRESS NOTE ADULT - PROBLEM SELECTOR PLAN 3
Hyponatremia to 128, unclear etiology  -f/u urine osm, serum osm and electrolytes  -dc fluids for now GIB likely due to NSAID induce gastric ulcers  -egd found large duodenal ulcer 40mm diameter likely source of melena and two smaller duodenal ulcers <8mm. No biopsies taken due to risk of bowel perforation  -H Pylori negative   -tx sulcrafate qid, pantoprazole PO BID, manage pain with non-NSAIDs  -maintain type and screen, last done 10/19  -monitor daily CBC, pt remains stable, Hb slightly downtrending  -regular diet

## 2020-10-19 NOTE — PROGRESS NOTE ADULT - PROBLEM SELECTOR PLAN 5
Name band; eGFR (AA) of 47 on admission, 36 in Dec 2019. normocytic anemia, high ferritin and thrombocytosis in setting of normal iron studies suggest anemia of chronic inflammation or CKD.   -mgmt of GIB per above and avoid NSAIDs

## 2020-10-20 LAB
ANION GAP SERPL CALC-SCNC: 11 MMO/L — SIGNIFICANT CHANGE UP (ref 7–14)
ANION GAP SERPL CALC-SCNC: 8 MMO/L — SIGNIFICANT CHANGE UP (ref 7–14)
BUN SERPL-MCNC: 19 MG/DL — SIGNIFICANT CHANGE UP (ref 7–23)
BUN SERPL-MCNC: 21 MG/DL — SIGNIFICANT CHANGE UP (ref 7–23)
CALCIUM SERPL-MCNC: 7.9 MG/DL — LOW (ref 8.4–10.5)
CALCIUM SERPL-MCNC: 8.2 MG/DL — LOW (ref 8.4–10.5)
CHLORIDE SERPL-SCNC: 100 MMOL/L — SIGNIFICANT CHANGE UP (ref 98–107)
CHLORIDE SERPL-SCNC: 97 MMOL/L — LOW (ref 98–107)
CO2 SERPL-SCNC: 22 MMOL/L — SIGNIFICANT CHANGE UP (ref 22–31)
CO2 SERPL-SCNC: 23 MMOL/L — SIGNIFICANT CHANGE UP (ref 22–31)
CREAT SERPL-MCNC: 0.76 MG/DL — SIGNIFICANT CHANGE UP (ref 0.5–1.3)
CREAT SERPL-MCNC: 0.78 MG/DL — SIGNIFICANT CHANGE UP (ref 0.5–1.3)
GLUCOSE SERPL-MCNC: 122 MG/DL — HIGH (ref 70–99)
GLUCOSE SERPL-MCNC: 79 MG/DL — SIGNIFICANT CHANGE UP (ref 70–99)
HCT VFR BLD CALC: 24.5 % — LOW (ref 34.5–45)
HGB BLD-MCNC: 7.8 G/DL — LOW (ref 11.5–15.5)
MAGNESIUM SERPL-MCNC: 1.9 MG/DL — SIGNIFICANT CHANGE UP (ref 1.6–2.6)
MAGNESIUM SERPL-MCNC: 1.9 MG/DL — SIGNIFICANT CHANGE UP (ref 1.6–2.6)
MCHC RBC-ENTMCNC: 28.5 PG — SIGNIFICANT CHANGE UP (ref 27–34)
MCHC RBC-ENTMCNC: 31.8 % — LOW (ref 32–36)
MCV RBC AUTO: 89.4 FL — SIGNIFICANT CHANGE UP (ref 80–100)
NRBC # FLD: 0 K/UL — SIGNIFICANT CHANGE UP (ref 0–0)
OSMOLALITY SERPL: 274 MOSMO/KG — LOW (ref 275–295)
PHOSPHATE SERPL-MCNC: 1.8 MG/DL — LOW (ref 2.5–4.5)
PHOSPHATE SERPL-MCNC: 2.6 MG/DL — SIGNIFICANT CHANGE UP (ref 2.5–4.5)
PLATELET # BLD AUTO: 587 K/UL — HIGH (ref 150–400)
PMV BLD: 9.2 FL — SIGNIFICANT CHANGE UP (ref 7–13)
POTASSIUM SERPL-MCNC: 4.1 MMOL/L — SIGNIFICANT CHANGE UP (ref 3.5–5.3)
POTASSIUM SERPL-MCNC: 4.3 MMOL/L — SIGNIFICANT CHANGE UP (ref 3.5–5.3)
POTASSIUM SERPL-SCNC: 4.1 MMOL/L — SIGNIFICANT CHANGE UP (ref 3.5–5.3)
POTASSIUM SERPL-SCNC: 4.3 MMOL/L — SIGNIFICANT CHANGE UP (ref 3.5–5.3)
RBC # BLD: 2.74 M/UL — LOW (ref 3.8–5.2)
RBC # FLD: 13 % — SIGNIFICANT CHANGE UP (ref 10.3–14.5)
SODIUM SERPL-SCNC: 130 MMOL/L — LOW (ref 135–145)
SODIUM SERPL-SCNC: 131 MMOL/L — LOW (ref 135–145)
WBC # BLD: 4.64 K/UL — SIGNIFICANT CHANGE UP (ref 3.8–10.5)
WBC # FLD AUTO: 4.64 K/UL — SIGNIFICANT CHANGE UP (ref 3.8–10.5)

## 2020-10-20 PROCEDURE — 99233 SBSQ HOSP IP/OBS HIGH 50: CPT | Mod: GC

## 2020-10-20 PROCEDURE — 99232 SBSQ HOSP IP/OBS MODERATE 35: CPT | Mod: GC

## 2020-10-20 RX ORDER — SODIUM CHLORIDE 9 MG/ML
1000 INJECTION INTRAMUSCULAR; INTRAVENOUS; SUBCUTANEOUS
Refills: 0 | Status: DISCONTINUED | OUTPATIENT
Start: 2020-10-20 | End: 2020-10-21

## 2020-10-20 RX ADMIN — Medication 15 MILLILITER(S): at 11:19

## 2020-10-20 RX ADMIN — Medication 1 GRAM(S): at 23:10

## 2020-10-20 RX ADMIN — Medication 1 GRAM(S): at 05:29

## 2020-10-20 RX ADMIN — PANTOPRAZOLE SODIUM 40 MILLIGRAM(S): 20 TABLET, DELAYED RELEASE ORAL at 05:29

## 2020-10-20 RX ADMIN — Medication 1 GRAM(S): at 11:19

## 2020-10-20 RX ADMIN — SENNA PLUS 2 TABLET(S): 8.6 TABLET ORAL at 23:10

## 2020-10-20 RX ADMIN — Medication 1 DROP(S): at 17:44

## 2020-10-20 RX ADMIN — Medication 1 DROP(S): at 05:29

## 2020-10-20 RX ADMIN — SODIUM CHLORIDE 100 MILLILITER(S): 9 INJECTION INTRAMUSCULAR; INTRAVENOUS; SUBCUTANEOUS at 11:19

## 2020-10-20 RX ADMIN — BRIMONIDINE TARTRATE 1 DROP(S): 2 SOLUTION/ DROPS OPHTHALMIC at 05:29

## 2020-10-20 RX ADMIN — Medication 1 GRAM(S): at 17:44

## 2020-10-20 RX ADMIN — PANTOPRAZOLE SODIUM 40 MILLIGRAM(S): 20 TABLET, DELAYED RELEASE ORAL at 17:44

## 2020-10-20 RX ADMIN — BRIMONIDINE TARTRATE 1 DROP(S): 2 SOLUTION/ DROPS OPHTHALMIC at 17:44

## 2020-10-20 NOTE — PROGRESS NOTE ADULT - SUBJECTIVE AND OBJECTIVE BOX
House GI Progress Note    Chief Complaint:  Patient is a 89y old  Female who presents with a chief complaint of Fall (20 Oct 2020 08:02)    Interval Events / Subjective: Pt had large brown BM yesterday. No witnessed melena, hematochezia. She denies nausea, vomiting, abdominal pain. Tolerating po but eating less as she does not like the food. Pt had slight decrease in Hgb to 7.8 this am.       REVIEW OF SYSTEMS:   General: No fever, chills, +chronic fatigue  HEENT: No sore throat  CV: No chest pain, palpitations   Respiratory: No dyspnea, cough  GI: See HPI  : No hematuria, dysuria   MSK: No joint pain, swelling or stiffness   Neuro: No headache, focal weakness  Psych: No depressed mood, anxiety   Endocrine: No polyuria, polydipsia   Hematologic: No petechiae, ecchymoses   Skin: No rashes or lesions    MEDICATIONS:   MEDICATIONS  (STANDING):  amLODIPine   Tablet 10 milliGRAM(s) Oral daily  brimonidine 0.2% Ophthalmic Solution 1 Drop(s) Both EYES two times a day  influenza   Vaccine 0.5 milliLiter(s) IntraMuscular once  influenza  Vaccine (HIGH DOSE) 0.7 milliLiter(s) IntraMuscular once  lisinopril 40 milliGRAM(s) Oral daily  multivitamin/minerals/iron Oral Solution (CENTRUM) 15 milliLiter(s) Oral daily  pantoprazole    Tablet 40 milliGRAM(s) Oral two times a day  polyethylene glycol 3350 17 Gram(s) Oral daily  senna 2 Tablet(s) Oral at bedtime  sodium chloride 0.9%. 1000 milliLiter(s) (100 mL/Hr) IV Continuous <Continuous>  sucralfate 1 Gram(s) Oral four times a day  timolol 0.5% Solution 1 Drop(s) Both EYES two times a day    MEDICATIONS  (PRN):  acetaminophen   Tablet .. 650 milliGRAM(s) Oral every 6 hours PRN Temp greater or equal to 38C (100.4F), Mild Pain (1 - 3)      ALLERGIES:   Allergies  No Known Allergies    VITAL SIGNS:   Vital Signs Last 24 Hrs  T(C): 36.9 (20 Oct 2020 14:01), Max: 36.9 (20 Oct 2020 14:01)  T(F): 98.4 (20 Oct 2020 14:01), Max: 98.4 (20 Oct 2020 14:01)  HR: 68 (20 Oct 2020 14:01) (67 - 70)  BP: 103/54 (20 Oct 2020 14:01) (103/50 - 122/45)  BP(mean): --  RR: 18 (20 Oct 2020 14:01) (18 - 18)  SpO2: 98% (20 Oct 2020 14:01) (97% - 100%)  I&O's Summary    19 Oct 2020 07:01  -  20 Oct 2020 07:00  --------------------------------------------------------  IN: 280 mL / OUT: 700 mL / NET: -420 mL      PHYSICAL EXAM:   GENERAL:  Appears stated age, well-groomed, well-nourished, no distress  HEENT:  NC/AT, conjunctivae clear, sclera -anicteric  CHEST:  Full & symmetric excursion, no increased effort, breath sounds clear  HEART:  Regular rhythm, S1, S2, no murmur/rub/S3/S4, no edema  ABDOMEN:  Soft, non-tender, non-distended, normoactive bowel sounds, no masses, no hepato-splenomegaly. Brown stool on rectal exam  EXTREMITIES: No cyanosis or edema  SKIN:  No rash/erythema/ecchymoses/petechiae/wounds/abscess/warm/dry  NEURO:  Alert, oriented    LABS:  CBC Full  -  ( 20 Oct 2020 05:53 )  WBC Count : 4.64 K/uL  RBC Count : 2.74 M/uL  Hemoglobin : 7.8 g/dL  Hematocrit : 24.5 %  Platelet Count - Automated : 587 K/uL  Mean Cell Volume : 89.4 fL  Mean Cell Hemoglobin : 28.5 pg  Mean Cell Hemoglobin Concentration : 31.8 %  Auto Neutrophil # : x  Auto Lymphocyte # : x  Auto Monocyte # : x  Auto Eosinophil # : x  Auto Basophil # : x  Auto Neutrophil % : x  Auto Lymphocyte % : x  Auto Monocyte % : x  Auto Eosinophil % : x  Auto Basophil % : x    10-20    130<L>  |  97<L>  |  19  ----------------------------<  79  4.3   |  22  |  0.78    Ca    8.2<L>      20 Oct 2020 05:53  Phos  2.6     10-20  Mg     1.9     10-20      IMAGING: reviewed

## 2020-10-20 NOTE — PROGRESS NOTE ADULT - PROBLEM SELECTOR PLAN 1
Hyponatremia to 128, unclear etiology. Grossly euvolemic.   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 No biopsies taken due to risk of bowel perforation  -H Pylori negative   -tx sulcrafate qid, pantoprazole PO BID, manage pain with non-NSAIDs  -maintain type and screen, last done 10/19; transfuse if Hgb<7  -monitor daily CBC, pt remains stable, Hb downtrending  -regular diet  -appreciate further GI recs

## 2020-10-20 NOTE — PROGRESS NOTE ADULT - PROBLEM SELECTOR PLAN 3
GIB likely due to NSAID induce gastric ulcers  -egd found large duodenal ulcer 40mm diameter likely source of melena and two smaller duodenal ulcers <8mm. No biopsies taken due to risk of bowel perforation  -H Pylori negative   -tx sulcrafate qid, pantoprazole PO BID, manage pain with non-NSAIDs  -maintain type and screen, last done 10/19; transfuse if Hgb<7  -monitor daily CBC, pt remains stable, Hb downtrending  -regular diet h/o left intertrochanteric fracture s/p left femur intramedullary nail done on 12/12/2019 by Dr. Durán. 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 Associated numbness and weakness on LLE  -appreciate ortho recs - follow up outpatient  -pain mgmt with non-NSAIDs, has not needed PRN meds  -PT and OT rec rehab  -pending dispo to [facility], COVID19 neg 10/19

## 2020-10-20 NOTE — PROGRESS NOTE ADULT - PROBLEM SELECTOR PLAN 2
h/o left intertrochanteric fracture s/p left femur intramedullary nail done on 12/12/2019 by Dr. Durán. 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 Associated numbness and weakness on LLE  -appreciate ortho recs - follow up outpatient  -pain mgmt with non-NSAIDs, has not needed PRN meds  -PT and OT rec rehab  -pending dispo to [facility], COVID19 neg 10/19 unclear etiology. Improved from 128 to 130 after 1 day of fluid restriction to 1L  -10/19 urine osm 238, serum osm 274 (low), urine Na 32, urine Cr 35.7, urine K 27.1, urine Cl 36 ---- likely hypovolemic hyponatremia, given low serum osm, Nay<40, uOsm>100   -1L 0.9% NS at 100cc/hr

## 2020-10-20 NOTE — PROGRESS NOTE ADULT - PROBLEM SELECTOR PLAN 8
VTE ppx: mechanical SCD only due to GIB  Diet: regular diet with 2 Ensure Enlive supplements  -appreciate dietician recs  Dispo planning: PT and OT rec rehab

## 2020-10-20 NOTE — PROGRESS NOTE ADULT - ASSESSMENT
89 year old woman with osteoporosis, HTN and left intertrochanteric fracture s/p IMN on 12/12/19 admitted for fall, failure to thrive, and dark stools with acute blood loss anemia secondary to severe PUD in setting of chronic ibuprofen use. Pt found to have    #Acute blood loss anemia secondary to peptic ulcer disease secondary to NSAID overuse: Single large, cratered duodenal ulcer with high-risk stigmata for bleeding; however no interventions for prevention of re-bleeding performed due to perceived risk of small bowel perforation (ulcer is deeply cratered)  #History of L intertrochanteric fracture s/p IMN 12/2019 with chronic hip pain requiring NSAID overuse   #Underweight with BMI <19.0  #Hypertension    Recommendations:   #Duodenal ulcer  - pt with brown stool on rectal exam today, no evidence of overt clinical bleeding. No indication for EGD at this time  - continue Protonix 40 mg PO BID; should be continued for 30 days outpatient upon discharge  - non-NSAIDS analgesics for pain control only    Lu Dennis   Internal Medicine PGY3 89 year old woman with osteoporosis, HTN and left intertrochanteric fracture s/p IMN on 12/12/19 admitted for fall, failure to thrive, and dark stools with acute blood loss anemia secondary to severe PUD in setting of chronic ibuprofen use. Pt found to have    #Acute blood loss anemia secondary to peptic ulcer disease secondary to NSAID overuse: Single large, cratered duodenal ulcer with high-risk stigmata for bleeding; however no interventions for prevention of re-bleeding performed due to perceived risk of small bowel perforation (ulcer is deeply cratered)  #History of L intertrochanteric fracture s/p IMN 12/2019 with chronic hip pain requiring NSAID overuse   #Underweight with BMI <19.0  #Hypertension    Recommendations:   #Duodenal ulcer. H pylori serum ab negative.  - pt with brown stool on rectal exam today, no evidence of overt clinical bleeding. No indication for EGD at this time  - if pt were to develop overt bleeding with significant drop in Hb, would consider repeat EGD at that time  - continue Protonix 40 mg PO BID; should be continued for 30 days outpatient upon discharge  - non-NSAIDS analgesics for pain control only    Lu Dennis   Internal Medicine PGY3 89 year old woman with osteoporosis, HTN and left intertrochanteric fracture s/p IMN on 12/12/19 admitted for fall, failure to thrive, and dark stools with acute blood loss anemia secondary to severe PUD in setting of chronic ibuprofen use. Pt found to have    #Acute blood loss anemia secondary to peptic ulcer disease secondary to NSAID overuse: Single large, cratered duodenal ulcer with high-risk stigmata for bleeding; however no interventions for prevention of re-bleeding performed due to perceived risk of small bowel perforation (ulcer is deeply cratered)  #History of L intertrochanteric fracture s/p IMN 12/2019 with chronic hip pain requiring NSAID overuse   #Underweight with BMI <19.0  #Hypertension    Recommendations:   #Duodenal ulcer. H pylori serum ab negative.  - pt with brown stool on rectal exam today, no evidence of overt clinical bleeding. No indication for EGD at this time  - if pt were to develop overt bleeding with significant drop in Hb, would consider repeat EGD at that time  - continue Protonix 40 mg PO BID; should be continued for 8 weeks and then transition to daily  - given size of ulcer, should have repeat EGD after course of high-dose PPI to ensure healing and biopsy ulcer  - non-NSAIDS analgesics for pain control only  --Please have patient follow up at [facility] [place]. Please call [phone] to schedule an appointment.     Lu Dennis   Internal Medicine PGY3

## 2020-10-20 NOTE — PROGRESS NOTE ADULT - PROBLEM SELECTOR PLAN 4
endorses poor appetite for past 2 months with loss of taste in past weeks  -continue to encourage PO intake and hydration  -appreciate dietician recs   -multivitamin solution for nutritional deficiency

## 2020-10-20 NOTE — PROGRESS NOTE ADULT - SUBJECTIVE AND OBJECTIVE BOX
PROGRESS NOTE:   Authored by Sharron Almeida MD  Pager: [facility] [phone]; [facility] [phone]    Patient is a 89y old  Female who presents with a chief complaint of Fall (19 Oct 2020 07:06)      SUBJECTIVE / OVERNIGHT EVENTS: yesterday afternoon had 1x non-bloody/non-melenic BM. Continues to tolerate L hip pain with Tylenol 650mg. Continuing on 1L fluid restriction due to hyponatremia.     ADDITIONAL REVIEW OF SYSTEMS:  CONSTITUTIONAL: No weakness, fevers or chills  EYES/ENT: No visual changes;  No vertigo or throat pain   NECK: No pain or stiffness  RESPIRATORY: No cough, wheezing, hemoptysis; No shortness of breath  CARDIOVASCULAR: No chest pain or palpitations  GASTROINTESTINAL: No abdominal pain; nausea, vomiting;  diarrhea or constipation. No hemetemesis, melena or hematochezia.  GENITOURINARY: No dysuria, frequency or hematuria  NEUROLOGICAL: No numbness or weakness  SKIN: No itching, burning, rashes, or lesions     MEDICATIONS  (STANDING):  amLODIPine   Tablet 10 milliGRAM(s) Oral daily  brimonidine 0.2% Ophthalmic Solution 1 Drop(s) Both EYES two times a day  influenza   Vaccine 0.5 milliLiter(s) IntraMuscular once  influenza  Vaccine (HIGH DOSE) 0.7 milliLiter(s) IntraMuscular once  lisinopril 40 milliGRAM(s) Oral daily  multivitamin/minerals/iron Oral Solution (CENTRUM) 15 milliLiter(s) Oral daily  pantoprazole    Tablet 40 milliGRAM(s) Oral two times a day  polyethylene glycol 3350 17 Gram(s) Oral daily  senna 2 Tablet(s) Oral at bedtime  sucralfate 1 Gram(s) Oral four times a day  timolol 0.5% Solution 1 Drop(s) Both EYES two times a day    MEDICATIONS  (PRN):  acetaminophen   Tablet .. 650 milliGRAM(s) Oral every 6 hours PRN Temp greater or equal to 38C (100.4F), Mild Pain (1 - 3)      CAPILLARY BLOOD GLUCOSE        I&O's Summary    19 Oct 2020 07:01  -  20 Oct 2020 07:00  --------------------------------------------------------  IN: 280 mL / OUT: 700 mL / NET: -420 mL        PHYSICAL EXAM:  Vital Signs Last 24 Hrs  T(C): 36.7 (20 Oct 2020 05:24), Max: 36.8 (19 Oct 2020 15:18)  T(F): 98 (20 Oct 2020 05:24), Max: 98.3 (19 Oct 2020 15:18)  HR: 67 (20 Oct 2020 05:24) (67 - 70)  BP: 103/50 (20 Oct 2020 05:24) (103/50 - 122/45)  BP(mean): --  RR: 18 (20 Oct 2020 05:24) (18 - 18)  SpO2: 100% (20 Oct 2020 05:24) (97% - 100%)    CONSTITUTIONAL: NAD, well-developed  RESPIRATORY: Normal respiratory effort; lungs are clear to auscultation bilaterally  CARDIOVASCULAR: Regular rate and rhythm, normal S1 and S2, no murmur/rub/gallop; No lower extremity edema; Peripheral pulses are 2+ bilaterally  ABDOMEN: Nontender to palpation, normoactive bowel sounds, no rebound/guarding; No hepatosplenomegaly  MUSCLOSKELETAL: no clubbing or cyanosis of digits; no joint swelling or tenderness to palpation  PSYCH: A+O to person, place, and time; affect appropriate    LABS:                        7.8    4.64  )-----------( 587      ( 20 Oct 2020 05:53 )             24.5     10-19    128<L>  |  99  |  14  ----------------------------<  79  5.1   |  19<L>  |  0.65    Ca    8.5      19 Oct 2020 07:20  Phos  2.5     10-19  Mg     1.9     10-19                  RADIOLOGY & ADDITIONAL TESTS:  Results Reviewed:   Imaging Personally Reviewed:  Electrocardiogram Personally Reviewed:    COORDINATION OF CARE:  Care Discussed with Consultants/Other Providers [Y/N]:  Prior or Outpatient Records Reviewed [Y/N]:   PROGRESS NOTE:   Authored by Sharron Almeida MD  Pager: [facility] [phone]; [facility] [phone]    Patient is a 89y old  Female who presents with a chief complaint of Fall (19 Oct 2020 07:06)      SUBJECTIVE / OVERNIGHT EVENTS: yesterday afternoon had 1x BM, no hematochezia or melena. Continues to tolerate L hip pain with prn Tylenol 650mg. Denies any Sx of anemia (dizziness, lightheadedness, confusion) given downtrending Hgb.     ADDITIONAL REVIEW OF SYSTEMS:  CONSTITUTIONAL: No weakness, fevers or chills  EYES/ENT: No visual changes;  No vertigo or throat pain   NECK: No pain or stiffness  RESPIRATORY: No cough, wheezing, hemoptysis; No shortness of breath  CARDIOVASCULAR: No chest pain or palpitations  GASTROINTESTINAL: No abdominal pain; nausea, vomiting;  diarrhea or constipation. No hemetemesis, melena or hematochezia.  GENITOURINARY: No dysuria, frequency or hematuria  NEUROLOGICAL: No numbness or weakness  SKIN: No itching, burning, rashes, or lesions     MEDICATIONS  (STANDING):  amLODIPine   Tablet 10 milliGRAM(s) Oral daily  brimonidine 0.2% Ophthalmic Solution 1 Drop(s) Both EYES two times a day  influenza   Vaccine 0.5 milliLiter(s) IntraMuscular once  influenza  Vaccine (HIGH DOSE) 0.7 milliLiter(s) IntraMuscular once  lisinopril 40 milliGRAM(s) Oral daily  multivitamin/minerals/iron Oral Solution (CENTRUM) 15 milliLiter(s) Oral daily  pantoprazole    Tablet 40 milliGRAM(s) Oral two times a day  polyethylene glycol 3350 17 Gram(s) Oral daily  senna 2 Tablet(s) Oral at bedtime  sucralfate 1 Gram(s) Oral four times a day  timolol 0.5% Solution 1 Drop(s) Both EYES two times a day    MEDICATIONS  (PRN):  acetaminophen   Tablet .. 650 milliGRAM(s) Oral every 6 hours PRN Temp greater or equal to 38C (100.4F), Mild Pain (1 - 3)      CAPILLARY BLOOD GLUCOSE        I&O's Summary    19 Oct 2020 07:01  -  20 Oct 2020 07:00  --------------------------------------------------------  IN: 280 mL / OUT: 700 mL / NET: -420 mL        PHYSICAL EXAM:  Vital Signs Last 24 Hrs  T(C): 36.7 (20 Oct 2020 05:24), Max: 36.8 (19 Oct 2020 15:18)  T(F): 98 (20 Oct 2020 05:24), Max: 98.3 (19 Oct 2020 15:18)  HR: 67 (20 Oct 2020 05:24) (67 - 70)  BP: 103/50 (20 Oct 2020 05:24) (103/50 - 122/45)  BP(mean): --  RR: 18 (20 Oct 2020 05:24) (18 - 18)  SpO2: 100% (20 Oct 2020 05:24) (97% - 100%)    CONSTITUTIONAL: NAD, thin  RESPIRATORY: Normal respiratory effort; lungs are clear to auscultation bilaterally  CARDIOVASCULAR: Regular rate and rhythm, normal S1 and S2, no murmur/rub/gallop; No lower extremity edema; Peripheral pulses are 2+ bilaterally  ABDOMEN: Nontender to palpation, normoactive bowel sounds, no rebound/guarding; No hepatosplenomegaly  MUSCLOSKELETAL: no clubbing or cyanosis of digits; no joint swelling or tenderness to palpation  PSYCH: A+O to person, place, and time; affect appropriate  SKIN: dry, flaky     LABS:                        7.8    4.64  )-----------( 587      ( 20 Oct 2020 05:53 )             24.5     10-19    128<L>  |  99  |  14  ----------------------------<  79  5.1   |  19<L>  |  0.65    Ca    8.5      19 Oct 2020 07:20  Phos  2.5     10-19  Mg     1.9     10-19    Osmolality, Random Urine: 238 mosmo/kg Electrolytes, Urine (10.19.20 @ 19:15)   Sodium, Random Urine: 32: Reference range not established for this test mmol/L   Potassium, Random Urine: 27.1: Reference range not established for this test mmol/L   Chloride, Random Urine: 36: Reference range not established for this test mmol/L     Creatinine, Random Urine: 35.70: * * RANDOM URINARY CREATININE * *    Osmolality, Serum: 274 mosmo/kg (10.20.20 @ 05:50)         RADIOLOGY & ADDITIONAL TESTS:  Results Reviewed:   Imaging Personally Reviewed:  Electrocardiogram Personally Reviewed:    COORDINATION OF CARE:  Care Discussed with Consultants/Other Providers [Y/N]: GI  Prior or Outpatient Records Reviewed [Y/N]:

## 2020-10-21 ENCOUNTER — TRANSCRIPTION ENCOUNTER (OUTPATIENT)
Age: 85
End: 2020-10-21

## 2020-10-21 VITALS
SYSTOLIC BLOOD PRESSURE: 117 MMHG | RESPIRATION RATE: 19 BRPM | DIASTOLIC BLOOD PRESSURE: 57 MMHG | OXYGEN SATURATION: 98 % | TEMPERATURE: 99 F | HEART RATE: 73 BPM

## 2020-10-21 LAB
ANION GAP SERPL CALC-SCNC: 11 MMO/L — SIGNIFICANT CHANGE UP (ref 7–14)
BUN SERPL-MCNC: 16 MG/DL — SIGNIFICANT CHANGE UP (ref 7–23)
CALCIUM SERPL-MCNC: 8.4 MG/DL — SIGNIFICANT CHANGE UP (ref 8.4–10.5)
CHLORIDE SERPL-SCNC: 100 MMOL/L — SIGNIFICANT CHANGE UP (ref 98–107)
CO2 SERPL-SCNC: 23 MMOL/L — SIGNIFICANT CHANGE UP (ref 22–31)
CREAT SERPL-MCNC: 0.69 MG/DL — SIGNIFICANT CHANGE UP (ref 0.5–1.3)
GLUCOSE SERPL-MCNC: 105 MG/DL — HIGH (ref 70–99)
HCT VFR BLD CALC: 28.4 % — LOW (ref 34.5–45)
HGB BLD-MCNC: 8.8 G/DL — LOW (ref 11.5–15.5)
MAGNESIUM SERPL-MCNC: 1.9 MG/DL — SIGNIFICANT CHANGE UP (ref 1.6–2.6)
MCHC RBC-ENTMCNC: 28.7 PG — SIGNIFICANT CHANGE UP (ref 27–34)
MCHC RBC-ENTMCNC: 31 % — LOW (ref 32–36)
MCV RBC AUTO: 92.5 FL — SIGNIFICANT CHANGE UP (ref 80–100)
NRBC # FLD: 0 K/UL — SIGNIFICANT CHANGE UP (ref 0–0)
PHOSPHATE SERPL-MCNC: 4.3 MG/DL — SIGNIFICANT CHANGE UP (ref 2.5–4.5)
PLATELET # BLD AUTO: 592 K/UL — HIGH (ref 150–400)
PMV BLD: 9 FL — SIGNIFICANT CHANGE UP (ref 7–13)
POTASSIUM SERPL-MCNC: 3.9 MMOL/L — SIGNIFICANT CHANGE UP (ref 3.5–5.3)
POTASSIUM SERPL-SCNC: 3.9 MMOL/L — SIGNIFICANT CHANGE UP (ref 3.5–5.3)
RBC # BLD: 3.07 M/UL — LOW (ref 3.8–5.2)
RBC # FLD: 13.1 % — SIGNIFICANT CHANGE UP (ref 10.3–14.5)
SODIUM SERPL-SCNC: 134 MMOL/L — LOW (ref 135–145)
WBC # BLD: 4.88 K/UL — SIGNIFICANT CHANGE UP (ref 3.8–10.5)
WBC # FLD AUTO: 4.88 K/UL — SIGNIFICANT CHANGE UP (ref 3.8–10.5)

## 2020-10-21 PROCEDURE — 99239 HOSP IP/OBS DSCHRG MGMT >30: CPT | Mod: GC

## 2020-10-21 PROCEDURE — 99232 SBSQ HOSP IP/OBS MODERATE 35: CPT | Mod: GC

## 2020-10-21 RX ORDER — ACETAMINOPHEN 500 MG
650 TABLET ORAL EVERY 6 HOURS
Refills: 0 | Status: DISCONTINUED | OUTPATIENT
Start: 2020-10-21 | End: 2020-10-21

## 2020-10-21 RX ORDER — ACETAMINOPHEN 500 MG
2 TABLET ORAL
Qty: 0 | Refills: 0 | DISCHARGE
Start: 2020-10-21

## 2020-10-21 RX ORDER — PANTOPRAZOLE SODIUM 20 MG/1
1 TABLET, DELAYED RELEASE ORAL
Qty: 60 | Refills: 0
Start: 2020-10-21 | End: 2020-11-19

## 2020-10-21 RX ORDER — SUCRALFATE 1 G
1 TABLET ORAL
Qty: 60 | Refills: 0
Start: 2020-10-21 | End: 2020-11-19

## 2020-10-21 RX ADMIN — Medication 85 MILLIMOLE(S): at 03:14

## 2020-10-21 RX ADMIN — AMLODIPINE BESYLATE 10 MILLIGRAM(S): 2.5 TABLET ORAL at 05:53

## 2020-10-21 RX ADMIN — Medication 650 MILLIGRAM(S): at 10:00

## 2020-10-21 RX ADMIN — POLYETHYLENE GLYCOL 3350 17 GRAM(S): 17 POWDER, FOR SOLUTION ORAL at 11:38

## 2020-10-21 RX ADMIN — PANTOPRAZOLE SODIUM 40 MILLIGRAM(S): 20 TABLET, DELAYED RELEASE ORAL at 05:53

## 2020-10-21 RX ADMIN — Medication 1 GRAM(S): at 05:52

## 2020-10-21 RX ADMIN — Medication 15 MILLILITER(S): at 11:38

## 2020-10-21 RX ADMIN — Medication 1 GRAM(S): at 11:37

## 2020-10-21 RX ADMIN — Medication 1 DROP(S): at 05:53

## 2020-10-21 RX ADMIN — BRIMONIDINE TARTRATE 1 DROP(S): 2 SOLUTION/ DROPS OPHTHALMIC at 05:53

## 2020-10-21 RX ADMIN — Medication 650 MILLIGRAM(S): at 09:30

## 2020-10-21 NOTE — DISCHARGE NOTE NURSING/CASE MANAGEMENT/SOCIAL WORK - NSDCCRNAME_GEN_ALL_CORE_FT
[facility] [place],  [facility] [place] [address] P:[phone]

## 2020-10-21 NOTE — DISCHARGE NOTE NURSING/CASE MANAGEMENT/SOCIAL WORK - NSDPFAC_GEN_ALL_CORE
[facility] [place],  [facility] [address] P:[phone]

## 2020-10-21 NOTE — PROGRESS NOTE ADULT - SUBJECTIVE AND OBJECTIVE BOX
House GI Progress Note    Chief Complaint:  Patient is a 89y old  Female who presents with a chief complaint of Fall (21 Oct 2020 07:40)    Interval Events / Subjective: No events overnight. Pt with last BM yesterday which was brown, no melena or hematochezia. Pt denies abdominal pain, nausea, vomiting.     REVIEW OF SYSTEMS:   General: No fever, chills. +fatigue  CV: No chest pain, palpitations   Respiratory: No dyspnea, cough   GI: See HPI  : No hematuria, dysuria  MSK: +L hip pain  Neuro: No headache, focal weakness   Psych: No depressed mood, anxiety    Endocrine: No polyuria, polydipsia   Hematologic: No petechiae, ecchymoses    Skin: No rashes or lesions    MEDICATIONS:   MEDICATIONS  (STANDING):  amLODIPine   Tablet 10 milliGRAM(s) Oral daily  brimonidine 0.2% Ophthalmic Solution 1 Drop(s) Both EYES two times a day  influenza   Vaccine 0.5 milliLiter(s) IntraMuscular once  influenza  Vaccine (HIGH DOSE) 0.7 milliLiter(s) IntraMuscular once  lisinopril 40 milliGRAM(s) Oral daily  multivitamin/minerals/iron Oral Solution (CENTRUM) 15 milliLiter(s) Oral daily  pantoprazole    Tablet 40 milliGRAM(s) Oral two times a day  polyethylene glycol 3350 17 Gram(s) Oral daily  senna 2 Tablet(s) Oral at bedtime  sodium chloride 0.9%. 1000 milliLiter(s) (100 mL/Hr) IV Continuous <Continuous>  sucralfate 1 Gram(s) Oral four times a day  timolol 0.5% Solution 1 Drop(s) Both EYES two times a day    MEDICATIONS  (PRN):  acetaminophen   Tablet .. 650 milliGRAM(s) Oral every 6 hours PRN Temp greater or equal to 38C (100.4F), Mild Pain (1 - 3), Moderate Pain (4 - 6), Severe Pain (7 - 10)    ALLERGIES:   No Known Allergies    VITAL SIGNS:   Vital Signs Last 24 Hrs  T(C): 36.9 (21 Oct 2020 05:42), Max: 37.1 (20 Oct 2020 21:41)  T(F): 98.4 (21 Oct 2020 05:42), Max: 98.8 (20 Oct 2020 21:41)  HR: 70 (21 Oct 2020 09:32) (60 - 70)  BP: 109/50 (21 Oct 2020 09:32) (103/54 - 122/50)  BP(mean): --  RR: 18 (21 Oct 2020 05:42) (18 - 18)  SpO2: 97% (21 Oct 2020 05:42) (97% - 100%)  I&O's Summary    20 Oct 2020 07:01  -  21 Oct 2020 07:00  --------------------------------------------------------  IN: 0 mL / OUT: 1100 mL / NET: -1100 mL      PHYSICAL EXAM:   GENERAL:  Appears stated age, well-groomed, well-nourished, no distress  HEENT:  NC/AT,  conjunctivae clear, sclera -anicteric  CHEST:  Full & symmetric excursion, no increased effort, breath sounds clear  HEART:  Regular rhythm, S1, S2, no murmur/rub/S3/S4, no edema  ABDOMEN:  Soft, non-tender, non-distended, normoactive bowel sounds, no masses, no hepato-splenomegaly  EXTREMITIES: No cyanosis or edema  SKIN:  No rash/erythema/ecchymoses/petechiae/wounds/abscess/warm/dry  NEURO:  Alert, oriented    LABS:  CBC Full  -  ( 21 Oct 2020 09:57 )  WBC Count : 4.88 K/uL  RBC Count : 3.07 M/uL  Hemoglobin : 8.8 g/dL  Hematocrit : 28.4 %  Platelet Count - Automated : 592 K/uL  Mean Cell Volume : 92.5 fL  Mean Cell Hemoglobin : 28.7 pg  Mean Cell Hemoglobin Concentration : 31.0 %  Auto Neutrophil # : x  Auto Lymphocyte # : x  Auto Monocyte # : x  Auto Eosinophil # : x  Auto Basophil # : x  Auto Neutrophil % : x  Auto Lymphocyte % : x  Auto Monocyte % : x  Auto Eosinophil % : x  Auto Basophil % : x    10-21    134<L>  |  100  |  16  ----------------------------<  105<H>  3.9   |  23  |  0.69    Ca    8.4      21 Oct 2020 10:00  Phos  4.3     10-21  Mg     1.9     10-21                IMAGING:

## 2020-10-21 NOTE — PROGRESS NOTE ADULT - ASSESSMENT
89 year old woman with osteoporosis, HTN and left intertrochanteric fracture s/p IMN on 12/12/19 admitted for fall, failure to thrive, and dark stools with acute blood loss anemia secondary to severe PUD in setting of chronic ibuprofen use. Pt found to have    #Acute blood loss anemia secondary to peptic ulcer disease secondary to NSAID overuse: Single large, cratered duodenal ulcer with high-risk stigmata for bleeding; however no interventions for prevention of re-bleeding performed due to perceived risk of small bowel perforation (ulcer is deeply cratered)  #History of L intertrochanteric fracture s/p IMN 12/2019 with chronic hip pain requiring NSAID overuse   #Underweight with BMI <19.0  #Hypertension    Recommendations:   #Duodenal ulcer. H pylori serum ab negative.  - pt with Hgb 8.8 today, yesterday's value likely inaccurate. Low suspicion for recurrent bleeding  - continue Protonix 40 mg PO BID; should be continued for 8 weeks and then transition to daily  - given size of ulcer, should have repeat EGD after course of high-dose PPI to ensure healing and biopsy ulcer  - non-NSAIDS analgesics for pain control only  --Please have patient follow up at [facility] [place]. Please call [phone] to schedule an appointment.     Lu Dennis   Internal Medicine PGY3

## 2020-10-21 NOTE — PROGRESS NOTE ADULT - PROBLEM SELECTOR PLAN 1
GIB likely due to NSAID induce gastric ulcers  -egd found large duodenal ulcer 40mm diameter likely source of melena and two smaller duodenal ulcers <8mm. No biopsies taken due to risk of bowel perforation  -H Pylori negative   -tx sulcrafate qid, pantoprazole PO BID, manage pain with non-NSAIDs  -maintain type and screen, last done 10/19; transfuse if Hgb<7  -monitor daily CBC, pt remains stable, Hb downtrending  -regular diet  -appreciate further GI recs GIB likely due to NSAID induce gastric ulcers  -egd found large duodenal ulcer 40mm diameter likely source of melena and two smaller duodenal ulcers <8mm. No biopsies taken due to risk of bowel perforation  -H Pylori negative   -c/w outpatient meds sulcrafate BID, pantoprazole PO BID, non-NSAIDs  -maintain type and screen, last done 10/19; transfuse if Hgb<7  -monitor daily CBC, pt remains stable, Hb at baseline  -regular diet  -GI provided clearance for d/c to [facility], planned for today  -f/u outpt GI at [facility], repeat upper endoscopy in 6 wks

## 2020-10-21 NOTE — PROGRESS NOTE ADULT - SUBJECTIVE AND OBJECTIVE BOX
PROGRESS NOTE:   Authored by Sharron Almeida MD  Pager: [facility] [phone]; [facility] [phone]    Patient is a 89y old  Female who presents with a chief complaint of Fall (20 Oct 2020 14:12)      SUBJECTIVE / OVERNIGHT EVENTS:    ADDITIONAL REVIEW OF SYSTEMS:  CONSTITUTIONAL: No weakness, fevers or chills  EYES/ENT: No visual changes;  No vertigo or throat pain   NECK: No pain or stiffness  RESPIRATORY: No cough, wheezing, hemoptysis; No shortness of breath  CARDIOVASCULAR: No chest pain or palpitations  GASTROINTESTINAL: No abdominal pain; nausea, vomiting;  diarrhea or constipation. No hemetemesis, melena or hematochezia.  GENITOURINARY: No dysuria, frequency or hematuria  NEUROLOGICAL: No numbness or weakness  SKIN: No itching, burning, rashes, or lesions     MEDICATIONS  (STANDING):  amLODIPine   Tablet 10 milliGRAM(s) Oral daily  brimonidine 0.2% Ophthalmic Solution 1 Drop(s) Both EYES two times a day  influenza   Vaccine 0.5 milliLiter(s) IntraMuscular once  influenza  Vaccine (HIGH DOSE) 0.7 milliLiter(s) IntraMuscular once  lisinopril 40 milliGRAM(s) Oral daily  multivitamin/minerals/iron Oral Solution (CENTRUM) 15 milliLiter(s) Oral daily  pantoprazole    Tablet 40 milliGRAM(s) Oral two times a day  polyethylene glycol 3350 17 Gram(s) Oral daily  senna 2 Tablet(s) Oral at bedtime  sodium chloride 0.9%. 1000 milliLiter(s) (100 mL/Hr) IV Continuous <Continuous>  sucralfate 1 Gram(s) Oral four times a day  timolol 0.5% Solution 1 Drop(s) Both EYES two times a day    MEDICATIONS  (PRN):  acetaminophen   Tablet .. 650 milliGRAM(s) Oral every 6 hours PRN Temp greater or equal to 38C (100.4F), Mild Pain (1 - 3)      CAPILLARY BLOOD GLUCOSE        I&O's Summary    20 Oct 2020 07:01  -  21 Oct 2020 07:00  --------------------------------------------------------  IN: 0 mL / OUT: 500 mL / NET: -500 mL        PHYSICAL EXAM:  Vital Signs Last 24 Hrs  T(C): 36.9 (21 Oct 2020 05:42), Max: 37.1 (20 Oct 2020 21:41)  T(F): 98.4 (21 Oct 2020 05:42), Max: 98.8 (20 Oct 2020 21:41)  HR: 60 (21 Oct 2020 05:42) (60 - 68)  BP: 122/50 (21 Oct 2020 05:42) (103/54 - 122/50)  BP(mean): --  RR: 18 (21 Oct 2020 05:42) (18 - 18)  SpO2: 97% (21 Oct 2020 05:42) (97% - 100%)    CONSTITUTIONAL: NAD, well-developed  RESPIRATORY: Normal respiratory effort; lungs are clear to auscultation bilaterally  CARDIOVASCULAR: Regular rate and rhythm, normal S1 and S2, no murmur/rub/gallop; No lower extremity edema; Peripheral pulses are 2+ bilaterally  ABDOMEN: Nontender to palpation, normoactive bowel sounds, no rebound/guarding; No hepatosplenomegaly  MUSCLOSKELETAL: no clubbing or cyanosis of digits; no joint swelling or tenderness to palpation  PSYCH: A+O to person, place, and time; affect appropriate    LABS:                        7.8    4.64  )-----------( 587      ( 20 Oct 2020 05:53 )             24.5     10-20    131<L>  |  100  |  21  ----------------------------<  122<H>  4.1   |  23  |  0.76    Ca    7.9<L>      20 Oct 2020 22:40  Phos  1.8     10-20  Mg     1.9     10-20                  RADIOLOGY & ADDITIONAL TESTS:  Results Reviewed:   Imaging Personally Reviewed:  Electrocardiogram Personally Reviewed:    COORDINATION OF CARE:  Care Discussed with Consultants/Other Providers [Y/N]:  Prior or Outpatient Records Reviewed [Y/N]:   PROGRESS NOTE:   Authored by Sharron Almeida MD  Pager: [facility] [phone]; [facility] [phone]    Patient is a 89y old  Female who presents with a chief complaint of Fall (20 Oct 2020 14:12)      SUBJECTIVE / OVERNIGHT EVENTS: overnight no acute events, Pt remained stable with no active bleeding, mentating well as per baseline. L hip pain persists but well controlled with PRN Tylenol. Appetite improving. Discussed plan for discharge to subacute rehab today and follow up care.    ADDITIONAL REVIEW OF SYSTEMS:  CONSTITUTIONAL: No weakness, fevers or chills  EYES/ENT: No visual changes;  No vertigo or throat pain   NECK: No pain or stiffness  RESPIRATORY: No cough, wheezing, hemoptysis; No shortness of breath  CARDIOVASCULAR: No chest pain or palpitations  GASTROINTESTINAL: No abdominal pain; nausea, vomiting;  diarrhea or constipation. No hemetemesis, melena or hematochezia.  GENITOURINARY: No dysuria, frequency or hematuria  NEUROLOGICAL: No numbness or weakness  SKIN: No itching, burning, rashes, or lesions     MEDICATIONS  (STANDING):  amLODIPine   Tablet 10 milliGRAM(s) Oral daily  brimonidine 0.2% Ophthalmic Solution 1 Drop(s) Both EYES two times a day  influenza   Vaccine 0.5 milliLiter(s) IntraMuscular once  influenza  Vaccine (HIGH DOSE) 0.7 milliLiter(s) IntraMuscular once  lisinopril 40 milliGRAM(s) Oral daily  multivitamin/minerals/iron Oral Solution (CENTRUM) 15 milliLiter(s) Oral daily  pantoprazole    Tablet 40 milliGRAM(s) Oral two times a day  polyethylene glycol 3350 17 Gram(s) Oral daily  senna 2 Tablet(s) Oral at bedtime  sodium chloride 0.9%. 1000 milliLiter(s) (100 mL/Hr) IV Continuous <Continuous>  sucralfate 1 Gram(s) Oral four times a day  timolol 0.5% Solution 1 Drop(s) Both EYES two times a day    MEDICATIONS  (PRN):  acetaminophen   Tablet .. 650 milliGRAM(s) Oral every 6 hours PRN Temp greater or equal to 38C (100.4F), Mild Pain (1 - 3)      CAPILLARY BLOOD GLUCOSE        I&O's Summary    20 Oct 2020 07:01  -  21 Oct 2020 07:00  --------------------------------------------------------  IN: 0 mL / OUT: 500 mL / NET: -500 mL        PHYSICAL EXAM:  Vital Signs Last 24 Hrs  T(C): 36.9 (21 Oct 2020 05:42), Max: 37.1 (20 Oct 2020 21:41)  T(F): 98.4 (21 Oct 2020 05:42), Max: 98.8 (20 Oct 2020 21:41)  HR: 60 (21 Oct 2020 05:42) (60 - 68)  BP: 122/50 (21 Oct 2020 05:42) (103/54 - 122/50)  BP(mean): --  RR: 18 (21 Oct 2020 05:42) (18 - 18)  SpO2: 97% (21 Oct 2020 05:42) (97% - 100%)    CONSTITUTIONAL: NAD, thin  RESPIRATORY: Normal respiratory effort; lungs are clear to auscultation bilaterally  CARDIOVASCULAR: Regular rate and rhythm, normal S1 and S2, no murmur/rub/gallop; No lower extremity edema; Peripheral pulses are 2+ bilaterally  ABDOMEN: Nontender to palpation, normoactive bowel sounds, no rebound/guarding; No hepatosplenomegaly  MUSCLOSKELETAL: no clubbing or cyanosis of digits; no joint swelling or tenderness to palpation  PSYCH: A+O to person, place, and time; affect appropriate    LABS:                                   8.8    4.88  )-----------( 592      ( 21 Oct 2020 09:57 )             28.4     10-21    134<L>  |  100  |  16  ----------------------------<  105<H>  3.9   |  23  |  0.69    Ca    8.4      21 Oct 2020 10:00  Phos  4.3     10-21  Mg     1.9     10-21                            RADIOLOGY & ADDITIONAL TESTS:  Results Reviewed:   Imaging Personally Reviewed:  Electrocardiogram Personally Reviewed:    COORDINATION OF CARE:  Care Discussed with Consultants/Other Providers [Y/N]: Gastro  Prior or Outpatient Records Reviewed [Y/N]:

## 2020-10-21 NOTE — PROGRESS NOTE ADULT - ASSESSMENT
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

## 2020-10-21 NOTE — PROGRESS NOTE ADULT - NUTRITIONAL ASSESSMENT
This patient has been assessed with a concern for Malnutrition and has been determined to have a diagnosis/diagnoses of Severe protein-calorie malnutrition and Underweight/BMI < 19.    This patient is being managed with:   Diet Full Liquid-  Supplement Feeding Modality:  Oral  Ensure Enlive Cans or Servings Per Day:  2       Frequency:  Daily  Entered: Oct 15 2020  9:13AM    
This patient has been assessed with a concern for Malnutrition and has been determined to have a diagnosis/diagnoses of Severe protein-calorie malnutrition and Underweight/BMI < 19.    This patient is being managed with:   Diet Clear Liquid-  Entered: Oct 13 2020  3:10PM    
This patient has been assessed with a concern for Malnutrition and has been determined to have a diagnosis/diagnoses of Severe protein-calorie malnutrition and Underweight/BMI < 19.    This patient is being managed with:   Diet Full Liquid-  Supplement Feeding Modality:  Oral  Ensure Enlive Cans or Servings Per Day:  2       Frequency:  Daily  Entered: Oct 15 2020  9:13AM    
This patient has been assessed with a concern for Malnutrition and has been determined to have a diagnosis/diagnoses of Severe protein-calorie malnutrition and Underweight/BMI < 19.    This patient is being managed with:   Diet Regular-  Supplement Feeding Modality:  Oral  Ensure Enlive Cans or Servings Per Day:  2       Frequency:  Daily  Entered: Oct 16 2020  6:21AM    
This patient has been assessed with a concern for Malnutrition and has been determined to have a diagnosis/diagnoses of Severe protein-calorie malnutrition and Underweight/BMI < 19.    This patient is being managed with:   Diet Regular-  Supplement Feeding Modality:  Oral  Ensure Enlive Cans or Servings Per Day:  2       Frequency:  Daily  Entered: Oct 16 2020  6:21AM    
This patient has been assessed with a concern for Malnutrition and has been determined to have a diagnosis/diagnoses of Severe protein-calorie malnutrition and Underweight/BMI < 19.    This patient is being managed with:   Diet Regular-  Supplement Feeding Modality:  Oral  Ensure Enlive Cans or Servings Per Day:  2       Frequency:  Daily  Entered: Oct 20 2020 10:36AM    
This patient has been assessed with a concern for Malnutrition and has been determined to have a diagnosis/diagnoses of Severe protein-calorie malnutrition and Underweight/BMI < 19.    This patient is being managed with:   Diet Regular-  Supplement Feeding Modality:  Oral  Ensure Enlive Cans or Servings Per Day:  2       Frequency:  Daily  Entered: Oct 20 2020 10:36AM    
This patient has been assessed with a concern for Malnutrition and has been determined to have a diagnosis/diagnoses of Severe protein-calorie malnutrition and Underweight/BMI < 19.    This patient is being managed with:   Diet Clear Liquid-  Entered: Oct 13 2020  3:10PM    
This patient has been assessed with a concern for Malnutrition and has been determined to have a diagnosis/diagnoses of Severe protein-calorie malnutrition and Underweight/BMI < 19.    This patient is being managed with:   Diet Clear Liquid-  Entered: Oct 13 2020  3:10PM    
This patient has been assessed with a concern for Malnutrition and has been determined to have a diagnosis/diagnoses of Severe protein-calorie malnutrition and Underweight/BMI < 19.    This patient is being managed with:   Diet Regular-  Supplement Feeding Modality:  Oral  Ensure Enlive Cans or Servings Per Day:  2       Frequency:  Daily  Entered: Oct 16 2020  6:21AM    
This patient has been assessed with a concern for Malnutrition and has been determined to have a diagnosis/diagnoses of Severe protein-calorie malnutrition and Underweight/BMI < 19.    This patient is being managed with:   Diet Regular-  Supplement Feeding Modality:  Oral  Ensure Enlive Cans or Servings Per Day:  2       Frequency:  Daily  Entered: Oct 16 2020  6:21AM    
This patient has been assessed with a concern for Malnutrition and has been determined to have a diagnosis/diagnoses of Severe protein-calorie malnutrition and Underweight/BMI < 19.    This patient is being managed with:   Diet Regular-  1000mL Fluid Restriction ([id number])  Supplement Feeding Modality:  Oral  Ensure Enlive Cans or Servings Per Day:  2       Frequency:  Daily  Entered: Oct 19 2020  3:36PM    
This patient has been assessed with a concern for Malnutrition and has been determined to have a diagnosis/diagnoses of Severe protein-calorie malnutrition and Underweight/BMI < 19.    This patient is being managed with:   Diet Regular-  Supplement Feeding Modality:  Oral  Ensure Enlive Cans or Servings Per Day:  2       Frequency:  Daily  Entered: Oct 16 2020  6:21AM    
This patient has been assessed with a concern for Malnutrition and has been determined to have a diagnosis/diagnoses of Severe protein-calorie malnutrition and Underweight/BMI < 19.    This patient is being managed with:   Diet Regular-  Supplement Feeding Modality:  Oral  Ensure Enlive Cans or Servings Per Day:  2       Frequency:  Daily  Entered: Oct 20 2020 10:36AM

## 2020-10-21 NOTE — DISCHARGE NOTE NURSING/CASE MANAGEMENT/SOCIAL WORK - PATIENT PORTAL LINK FT
You can access the FollowMyHealth Patient Portal offered by [facility] by registering at the following website: [email or web address]. By joining [facility]’s FollowMyHealth portal, you will also be able to view your health information using other applications (apps) compatible with our system.

## 2020-10-21 NOTE — PROGRESS NOTE ADULT - PROBLEM SELECTOR PLAN 2
unclear etiology. Improved from 128 to 130 after 1 day of fluid restriction to 1L  -10/19 urine osm 238, serum osm 274 (low), urine Na 32, urine Cr 35.7, urine K 27.1, urine Cl 36 ---- likely hypovolemic hyponatremia, given low serum osm, Nay<40, uOsm>100   -1L 0.9% NS at 100cc/hr unclear etiology. Improved s/p 1L NS bolus on discharge date  -10/19 urine osm 238, serum osm 274 (low), urine Na 32, urine Cr 35.7, urine K 27.1, urine Cl 36 ---- likely hypovolemic hyponatremia, given low serum osm, Nay<40, uOsm>100 Improved s/p 1L NS bolus on discharge date. Due to hypovolemia.  -10/19 urine osm 238, serum osm 274 (low), urine Na 32, urine Cr 35.7, urine K 27.1, urine Cl 36 ---- likely hypovolemic hyponatremia, given low serum osm, Nay<40, uOsm>100

## 2020-10-21 NOTE — PROGRESS NOTE ADULT - PROBLEM SELECTOR PLAN 3
h/o left intertrochanteric fracture s/p left femur intramedullary nail done on 12/12/2019 by Dr. Durán. 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 Associated numbness and weakness on LLE  -appreciate ortho recs - follow up outpatient  -pain mgmt with non-NSAIDs, has not needed PRN meds  -PT and OT rec rehab  -pending dispo to [facility], COVID19 neg 10/19

## 2020-10-21 NOTE — PROGRESS NOTE ADULT - ATTENDING COMMENTS
GI reconsulted for slowly downtrending Hgb, today 7.8, same as 10/13 value.   No further overt bleeding.   Low suspicion for recurrent bleeding, but if Hgb continues to downtrend (will follow up AM CBC), may warrant repeat EGD to re-evaluate duodenal ulcer.  Continue high dose PPI and outpatient GI follow up.
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 EGD revealed large cratered duodenal ulcer.  Hbg has been stable. Transitioned to oral PPI and observing for rebleed. Pain control with Tylenol and low dose oxy if needed.
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 Also UGIB in setting of NSAID use; found to have gastric ulcers on endoscopy. Hgb improving after pRBC transfusion. Will c/w IV PPI for 72hrs per GI recs. PT eval pending.
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 EGD revealed large cratered duodenal ulcer.  Hbg has been stable ~9. continue with PPI and observing for rebleed. Pain control with Tylenol and low dose oxy if needed.
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 Also UGIB in setting of NSAID use; found to have gastric ulcers on endoscopy. Hgb improving after pRBC transfusion. Will c/w IV PPI for 72hrs per GI recs. PT recommends [facility].
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 Also UGIB in setting of NSAID use; found to have gastric ulcers on endoscopy. Hgb improving after pRBC transfusion. Will c/w IV PPI for 72hrs per GI recs. PT recommends [facility].
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 Also UGIB in setting of NSAID use; found to have gastric ulcers on endoscopy. Hgb stabilized, no e/o further hemorrhage. Course c/b moderate hyponatremia. Urine electrolytes consistent with hypovolemic hyponatremia; improved with IVF. Stable for dc with outpatient GI f/u.      I spent 35 minutes counseling this patient and coordinating their discharge.
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 Also UGIB in setting of NSAID use; found to have gastric ulcers on endoscopy. Hgb trending downward slowly but no e/o hemorrhage; continuing to monitor CBC. Course c/b moderate hyponatremia. Urine electrolytes consistent with hypovolemic hyponatremia, will give IVNS x1L and repeat BMP.
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 Also UGIB in setting of NSAID use; found to have gastric ulcers on endoscopy. Hgb stabilized. Course c/b moderate hyponatremia. Sending serum osm and urine lytes.
Patient seen and examined.   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  # Gi bleed. C/w monitoring of h/h- Hgb down trending slowly- cbc and t/s in AM  # Na 128- monitor serum and urine lytes and osms in AM- observe of IVF .

## 2020-10-21 NOTE — PROGRESS NOTE ADULT - PROBLEM SELECTOR PLAN 8
VTE ppx: mechanical SCD only due to GIB  Diet: regular diet with 2 Ensure Enlive supplements  -appreciate dietician recs  Dispo planning: PT and OT rec rehab VTE ppx: mechanical SCD only due to GIB  Diet: regular diet with 2 Ensure Enlive supplements  -appreciate dietician recs  Dispo planning: subacute rehab (PT and OT eval)

## 2020-10-21 NOTE — DISCHARGE NOTE NURSING/CASE MANAGEMENT/SOCIAL WORK - NSDCFUADDAPPT_GEN_ALL_CORE_FT
You will need a repeat upper endoscopy 6 weeks after your discharge and your medications will be adjusted as appropriate after subacute rehab. Follow up at the [facility].

## 2020-10-21 NOTE — DISCHARGE NOTE NURSING/CASE MANAGEMENT/SOCIAL WORK - NSDCPNINST_GEN_ALL_CORE
Patient is alert and orientedx4 able to make needs known. patient admitted for anemia, s/p PRBS, H/H stable this morning, no blood in stool noted. Patient is being transferred to rehab. Patient is stable at the time of discharge. No acute distress noted. Patient is alert and orientedx4 able to make needs known. patient admitted for anemia, s/p PRBCs, H/H stable this morning, no blood in stool noted. Patient is being transferred to rehab. Patient is stable at the time of discharge. No acute distress noted.

## 2020-10-23 LAB
SARS-COV-2 IGG SERPL QL IA: NEGATIVE — SIGNIFICANT CHANGE UP
SARS-COV-2 IGM SERPL IA-ACNC: <3.8 AU/ML — SIGNIFICANT CHANGE UP

## 2020-12-09 NOTE — PROGRESS NOTE ADULT - I WAS PHYSICALLY PRESENT FOR THE KEY PORTIONS OF THE EVALUATION AND MANAGEMENT (E/M) SERVICE PROVIDED.  I AGREE WITH THE ABOVE HISTORY, PHYSICAL, AND PLAN WHICH I HAVE REVIEWED AND EDITED WHERE APPROPRIATE
Statement Selected
Normal
Normal
Statement Selected

## 2022-01-31 NOTE — ED ADULT NURSE NOTE - TEMPLATE LIST FOR HEAD TO TOE ASSESSMENT
2nd attempt to contact patient to schedule injection, left message.  
3rd attempt to contact patient, patient in car and will call back at [phone] later today when able to schedule  
Attempted to contact patient to schedule injection, left message.  
Pt scheduled for LESI  Date:2/24/22  Time:1130sm  Dr. Anguiano    Instructed pt to have a  for procedure.  Dosen't need COVID test, done under local  
General

## 2024-01-02 NOTE — DISCHARGE NOTE PROVIDER - NSDCQMCOGNITION_NEU_ALL_CORE
No difficulties
What Type Of Note Output Would You Prefer (Optional)?: Standard Output
How Severe Is Your Rash?: moderate
Is This A New Presentation, Or A Follow-Up?: Rash

## 2025-05-12 NOTE — ED ADULT NURSE NOTE - NSSEPSISSUSPECTED_ED_A_ED
Ambulatory Care 
No